# Patient Record
Sex: FEMALE | Race: WHITE | NOT HISPANIC OR LATINO | Employment: UNEMPLOYED | URBAN - METROPOLITAN AREA
[De-identification: names, ages, dates, MRNs, and addresses within clinical notes are randomized per-mention and may not be internally consistent; named-entity substitution may affect disease eponyms.]

---
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
## 2021-03-05 ENCOUNTER — HOSPITAL ENCOUNTER (EMERGENCY)
Facility: HOSPITAL | Age: 21
Discharge: HOME | End: 2021-03-05
Admitting: EMERGENCY MEDICINE

## 2021-03-05 ENCOUNTER — APPOINTMENT (OUTPATIENT)
Dept: GENERAL RADIOLOGY | Facility: HOSPITAL | Age: 21
End: 2021-03-05

## 2021-03-05 VITALS
HEART RATE: 82 BPM | BODY MASS INDEX: 51.73 KG/M2 | SYSTOLIC BLOOD PRESSURE: 130 MMHG | HEIGHT: 62 IN | WEIGHT: 281.09 LBS | RESPIRATION RATE: 14 BRPM | OXYGEN SATURATION: 99 % | TEMPERATURE: 97.2 F | DIASTOLIC BLOOD PRESSURE: 81 MMHG

## 2021-03-05 DIAGNOSIS — S93.402A SPRAIN AND STRAIN OF LEFT ANKLE: ICD-10-CM

## 2021-03-05 DIAGNOSIS — S93.402A SPRAIN OF LEFT ANKLE, UNSPECIFIED LIGAMENT, INITIAL ENCOUNTER: ICD-10-CM

## 2021-03-05 DIAGNOSIS — S80.02XA CONTUSION OF LEFT KNEE, INITIAL ENCOUNTER: ICD-10-CM
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
## 2021-03-05 DIAGNOSIS — S96.912A SPRAIN AND STRAIN OF LEFT ANKLE: ICD-10-CM

## 2021-03-05 DIAGNOSIS — M79.672 LEFT FOOT PAIN: ICD-10-CM

## 2021-03-05 PROCEDURE — 73610 X-RAY EXAM OF ANKLE: CPT

## 2021-03-05 PROCEDURE — 99283 EMERGENCY DEPT VISIT LOW MDM: CPT

## 2021-03-05 PROCEDURE — 73630 X-RAY EXAM OF FOOT: CPT

## 2021-03-05 PROCEDURE — 73562 X-RAY EXAM OF KNEE 3: CPT

## 2021-03-06 NOTE — DISCHARGE INSTRUCTIONS
Use crutches for no weightbearing  Continue Ace wrap and Aircast for ankle support  Please follow-up with occupational medicine, call Monday for an appointment, no weightbearing until cleared  Use Tylenol and or ibuprofen over-the-counter for pain  Apply ice to 4 times a day, 15 to 20 minutes at a time, do not use while sleeping or for extended periods of time

## 2021-03-06 NOTE — ED PROVIDER NOTES
Subjective   Patient is a 20-year-old female, presents emergency department complaint of left lower extremity pain after a fall at work, she reports this mechanical fall.  She denies hitting her head.  No LOC..  No neck pain.  She complains of pain in her foot ankle and knee.          Review of Systems   Constitutional: Negative for chills and fever.   Respiratory: Negative for chest tightness.    Cardiovascular: Negative for chest pain.   Musculoskeletal: Positive for arthralgias (Left foot, ankle and knee pain). Negative for back pain and neck pain.   Skin: Negative for color change, rash and wound.   Neurological: Negative for headaches.       No past medical history on file.    No Known Allergies    No past surgical history on file.    No family history on file.    Social History     Socioeconomic History   • Marital status: Single     Spouse name: Not on file   • Number of children: Not on file   • Years of education: Not on file   • Highest education level: Not on file           Objective   Physical Exam  Vitals signs and nursing note reviewed.   Constitutional:       General: She is not in acute distress.     Appearance: Normal appearance. She is not ill-appearing, toxic-appearing or diaphoretic.   HENT:      Head: Normocephalic and atraumatic.      Nose: Nose normal.      Mouth/Throat:      Mouth: Mucous membranes are moist.      Pharynx: Oropharynx is clear.   Eyes:      Extraocular Movements: Extraocular movements intact.      Conjunctiva/sclera: Conjunctivae normal.      Pupils: Pupils are equal, round, and reactive to light.   Neck:      Musculoskeletal: Normal range of motion and neck supple.   Cardiovascular:      Rate and Rhythm: Normal rate and regular rhythm.      Heart sounds: Normal heart sounds.   Pulmonary:      Breath sounds: Normal breath sounds.   Abdominal:      General: Bowel sounds are normal.      Palpations: Abdomen is soft.   Musculoskeletal:      Left knee: She exhibits swelling. She  "exhibits normal range of motion, no effusion, no ecchymosis, no deformity, no laceration, no erythema, normal alignment, normal patellar mobility and no bony tenderness. Tenderness (diffuse) found.      Left ankle: She exhibits swelling. She exhibits normal range of motion, no ecchymosis, no deformity, no laceration and normal pulse. Tenderness (Medial lateral tenderness.  No point tenderness noted.).      Left foot: Normal range of motion and normal capillary refill. Tenderness (Proximal anterior foot) present. No bony tenderness, swelling, crepitus or deformity.      Comments: Superficial abrasion noted. No active bleeding.   Bilateral pedal pulses intact.  Cap refill brisk   Skin:     General: Skin is warm and dry.      Capillary Refill: Capillary refill takes less than 2 seconds.   Neurological:      Mental Status: She is alert and oriented to person, place, and time.   Psychiatric:         Mood and Affect: Mood normal.         Behavior: Behavior normal.         Procedures           ED Course  Appropriate PPE was worn during the duration of the care for this patient while in the emergency department per [facility] guidelines      /81 (BP Location: Right arm, Patient Position: Lying)   Pulse 82   Temp 97.2 °F (36.2 °C) (Oral)   Resp 14   Ht 157.5 cm (62\")   Wt 127 kg (281 lb 1.4 oz)   LMP 02/26/2021   SpO2 99%   BMI 51.41 kg/m²   Labs Reviewed - No data to display  Medications - No data to display  Xr Knee 3 View Left    Result Date: 3/5/2021  1. Normal exam.  Electronically Signed By-Lolly Austin MD On:3/5/2021 8:24 PM This report was finalized on [id number] by  Lolly Austin MD.    Xr Ankle 3+ View Left    Result Date: 3/5/2021  1. Soft tissue swelling. Otherwise negative.  Electronically Signed By-Lolly Austin MD On:3/5/2021 8:25 PM This report was finalized on [id number] by  Lolly Austin MD.    Xr Foot 3+ View Left    Result Date: 3/5/2021  1. Normal exam.  Electronically " Signed By-Lolly Austin MD On:3/5/2021 8:25 PM This report was finalized on [id number] by  Lolly Austin MD.                                         MDM  Number of Diagnoses or Management Options  Contusion of left knee, initial encounter:   Fall, initial encounter:   Left foot pain:   Sprain and strain of left ankle:   Sprain of left ankle, unspecified ligament, initial encounter:   Diagnosis management comments: Differentials: Contusion, fracture, sprain  This list is not all inclusive and does not constitute the entireity of considered causes.     Labs reviewed by me and significant for the following: Not warranted    Imaging, Interpreted per radiologist, independently viewed by myself: Xr Knee 3 View Left    Result Date: 3/5/2021  1. Normal exam.  Electronically Signed By-Lolly Austin MD On:3/5/2021 8:24 PM This report was finalized on [id number] by  Lolly Austin MD.    Xr Ankle 3+ View Left    Result Date: 3/5/2021  1. Soft tissue swelling. Otherwise negative.  Electronically Signed By-Lolly Austin MD On:3/5/2021 8:25 PM This report was finalized on [id number] by  Lolly Austin MD.    Xr Foot 3+ View Left    Result Date: 3/5/2021  1. Normal exam.  Electronically Signed By-Lolly Austin MD On:3/5/2021 8:25 PM This report was finalized on [id number] by  Lolly Austin MD.        Patient was brought back to the emergency department room for evaluation and  placed on appropriate monitoring.  Vital signs have  been reviewed. Patient is afebrile.  She noted to be of exam and work-up.  Her x-ray imaging here in the ED is negative, she will placed in Aircast and Ace wrap, given crutches.  Advised her to follow-up with occupational medicine, to be clearance for weightbearing.    Plan and Disposition: I spoke with the patient at the bedside regarding their plan of care, discharge instruction, home care, prescriptions, and importance follow-up.  We discussed test results at the bedside.  Patient was made  aware of indications to return to the emergency department.  Patient agrees with the current plan of care for discharge, verbalized understanding of all instructions    Pt is aware that discharge does not mean that nothing is wrong but it indicates no emergency is present and they must continue care with follow-up as given below or physician of their choice           Amount and/or Complexity of Data Reviewed  Tests in the radiology section of CPT®: reviewed  Independent visualization of images, tracings, or specimens: yes    Patient Progress  Patient progress: stable      Final diagnoses:   Fall, initial encounter   Left foot pain   Sprain of left ankle, unspecified ligament, initial encounter   Contusion of left knee, initial encounter   Sprain and strain of left ankle            Alisha Chen, APRN  03/05/21 3509

## 2021-03-24 ENCOUNTER — TRANSCRIBE ORDERS (OUTPATIENT)
Dept: PHYSICAL THERAPY | Facility: CLINIC | Age: 21
End: 2021-03-24

## 2021-03-24 DIAGNOSIS — M25.562 LEFT KNEE PAIN, UNSPECIFIED CHRONICITY: ICD-10-CM

## 2021-03-24 DIAGNOSIS — S93.402A SPRAIN OF LEFT ANKLE, UNSPECIFIED LIGAMENT, INITIAL ENCOUNTER: Primary | ICD-10-CM

## 2021-04-06 ENCOUNTER — TREATMENT (OUTPATIENT)
Dept: PHYSICAL THERAPY | Facility: CLINIC | Age: 21
End: 2021-04-06

## 2021-04-06 DIAGNOSIS — S93.402A SPRAIN OF LEFT ANKLE, UNSPECIFIED LIGAMENT, INITIAL ENCOUNTER: Primary | ICD-10-CM

## 2021-04-06 DIAGNOSIS — M25.562 LEFT KNEE PAIN, UNSPECIFIED CHRONICITY: ICD-10-CM

## 2021-04-06 PROCEDURE — 97110 THERAPEUTIC EXERCISES: CPT | Performed by: PHYSICAL THERAPIST

## 2021-04-06 PROCEDURE — 97162 PT EVAL MOD COMPLEX 30 MIN: CPT | Performed by: PHYSICAL THERAPIST

## 2021-04-06 NOTE — PROGRESS NOTES
Physical Therapy Initial Evaluation and Plan of Care      Patient: Xena Caicedo   : 2000  Diagnosis/ICD-10 Code:  Sprain of left ankle, unspecified ligament, initial encounter [S93.402A]  Referring practitioner: TALA Houston  Date of Initial Visit: 2021  Today's Date: 2021  Patient seen for 1 sessions           Subjective Questionnaire: FAAM:  46/84;  Knee outcome survey:  50%      Subjective Evaluation    History of Present Illness  Mechanism of injury: Patient presents to physical therapy with cc of left foot/ankle pain and left knee pain.  Reports that she fell at work and he ankle bent under her (demonstrates eversion ankle sprain mechanism).  Patient currently in walking boot, was on crutches for four weeks post injury which were discontinued on 3/24/21.  Patient reports pain in left knee is in front of knee.      Patient reports that she has follow-up with MD tomorrow.  Patient reports that she is a  and is on her feet full-time.  Patient has been off work since injury.  Wishes to have less pain in left ankle/knee so that she can complete work activities without pain or limitation.     Pain  Current pain ratin  Location: knee and ankle  Quality: sharp  Relieving factors: ice  Aggravating factors: ambulation, lifting, squatting, movement, standing and stairs  Progression: improved    Diagnostic Tests  X-ray: abnormal    Patient Goals  Patient goals for therapy: decreased pain, increased motion, increased strength and return to work             Objective          Tenderness   Left Knee   Tenderness in the medial joint line.   Left Ankle/Foot   Tenderness in the deltoid ligament, fibula, medial malleolus, navicular and posterior tibial tendon. No tenderness in the Achilles insertion, anterior talofibular ligament and fifth metatarsal base.     Right Ankle/Foot   No tenderness.     Active Range of Motion   Left Knee   Normal active range of motion    Right Knee   Normal active  range of motion  Left Ankle/Foot   Dorsiflexion (ke): 3 degrees   Plantar flexion: 45 degrees   Inversion: 30 degrees   Eversion: 20 degrees     Right Ankle/Foot   Dorsiflexion (ke): 5 degrees   Plantar flexion: 45 degrees   Inversion: 30 degrees   Eversion: 20 degrees     Strength/Myotome Testing     Left Knee   Normal strength    Right Knee   Normal strength    Left Ankle/Foot   Dorsiflexion: 4+  Plantar flexion: 4+  Inversion: 4  Eversion: 4    Right Ankle/Foot   Dorsiflexion: 5  Plantar flexion: 5  Inversion: 5  Eversion: 5    Swelling   Left Ankle/Foot   Figure 8: 58.5 cm    Right Ankle/Foot   Figure 8: 55.5 cm          Assessment & Plan     Assessment  Impairments: abnormal gait, abnormal or restricted ROM, impaired physical strength, lacks appropriate home exercise program and pain with function  Assessment details: Patient presents to physical therapy with s/s of eversion ankle sprain and left knee pain.  Noted weakness in left ankle with resisted testing, ROM deficits and increase in edema in left ankle.  Patient currently ambulating with antalgic gait pattern in walking boot.  Patient is appropriate for physical therapy intervention in order to address these deficits so that she may return to work without pain or limitation.   Prognosis: good  Functional Limitations: walking and uncomfortable because of pain  Goals  Plan Goals: In three weeks, patient will demonstrate at least 25% reduction in pain level in left foot/ankle.   In three weeks, patient will demonstrate at least 4+/5 muscular strength in left foot/ankle.     In six weeks, patient will demonstrate at least 50% improvement in gait mechanics.   In six weeks, patient will demonstrate at least 10 seconds SLS on LLE.   In six weeks, patient will demonstrate decreased perceived disability by decreasing score on FAAM by at least 10 points.     Plan  Therapy options: will be seen for skilled physical therapy services  Planned modality interventions:  cryotherapy, TENS and thermotherapy (hydrocollator packs)  Planned therapy interventions: manual therapy, neuromuscular re-education, soft tissue mobilization, strengthening, stretching, therapeutic activities, joint mobilization, home exercise program, functional ROM exercises and gait training  Frequency: 2x week  Duration in visits: 20        Manual Therapy:         mins  60782;  Therapeutic Exercise:    15     mins  23095;     Neuromuscular Padma:        mins  79976;    Therapeutic Activity:          mins  02742;     Gait Training:           mins  33271;     Ultrasound:          mins  46099;    Electrical Stimulation:         mins  74777 ( );  Dry Needling          mins self-pay    Timed Treatment:   15   mins   Total Treatment:     45   mins    PT SIGNATURE: Romel Mayes, PRATIBHA   DATE TREATMENT INITIATED: 4/6/2021    Initial Certification  Certification Period: 7/5/2021  I certify that the therapy services are furnished while this patient is under my care.  The services outlined above are required by this patient, and will be reviewed every 90 days.     PHYSICIAN: Bobo Chu PA      DATE:     Please sign and return via fax to [phone].. Thank you, [facility].

## 2021-04-09 ENCOUNTER — TREATMENT (OUTPATIENT)
Dept: PHYSICAL THERAPY | Facility: CLINIC | Age: 21
End: 2021-04-09

## 2021-04-09 DIAGNOSIS — M25.562 LEFT KNEE PAIN, UNSPECIFIED CHRONICITY: ICD-10-CM

## 2021-04-09 DIAGNOSIS — S93.402A SPRAIN OF LEFT ANKLE, UNSPECIFIED LIGAMENT, INITIAL ENCOUNTER: Primary | ICD-10-CM

## 2021-04-09 PROCEDURE — 97140 MANUAL THERAPY 1/> REGIONS: CPT | Performed by: PHYSICAL THERAPIST

## 2021-04-09 PROCEDURE — 97110 THERAPEUTIC EXERCISES: CPT | Performed by: PHYSICAL THERAPIST

## 2021-04-09 PROCEDURE — 97112 NEUROMUSCULAR REEDUCATION: CPT | Performed by: PHYSICAL THERAPIST

## 2021-04-09 NOTE — PROGRESS NOTES
Physical Therapy Daily Progress Note    Patient: Xena Caicedo   : 2000  Diagnosis/ICD-10 Code:  Sprain of left ankle, unspecified ligament, initial encounter [S93.402A]  Referring practitioner: TALA Houston  Date of Initial Visit: Type: THERAPY  Noted: 2021  Today's Date: 2021  Patient seen for 2 sessions         Xena Caicedo reports:  Left ankle still sore at times.  Patient reports that MD cleared her to wean out of walking boot, however she has still been wearing boot due to fear of increasing pain.     Objective   See Exercise, Manual, and Modality Logs for complete treatment.       Assessment/Plan     Patient educated on proper progression for weaning from walking boot.  Patient tolerates exercise progression well.     Progress per Plan of Care           Manual Therapy:    10     mins  50053;  Therapeutic Exercise:    15     mins  67327;     Neuromuscular Padma:  15      mins  44905;    Therapeutic Activity:          mins  89271;     Gait Training:           mins  72194;     Ultrasound:          mins  65909;    Electrical Stimulation:         mins  54092 ( );  Dry Needling          mins self-pay    Timed Treatment:   40   mins   Total Treatment:     40   mins    Romel Mayes PT  Physical Therapist

## 2021-04-13 ENCOUNTER — TREATMENT (OUTPATIENT)
Dept: PHYSICAL THERAPY | Facility: CLINIC | Age: 21
End: 2021-04-13

## 2021-04-13 DIAGNOSIS — S93.402A SPRAIN OF LEFT ANKLE, UNSPECIFIED LIGAMENT, INITIAL ENCOUNTER: Primary | ICD-10-CM

## 2021-04-13 PROCEDURE — 97116 GAIT TRAINING THERAPY: CPT | Performed by: PHYSICAL THERAPIST

## 2021-04-13 PROCEDURE — 97140 MANUAL THERAPY 1/> REGIONS: CPT | Performed by: PHYSICAL THERAPIST

## 2021-04-13 PROCEDURE — 97110 THERAPEUTIC EXERCISES: CPT | Performed by: PHYSICAL THERAPIST

## 2021-04-14 NOTE — PROGRESS NOTES
Physical Therapy Daily Progress Note      Patient: Xena Caicedo   : 2000  Diagnosis/ICD-10 Code:  Sprain of left ankle, unspecified ligament, initial encounter [S93.402A]  Referring practitioner: TALA Houston  Date of Initial Visit: Type: THERAPY  Noted: 2021  Today's Date: 2021  Patient seen for 3 sessions         Xena Caicedo reports:  Left ankle feeling better.  Reports that she is getting out of the boot more often throughout the day.     Objective   See Exercise, Manual, and Modality Logs for complete treatment.       Assessment/Plan     Noted continued edema in left medial ankle.  Strength in left ankle improve.  Patient demonstrates inability to complete full heel strike on left foot consistently while walking on treadmill.  Progressing well.    Progress per Plan of Care           Manual Therapy:    15     mins  06757;  Therapeutic Exercise:    25     mins  98598;     Neuromuscular Padma:        mins  31306;    Therapeutic Activity:          mins  41603;     Gait Training:      10     mins  63627;     Ultrasound:          mins  17196;    Electrical Stimulation:         mins  45617 ( );  Dry Needling          mins self-pay    Timed Treatment:   50   mins   Total Treatment:     50   mins    Romel Mayes PT  Physical Therapist

## 2021-04-16 ENCOUNTER — TREATMENT (OUTPATIENT)
Dept: PHYSICAL THERAPY | Facility: CLINIC | Age: 21
End: 2021-04-16

## 2021-04-16 DIAGNOSIS — S93.402A SPRAIN OF LEFT ANKLE, UNSPECIFIED LIGAMENT, INITIAL ENCOUNTER: Primary | ICD-10-CM

## 2021-04-16 PROCEDURE — 97116 GAIT TRAINING THERAPY: CPT | Performed by: PHYSICAL THERAPIST

## 2021-04-16 PROCEDURE — 97140 MANUAL THERAPY 1/> REGIONS: CPT | Performed by: PHYSICAL THERAPIST

## 2021-04-16 PROCEDURE — 97110 THERAPEUTIC EXERCISES: CPT | Performed by: PHYSICAL THERAPIST

## 2021-04-16 NOTE — PROGRESS NOTES
Physical Therapy Daily Progress Note    Patient: Xena Caicedo   : 2000  Diagnosis/ICD-10 Code:  Sprain of left ankle, unspecified ligament, initial encounter [S93.402A]  Referring practitioner: TALA Houston  Date of Initial Visit: Type: THERAPY  Noted: 2021  Today's Date: 2021  Patient seen for 4 sessions         Xena Caicedo reports: Left ankle feeling sore today, as she was walking out of boot for extended period of time today.      Objective   See Exercise, Manual, and Modality Logs for complete treatment.       Assessment/Plan     Noted increased edema in left foot and ankle.  Demonstrates continued decrease in heel strike on left foot, which is corrected briefly after verbal cueing. Fatigue noted after exercise.     Progress per Plan of Care           Manual Therapy:    15     mins  45932;  Therapeutic Exercise:    20     mins  64363;     Neuromuscular Padma:        mins  55353;    Therapeutic Activity:          mins  26880;     Gait Training:      10     mins  91374;     Ultrasound:          mins  92289;    Electrical Stimulation:         mins  82175 ( );  Dry Needling          mins self-pay    Timed Treatment:   45   mins   Total Treatment:     45   mins    Romel Mayes PT  Physical Therapist

## 2021-04-20 ENCOUNTER — TREATMENT (OUTPATIENT)
Dept: PHYSICAL THERAPY | Facility: CLINIC | Age: 21
End: 2021-04-20

## 2021-04-20 DIAGNOSIS — S93.402A SPRAIN OF LEFT ANKLE, UNSPECIFIED LIGAMENT, INITIAL ENCOUNTER: Primary | ICD-10-CM

## 2021-04-20 PROCEDURE — 97116 GAIT TRAINING THERAPY: CPT | Performed by: PHYSICAL THERAPIST

## 2021-04-20 PROCEDURE — 97140 MANUAL THERAPY 1/> REGIONS: CPT | Performed by: PHYSICAL THERAPIST

## 2021-04-20 PROCEDURE — 97110 THERAPEUTIC EXERCISES: CPT | Performed by: PHYSICAL THERAPIST

## 2021-04-20 NOTE — PROGRESS NOTES
Physical Therapy Daily Progress Note    Patient: Xena Caicedo   : 2000  Diagnosis/ICD-10 Code:  Sprain of left ankle, unspecified ligament, initial encounter [S93.402A]  Referring practitioner: TALA Houston  Date of Initial Visit: Type: THERAPY  Noted: 2021  Today's Date: 2021  Patient seen for 5 sessions         Xena Caicedo reports:  Left ankle feeling sore after walking on walking bridge yesterday.  Reports compliance with HEP.    Objective   See Exercise, Manual, and Modality Logs for complete treatment.       Assessment/Plan     Noted increased edema in left posterior ankle this visit.  Strength improving.  Patient also demonstrates improved gait mechanics on treadmill this visit.     Progress per Plan of Care           Manual Therapy:    10     mins  51148;  Therapeutic Exercise:    25     mins  19796;     Neuromuscular Padma:        mins  33745;    Therapeutic Activity:          mins  80120;     Gait Training:      10     mins  15033;     Ultrasound:          mins  62881;    Electrical Stimulation:         mins  18117 ( );  Dry Needling          mins self-pay    Timed Treatment:   45   mins   Total Treatment:     45   mins    Romel Mayes PT  Physical Therapist

## 2021-04-23 ENCOUNTER — TREATMENT (OUTPATIENT)
Dept: PHYSICAL THERAPY | Facility: CLINIC | Age: 21
End: 2021-04-23

## 2021-04-23 DIAGNOSIS — S93.402A SPRAIN OF LEFT ANKLE, UNSPECIFIED LIGAMENT, INITIAL ENCOUNTER: Primary | ICD-10-CM

## 2021-04-23 PROCEDURE — 97140 MANUAL THERAPY 1/> REGIONS: CPT | Performed by: PHYSICAL THERAPIST

## 2021-04-23 PROCEDURE — 97110 THERAPEUTIC EXERCISES: CPT | Performed by: PHYSICAL THERAPIST

## 2021-04-23 PROCEDURE — 97112 NEUROMUSCULAR REEDUCATION: CPT | Performed by: PHYSICAL THERAPIST

## 2021-04-23 NOTE — PROGRESS NOTES
Physical Therapy Daily Progress Note    Patient: Xena Caicedo   : 2000  Diagnosis/ICD-10 Code:  Sprain of left ankle, unspecified ligament, initial encounter [S93.402A]  Referring practitioner: TALA Houston  Date of Initial Visit: Type: THERAPY  Noted: 2021  Today's Date: 2021  Patient seen for 6 sessions         Xena Caicedo reports:  Left ankle feeling a little better.  Still sore with walking.        Objective   See Exercise, Manual, and Modality Logs for complete treatment.       Assessment/Plan    Progressing well with exercise.  Left ankle PROM limited with DF this visit.  Noted continued gait abnormalities (foot flat).     Progress per Plan of Care           Manual Therapy:    15     mins  10616;  Therapeutic Exercise:    20     mins  25358;     Neuromuscular Padma:    10    mins  87390;    Therapeutic Activity:          mins  87922;     Gait Training:           mins  13253;     Ultrasound:         mins  20551;    Electrical Stimulation:         mins  24153 ( );  Dry Needling          mins self-pay    Timed Treatment:   45   mins   Total Treatment:     45   mins    Romel Mayes PT  Physical Therapist

## 2021-04-27 ENCOUNTER — TELEPHONE (OUTPATIENT)
Dept: PHYSICAL THERAPY | Facility: CLINIC | Age: 21
End: 2021-04-27

## 2021-04-27 NOTE — TELEPHONE ENCOUNTER
COULD NOT GET THREW ON PHONE, PATIENT SAW DOCTOR AND HE WANTS HER SEEN 3X A WEEK FOR 2 WEEKS AND SEES HIM ON MAY 10TH, PLEASE CALL AND SCHEDULE HER ADDITIONAL APOINTMENTS

## 2021-05-04 ENCOUNTER — TREATMENT (OUTPATIENT)
Dept: PHYSICAL THERAPY | Facility: CLINIC | Age: 21
End: 2021-05-04

## 2021-05-04 DIAGNOSIS — S93.402A SPRAIN OF LEFT ANKLE, UNSPECIFIED LIGAMENT, INITIAL ENCOUNTER: Primary | ICD-10-CM

## 2021-05-04 PROCEDURE — 97110 THERAPEUTIC EXERCISES: CPT | Performed by: PHYSICAL THERAPIST

## 2021-05-04 PROCEDURE — 97140 MANUAL THERAPY 1/> REGIONS: CPT | Performed by: PHYSICAL THERAPIST

## 2021-05-04 PROCEDURE — 97112 NEUROMUSCULAR REEDUCATION: CPT | Performed by: PHYSICAL THERAPIST

## 2021-05-04 NOTE — PROGRESS NOTES
Physical Therapy Daily Progress Note    Patient: Xena Caicedo   : 2000  Diagnosis/ICD-10 Code:  Sprain of left ankle, unspecified ligament, initial encounter [S93.402A]  Referring practitioner: TALA Houston  Date of Initial Visit: Type: THERAPY  Noted: 2021  Today's Date: 2021  Patient seen for 7 sessions         Xena Caicedo reports:  Left ankle still feeling sore when she is up on it for prolonged periods of time.  Has follow-up with MD on 5/10/21.    Objective   See Exercise, Manual, and Modality Logs for complete treatment.       Assessment/Plan     Tolerates dynamic stabilization exercise well this visit.  Patient requires several verbal cues for maintaining concentration during the exercises, as she frequently switches from left foot to right foot during SLS exercise.  Patient also demonstrates decreased heel strike on left while walking on treadmill.      Progress per Plan of Care           Manual Therapy:    10     mins  32312;  Therapeutic Exercise:    15     mins  73308;     Neuromuscular Padma:    15    mins  64280;    Therapeutic Activity:          mins  59828;     Gait Training:           mins  69698;     Ultrasound:          mins  49619;    Electrical Stimulation:         mins  35667 ( );  Dry Needling          mins self-pay    Timed Treatment:   40   mins   Total Treatment:     40   mins    Romel Mayes PT  Physical Therapist

## 2021-05-06 ENCOUNTER — TREATMENT (OUTPATIENT)
Dept: PHYSICAL THERAPY | Facility: CLINIC | Age: 21
End: 2021-05-06

## 2021-05-06 DIAGNOSIS — S93.402A SPRAIN OF LEFT ANKLE, UNSPECIFIED LIGAMENT, INITIAL ENCOUNTER: Primary | ICD-10-CM

## 2021-05-06 PROCEDURE — 97110 THERAPEUTIC EXERCISES: CPT | Performed by: PHYSICAL THERAPIST

## 2021-05-06 PROCEDURE — 97140 MANUAL THERAPY 1/> REGIONS: CPT | Performed by: PHYSICAL THERAPIST

## 2021-05-06 PROCEDURE — 97112 NEUROMUSCULAR REEDUCATION: CPT | Performed by: PHYSICAL THERAPIST

## 2021-05-06 NOTE — PROGRESS NOTES
Physical Therapy Daily Progress Note      Patient: Xena Caicedo   : 2000  Diagnosis/ICD-10 Code:  Sprain of left ankle, unspecified ligament, initial encounter [S93.402A]  Referring practitioner: TALA Houston  Date of Initial Visit: Type: THERAPY  Noted: 2021  Today's Date: 2021  Patient seen for 8 sessions             Subjective Pt says her ankle is swollen this afternoon because she was on her feet a lot yesterday    Objective   See Exercise, Manual, and Modality Logs for complete treatment.       Assessment/Plan  Pt had some swelling in ankle.  Noted balance deficits and gait deficits.  Tolerated treatment well overall today.    Progress per Plan of Care           Timed:         Manual Therapy:    10     mins  83634;     Therapeutic Exercise:    15     mins  03941;     Neuromuscular Padma:    15    mins  82592;        Timed Treatment:   40   mins   Total Treatment:     50   mins    Colt Faith PTA  Physical Therapist Assistant

## 2021-05-11 ENCOUNTER — TELEPHONE (OUTPATIENT)
Dept: PHYSICAL THERAPY | Facility: CLINIC | Age: 21
End: 2021-05-11

## 2021-05-19 ENCOUNTER — OFFICE VISIT (OUTPATIENT)
Dept: PODIATRY | Facility: CLINIC | Age: 21
End: 2021-05-19

## 2021-05-19 ENCOUNTER — TREATMENT (OUTPATIENT)
Dept: PHYSICAL THERAPY | Facility: CLINIC | Age: 21
End: 2021-05-19

## 2021-05-19 VITALS
BODY MASS INDEX: 52.81 KG/M2 | WEIGHT: 287 LBS | HEART RATE: 81 BPM | HEIGHT: 62 IN | DIASTOLIC BLOOD PRESSURE: 71 MMHG | SYSTOLIC BLOOD PRESSURE: 103 MMHG

## 2021-05-19 DIAGNOSIS — M25.372 ANKLE INSTABILITY, LEFT: ICD-10-CM

## 2021-05-19 DIAGNOSIS — S93.402A SPRAIN OF LEFT ANKLE, UNSPECIFIED LIGAMENT, INITIAL ENCOUNTER: Primary | ICD-10-CM

## 2021-05-19 DIAGNOSIS — M25.572 ACUTE LEFT ANKLE PAIN: Primary | ICD-10-CM

## 2021-05-19 DIAGNOSIS — E66.01 MORBID OBESITY WITH BMI OF 50.0-59.9, ADULT (HCC): ICD-10-CM

## 2021-05-19 DIAGNOSIS — S93.402S MODERATE LEFT ANKLE SPRAIN, SEQUELA: ICD-10-CM

## 2021-05-19 DIAGNOSIS — M76.822 POSTERIOR TIBIAL TENDINITIS OF LEFT LEG: ICD-10-CM

## 2021-05-19 DIAGNOSIS — M25.562 LEFT KNEE PAIN, UNSPECIFIED CHRONICITY: ICD-10-CM

## 2021-05-19 PROCEDURE — 99203 OFFICE O/P NEW LOW 30 MIN: CPT | Performed by: PODIATRIST

## 2021-05-19 PROCEDURE — 97530 THERAPEUTIC ACTIVITIES: CPT | Performed by: PHYSICAL THERAPIST

## 2021-05-19 PROCEDURE — 97110 THERAPEUTIC EXERCISES: CPT | Performed by: PHYSICAL THERAPIST

## 2021-05-19 RX ORDER — CYCLOBENZAPRINE HCL 10 MG
10 TABLET ORAL 3 TIMES DAILY PRN
COMMUNITY
End: 2022-05-11

## 2021-05-19 NOTE — PROGRESS NOTES
Physical Therapy Daily Progress Note    VISIT#: 9    Subjective   Xena Caicedo reports that she saw the doctor earlier today and was instructed that if she is not better in 4 weeks she will have a MRI performed and possibly surgery. Pt reports she has been given new orthotics and a new brace  Pain Rating (0/10): 6    Objective     See Exercise, Manual, and Modality Logs for complete treatment.     Assessment/Plan   Manual therapy continued today with gradual progression of exercises per pt tolerance with decreased treatment time today due to pt arriving late to the session.     Plan  Progress per Plan of Care and Progress strengthening /stabilization /functional activity            Timed:         Manual Therapy:    10     mins  39980;     Therapeutic Exercise:         mins  03085;     Neuromuscular Padma:        mins  39978;    Therapeutic Activity:     20     mins  47264;     Gait Training:           mins  50872;     Ultrasound:          mins  09339;    Ionto                                   mins   49386  Self Care                            mins   66762    Un-Timed:  Electrical Stimulation:         mins  79804 ( );  Dry Needling          mins self-pay  Traction          mins 17512  Low Eval          Mins  83236  Mod Eval          Mins  81490  High Eval                            Mins  69565  Re-Eval                               mins  39855    Timed Treatment:   30   mins   Total Treatment:     30   mins    Simi Valdivia PT, DPT  Physical Therapist

## 2021-05-20 NOTE — PROGRESS NOTES
05/19/2021  Foot and Ankle Surgery - New Patient   Provider: Dr. Jewel Walker DPM  Location: [facility]    Subjective:  Xena Caicedo is a 20 y.o. female.     Chief Complaint   Patient presents with   • Left Ankle - Pain     Lost balance twisted ankle and landed on left side march 3, 2021 at work       HPI: Patient is a 20-year-old female that presents approximately 2 months after injury involving her left ankle.  She describes inversion type injury after she lost balance at work.  She did attempt conservative care but continues to have pain and limitation involving the medial lateral aspects of the ankle.  Symptoms are noticed with increased activity and typically improved with rest.  She did not have any prominent discomfort involving the foot or ankle prior to the incident.  She rates the pain a 7 out of 10 when present.  She is concerned that the symptoms will progress and cause further limitation.  She denies any other issues today.    No Known Allergies    History reviewed. No pertinent past medical history.    History reviewed. No pertinent surgical history.    Family History   Problem Relation Age of Onset   • Heart attack Mother        Social History     Socioeconomic History   • Marital status: Single     Spouse name: Not on file   • Number of children: Not on file   • Years of education: Not on file   • Highest education level: Not on file   Tobacco Use   • Smoking status: Never Smoker   • Smokeless tobacco: Never Used   Vaping Use   • Vaping Use: Some days   Substance and Sexual Activity   • Alcohol use: Never   • Drug use: Never   • Sexual activity: Defer        Current Outpatient Medications on File Prior to Visit   Medication Sig Dispense Refill   • cyclobenzaprine (FLEXERIL) 10 MG tablet Take 10 mg by mouth 3 (Three) Times a Day As Needed for Muscle Spasms.     • diclofenac (VOLTAREN) 50 MG EC tablet Take 50 mg by mouth 2 (Two) Times a Day As Needed.       No current  "facility-administered medications on file prior to visit.       Review of Systems:  General: Denies fever, chills, fatigue, and weakness.  Eyes: Denies vision loss, blurry vision, and excessive redness.  ENT: Denies hearing issues and difficulty swallowing.  Cardiovascular: Denies palpitations, chest pain, or syncopal episodes.  Respiratory: Denies shortness of breath, wheezing, and coughing.  GI: Denies abdominal pain, nausea, and vomiting.   : Denies frequency, hematuria, and urgency.  Musculoskeletal: + Left foot and ankle pain  Derm: Denies rash, open wounds, or suspicious lesions.  Neuro: Denies headaches, numbness, loss of coordination, and tremors.  Psych: Denies anxiety and depression.  Endocrine: Denies temperature intolerance and changes in appetite.  Heme: Denies bleeding disorders or abnormal bruising.     Objective   /71   Pulse 81   Ht 157.5 cm (62\")   Wt 130 kg (287 lb)   LMP 05/03/2021 (Exact Date)   BMI 52.49 kg/m²     Foot/Ankle Exam:       General:   Appearance: appears stated age and healthy and obesity    Orientation: AAOx3    Affect: appropriate    Gait: antalgic      VASCULAR      Left Foot Vascularity   Normal vascular exam    Dorsalis pedis:  2+  Posterior tibial:  2+  Skin Temperature: warm    Edema Grading:  None  CFT:  < 3 seconds  Pedal Hair Growth:  Present  Varicosities: none        NEUROLOGIC     Left Foot Neurologic   Light touch sensation:  Normal  Hot/cold sensation: normal    Achilles reflex:  2+     MUSCULOSKELETAL      Left Foot Musculoskeletal   Ecchymosis:  None  Tenderness: lateral malleolus, subtalar joint and posterior tibial tendon    Arch:  Pes planus     MUSCLE STRENGTH     Left Foot Muscle Strength   Normal strength    Foot dorsiflexion:  5  Foot plantar flexion:  5  Foot inversion:  5  Foot eversion:  5     DERMATOLOGIC     Left Foot Dermatologic   Skin: skin intact       TESTS     Left Foot Tests   Anterior drawer: positive    Varus tilt: negative    Heel " raise: pain        Left Foot Additional Comments: No gross deformity is present.  Mild instability noted with anterior drawer testing as compared to the contralateral extremity.  Discomfort along the insertion site of the posterior tibial tendon.  Mild equinus contracture.      Assessment/Plan   Diagnoses and all orders for this visit:    1. Acute left ankle pain (Primary)    2. Moderate left ankle sprain, sequela    3. Ankle instability, left    4. Posterior tibial tendinitis of left leg    5. Morbid obesity with BMI of 50.0-59.9, adult (CMS/[facility])      Patient presents approximately 2 months after injury to her left ankle.  She complains of pain involving the medial and lateral aspects.  On exam, she does have mild instability.  She also has pain with palpation involving the posterior tibial tendon insertion.  Previous imaging was also reviewed showing no obvious fractures or dislocations.  We did review the diagnoses and treatment options at length.  I have recommended that we proceed with increased support and function at this time.  I have dispensed a lace up ankle brace and spent greater than 15 minutes reviewing the proper use and effects.  I would also like her to obtain a pair of over-the-counter arch supports which should improve her symptoms and prevent some of the medial column discomfort.  We did review the possibility of formal physical therapy which she does not want to consider at this time.  I did review proper stretching and manual therapy exercises.  We did review rice therapy and proper use of OTC anti-inflammatories if necessary.  I would like to see her in 4 weeks for reevaluation.    No orders of the defined types were placed in this encounter.       Note is dictated utilizing voice recognition software. Unfortunately this leads to occasional typographical errors. I apologize in advance if the situation occurs. If questions occur please do not hesitate to call our office.

## 2021-05-20 NOTE — PATIENT INSTRUCTIONS
Ankle Sprain    An ankle sprain is a stretch or tear in a ligament in the ankle. Ligaments are tissues that connect bones to each other.  The two most common types of ankle sprains are:  · Inversion sprain. This happens when the foot turns inward and the ankle rolls outward. It affects the ligament on the outside of the foot (lateral ligament).  · Eversion sprain. This happens when the foot turns outward and the ankle rolls inward. It affects the ligament on the inner side of the foot (medial ligament).  What are the causes?  This condition is often caused by accidentally rolling or twisting the ankle.  What increases the risk?  You are more likely to develop this condition if you play sports.  What are the signs or symptoms?  Symptoms of this condition include:  · Pain in your ankle.  · Swelling.  · Bruising. This may develop right after you sprain your ankle or 1-2 days later.  · Trouble standing or walking, especially when you turn or change directions.  How is this diagnosed?  This condition is diagnosed with:  · A physical exam. During the exam, your health care provider will press on certain parts of your foot and ankle and try to move them in certain ways.  · X-ray imaging. These may be taken to see how severe the sprain is and to check for broken bones.  How is this treated?  This condition may be treated with:  · A brace or splint. This is used to keep the ankle from moving until it heals.  · An elastic bandage. This is used to support the ankle.  · Crutches.  · Pain medicine.  · Surgery. This may be needed if the sprain is severe.  · Physical therapy. This may help to improve the range of motion in the ankle.  Follow these instructions at home:  If you have a brace or a splint:  · Wear the brace or splint as told by your health care provider. Remove it only as told by your health care provider.  · Loosen the brace or splint if your toes tingle, become numb, or turn cold and blue.  · Keep the brace or  splint clean.  · If the brace or splint is not waterproof:  ? Do not let it get wet.  ? Cover it with a watertight covering when you take a bath or a shower.  If you have an elastic bandage (dressing):  · Remove it to shower or bathe.  · Try not to move your ankle much, but wiggle your toes from time to time. This helps to prevent swelling.  · Adjust the dressing to make it more comfortable if it feels too tight.  · Loosen the dressing if you have numbness or tingling in your foot, or if your foot becomes cold and blue.  Managing pain, stiffness, and swelling    · Take over-the-counter and prescription medicines only as told by your health care provider.  · For 2-3 days, keep your ankle raised (elevated) above the level of your heart as much as possible.  · If directed, put ice on the injured area:  ? If you have a removable brace or splint, remove it as told by your health care provider.  ? Put ice in a plastic bag.  ? Place a towel between your skin and the bag.  ? Leave the ice on for 20 minutes, 2-3 times a day.  General instructions  · Rest your ankle.  · Do not use the injured limb to support your body weight until your health care provider says that you can. Use crutches as told by your health care provider.  · Do not use any products that contain nicotine or tobacco, such as cigarettes, e-cigarettes, and chewing tobacco. If you need help quitting, ask your health care provider.  · Keep all follow-up visits as told by your health care provider. This is important.  Contact a health care provider if:  · You have rapidly increasing bruising or swelling.  · Your pain is not relieved with medicine.  Get help right away if:  · Your foot or toes become numb or blue.  · You have severe pain that gets worse.  Summary  · An ankle sprain is a stretch or tear in a ligament in the ankle. Ligaments are tissues that connect bones to each other.  · This condition is often caused by accidentally rolling or twisting the  ankle.  · Symptoms include pain, swelling, bruising, and trouble walking.  · To relieve pain and swelling, put ice on the affected ankle, raise your ankle above the level of your heart, and use an elastic bandage.  · Keep all follow-up visits as told by your health care provider. This is important.  This information is not intended to replace advice given to you by your health care provider. Make sure you discuss any questions you have with your health care provider.  Document Revised: 09/09/2019 Document Reviewed: 05/14/2019  [employer] Patient Education © 2021 [employer]

## 2021-06-07 ENCOUNTER — TREATMENT (OUTPATIENT)
Dept: PHYSICAL THERAPY | Facility: CLINIC | Age: 21
End: 2021-06-07

## 2021-06-07 DIAGNOSIS — S93.402A SPRAIN OF LEFT ANKLE, UNSPECIFIED LIGAMENT, INITIAL ENCOUNTER: Primary | ICD-10-CM

## 2021-06-07 PROCEDURE — 97112 NEUROMUSCULAR REEDUCATION: CPT | Performed by: PHYSICAL THERAPIST

## 2021-06-07 PROCEDURE — 97110 THERAPEUTIC EXERCISES: CPT | Performed by: PHYSICAL THERAPIST

## 2021-06-07 NOTE — PROGRESS NOTES
Physical Therapy Daily Progress Note    Patient: Xena Caicedo   : 2000  Diagnosis/ICD-10 Code:  Sprain of left ankle, unspecified ligament, initial encounter [S93.402A]  Referring practitioner: TALA Houston  Date of Initial Visit: Type: THERAPY  Noted: 2021  Today's Date: 2021  Patient seen for 10 sessions         Xena Caicedo reports: Left ankle still feels painful in front/side.  Reports that ankle gets tired very easily throughout the day.        Objective   See Exercise, Manual, and Modality Logs for complete treatment.     Goals  In three weeks, patient will demonstrate at least 25% reduction in pain level in left foot/ankle. NM  In three weeks, patient will demonstrate at least 4+/5 muscular strength in left foot/ankle. NM    In six weeks, patient will demonstrate at least 50% improvement in gait mechanics. met  In six weeks, patient will demonstrate at least 10 seconds SLS on LLE. NM  In six weeks, patient will demonstrate decreased perceived disability by decreasing score on FAAM by at least 10 points. NM    Assessment/Plan    Patient has progressed well towards pain, ROM and strength goals, however still has deficits in all three.  Is appropriate to continue PT intervention in order to continue progressing towards these goals.     Progress per Plan of Care           Manual Therapy:         mins  69316;  Therapeutic Exercise:    30     mins  84876;     Neuromuscular Padma:    15    mins  09929;    Therapeutic Activity:          mins  96125;     Gait Training:           mins  42235;     Ultrasound:          mins  46292;    Electrical Stimulation:         mins  39513 ( );  Dry Needling          mins self-pay    Timed Treatment:   45   mins   Total Treatment:     45   mins    Romel Mayes PT  Physical Therapist

## 2021-06-15 ENCOUNTER — TREATMENT (OUTPATIENT)
Dept: PHYSICAL THERAPY | Facility: CLINIC | Age: 21
End: 2021-06-15

## 2021-06-15 DIAGNOSIS — S93.402A SPRAIN OF LEFT ANKLE, UNSPECIFIED LIGAMENT, INITIAL ENCOUNTER: Primary | ICD-10-CM

## 2021-06-15 PROCEDURE — 97112 NEUROMUSCULAR REEDUCATION: CPT | Performed by: PHYSICAL THERAPIST

## 2021-06-15 PROCEDURE — 97140 MANUAL THERAPY 1/> REGIONS: CPT | Performed by: PHYSICAL THERAPIST

## 2021-06-15 PROCEDURE — 97110 THERAPEUTIC EXERCISES: CPT | Performed by: PHYSICAL THERAPIST

## 2021-06-15 NOTE — PROGRESS NOTES
Physical Therapy Daily Progress Note    Patient: Xena Caicedo   : 2000  Diagnosis/ICD-10 Code:  Sprain of left ankle, unspecified ligament, initial encounter [S93.402A]  Referring practitioner: TALA Houston  Date of Initial Visit: Type: THERAPY  Noted: 2021  Today's Date: 6/15/2021  Patient seen for 11 sessions         Xena Caicedo reports:  Left ankle has been fatigued and increasingly painful over the past few days.  Reports that she did go swimming for about an hour a few days ago, however no other increase in activity level.       Objective   See Exercise, Manual, and Modality Logs for complete treatment.       Assessment/Plan     Tolerates manual therapy and exercise well this visit.  Fatigue reported at end of session, however no noted instability with exercises.     Progress per Plan of Care           Manual Therapy:    15     mins  48934;  Therapeutic Exercise:    15     mins  44507;     Neuromuscular Padma:    10    mins  71679;    Therapeutic Activity:          mins  91361;     Gait Training:           mins  44335;     Ultrasound:          mins  46991;    Electrical Stimulation:         mins  23939 ( );  Dry Needling          mins self-pay    Timed Treatment:   40   mins   Total Treatment:     40   mins    Romel Mayes PT  Physical Therapist

## 2021-06-16 ENCOUNTER — OFFICE VISIT (OUTPATIENT)
Dept: PODIATRY | Facility: CLINIC | Age: 21
End: 2021-06-16

## 2021-06-16 VITALS
WEIGHT: 287 LBS | BODY MASS INDEX: 52.81 KG/M2 | SYSTOLIC BLOOD PRESSURE: 120 MMHG | HEART RATE: 81 BPM | HEIGHT: 62 IN | DIASTOLIC BLOOD PRESSURE: 80 MMHG

## 2021-06-16 DIAGNOSIS — M25.372 ANKLE INSTABILITY, LEFT: ICD-10-CM

## 2021-06-16 DIAGNOSIS — S93.402S MODERATE LEFT ANKLE SPRAIN, SEQUELA: ICD-10-CM

## 2021-06-16 DIAGNOSIS — M76.822 POSTERIOR TIBIAL TENDINITIS OF LEFT LEG: ICD-10-CM

## 2021-06-16 DIAGNOSIS — M25.572 ACUTE LEFT ANKLE PAIN: Primary | ICD-10-CM

## 2021-06-16 DIAGNOSIS — E66.01 MORBID OBESITY WITH BMI OF 50.0-59.9, ADULT (HCC): ICD-10-CM

## 2021-06-16 PROCEDURE — 99213 OFFICE O/P EST LOW 20 MIN: CPT | Performed by: PODIATRIST

## 2021-06-17 NOTE — PROGRESS NOTES
"06/16/2021  Foot and Ankle Surgery - Established Patient/Follow-up  Provider: Dr. Jewel Walker DPM  Location: [facility]    Subjective:  Xena Caicedo is a 20 y.o. female.     Chief Complaint   Patient presents with   • Left Ankle - Follow-up       HPI: Patient returns for follow-up regarding her left ankle pain.  She states that she continues to have discomfort.  She has noticed improvement in the lace up ankle brace but states that symptoms remain present with increased activity.  She is concerned that the symptoms will progress.  She does not feel that she has confidence in the ankle yet.    No Known Allergies    Current Outpatient Medications on File Prior to Visit   Medication Sig Dispense Refill   • cyclobenzaprine (FLEXERIL) 10 MG tablet Take 10 mg by mouth 3 (Three) Times a Day As Needed for Muscle Spasms.     • diclofenac (VOLTAREN) 50 MG EC tablet Take 50 mg by mouth 2 (Two) Times a Day As Needed.       No current facility-administered medications on file prior to visit.       Objective   /80   Pulse 81   Ht 157.5 cm (62\")   Wt 130 kg (287 lb)   BMI 52.49 kg/m²      General:   Appearance: appears stated age and healthy and obesity    Orientation: AAOx3    Affect: appropriate    Gait: antalgic       VASCULAR       Left Foot Vascularity   Normal vascular exam    Dorsalis pedis:  2+  Posterior tibial:  2+  Skin Temperature: warm    Edema Grading:  None  CFT:  < 3 seconds  Pedal Hair Growth:  Present  Varicosities: none        NEUROLOGIC      Left Foot Neurologic   Light touch sensation:  Normal  Hot/cold sensation: normal    Achilles reflex:  2+      MUSCULOSKELETAL       Left Foot Musculoskeletal   Ecchymosis:  None  Tenderness: lateral malleolus, subtalar joint and posterior tibial tendon    Arch:  Pes planus      MUSCLE STRENGTH      Left Foot Muscle Strength   Normal strength    Foot dorsiflexion:  5  Foot plantar flexion:  5  Foot inversion:  5  Foot eversion:  5      " DERMATOLOGIC      Left Foot Dermatologic   Skin: skin intact        TESTS      Left Foot Tests   Anterior drawer: positive    Varus tilt: negative    Heel raise: pain      Continued discomfort with palpation involving the medial and lateral aspects of the ankle.    Assessment/Plan   Diagnoses and all orders for this visit:    1. Acute left ankle pain (Primary)  -     Ambulatory Referral to Physical Therapy Evaluate and treat    2. Moderate left ankle sprain, sequela    3. Ankle instability, left    4. Posterior tibial tendinitis of left leg    5. Morbid obesity with BMI of 50.0-59.9, adult (CMS/[facility])      Patient continues to have discomfort involving the ankle.  She does have instability with anterior drawer testing.  No significant changes are noticed on physical exam.  She has dependent on the lace up ankle brace with mild improvement in symptoms.  She has been trying to perform range of motion exercises.  I do feel that it is important to proceed with formal physical therapy at this time.  We did review importance of proper support with appropriate shoes.  Patient states that she understands.  We did discuss appropriate use of OTC anti-inflammatories when necessary.  I would like to see her in 3 to 4 weeks for reevaluation.  If she continues to have significant pain and limitation, will consider proceeding with an MRI for further evaluation of the ankle    Orders Placed This Encounter   Procedures   • Ambulatory Referral to Physical Therapy Evaluate and treat     Referral Priority:   Routine     Referral Type:   Physical Therapy     Referral Reason:   Specialty Services Required     Requested Specialty:   Physical Therapy     Number of Visits Requested:   1          Note is dictated utilizing voice recognition software. Unfortunately this leads to occasional typographical errors. I apologize in advance if the situation occurs. If questions occur please do not hesitate to call our office.

## 2021-07-06 ENCOUNTER — TREATMENT (OUTPATIENT)
Dept: PHYSICAL THERAPY | Facility: CLINIC | Age: 21
End: 2021-07-06

## 2021-07-06 DIAGNOSIS — S93.402A SPRAIN OF LEFT ANKLE, UNSPECIFIED LIGAMENT, INITIAL ENCOUNTER: Primary | ICD-10-CM

## 2021-07-06 PROCEDURE — 97110 THERAPEUTIC EXERCISES: CPT | Performed by: PHYSICAL THERAPIST

## 2021-07-06 PROCEDURE — 97112 NEUROMUSCULAR REEDUCATION: CPT | Performed by: PHYSICAL THERAPIST

## 2021-07-06 PROCEDURE — 97140 MANUAL THERAPY 1/> REGIONS: CPT | Performed by: PHYSICAL THERAPIST

## 2021-07-06 NOTE — PROGRESS NOTES
Physical Therapy Daily Progress Note    Patient: Xena Caicedo   : 2000  Diagnosis/ICD-10 Code:  Sprain of left ankle, unspecified ligament, initial encounter [S93.402A]  Referring practitioner: TALA Houston  Date of Initial Visit: Type: THERAPY  Noted: 2021  Today's Date: 2021  Patient seen for 12 sessions         Xena Caicedo reports:  Left ankle still sore at times.  States that she has follow up with MD tomorrow.     Objective   See Exercise, Manual, and Modality Logs for complete treatment.       Assessment/Plan     Noted full muscular strength in all four planes of ankle mobility.  Patient demonstrates tentativeness with stepping during both walking on treadmill and during proprioception activities.  Will continue based on MD recommendation.      Progress per Plan of Care           Manual Therapy:    15     mins  79816;  Therapeutic Exercise:    15     mins  41374;     Neuromuscular Padma:    10    mins  78239;    Therapeutic Activity:          mins  15254;     Gait Training:           mins  56210;     Ultrasound:          mins  66996;    Electrical Stimulation:         mins  22557 ( );  Dry Needling          mins self-pay    Timed Treatment:   40   mins   Total Treatment:     40   mins    Romel Mayes PT  Physical Therapist

## 2021-07-07 ENCOUNTER — OFFICE VISIT (OUTPATIENT)
Dept: PODIATRY | Facility: CLINIC | Age: 21
End: 2021-07-07

## 2021-07-07 VITALS
WEIGHT: 287 LBS | BODY MASS INDEX: 52.81 KG/M2 | HEART RATE: 76 BPM | SYSTOLIC BLOOD PRESSURE: 108 MMHG | DIASTOLIC BLOOD PRESSURE: 72 MMHG | HEIGHT: 62 IN

## 2021-07-07 DIAGNOSIS — M25.372 ANKLE INSTABILITY, LEFT: ICD-10-CM

## 2021-07-07 DIAGNOSIS — E66.01 MORBID OBESITY WITH BMI OF 50.0-59.9, ADULT (HCC): ICD-10-CM

## 2021-07-07 DIAGNOSIS — M25.572 CHRONIC PAIN OF LEFT ANKLE: Primary | ICD-10-CM

## 2021-07-07 DIAGNOSIS — S93.402S MODERATE LEFT ANKLE SPRAIN, SEQUELA: ICD-10-CM

## 2021-07-07 DIAGNOSIS — M76.822 POSTERIOR TIBIAL TENDINITIS OF LEFT LEG: ICD-10-CM

## 2021-07-07 DIAGNOSIS — G89.29 CHRONIC PAIN OF LEFT ANKLE: Primary | ICD-10-CM

## 2021-07-07 PROCEDURE — 99213 OFFICE O/P EST LOW 20 MIN: CPT | Performed by: PODIATRIST

## 2021-07-08 NOTE — PROGRESS NOTES
"07/07/2021  Foot and Ankle Surgery - Established Patient/Follow-up  Provider: Dr. Jewel Walker DPM  Location: [facility]    Subjective:  Xena Caicedo is a 20 y.o. female.     Chief Complaint   Patient presents with   • Left Ankle - Follow-up       HPI: Patient returns for issues involving her left foot and ankle.  She continues to wear the brace and has been participating in physical therapy.  She has noticed some improvement with PT but continues to have limitation discomfort.  No other issues today    No Known Allergies    Current Outpatient Medications on File Prior to Visit   Medication Sig Dispense Refill   • cyclobenzaprine (FLEXERIL) 10 MG tablet Take 10 mg by mouth 3 (Three) Times a Day As Needed for Muscle Spasms.     • diclofenac (VOLTAREN) 50 MG EC tablet Take 50 mg by mouth 2 (Two) Times a Day As Needed.       No current facility-administered medications on file prior to visit.       Objective   /72   Pulse 76   Ht 157.5 cm (62\")   Wt 130 kg (287 lb)   BMI 52.49 kg/m²      General:   Appearance: appears stated age and healthy and obesity    Orientation: AAOx3    Affect: appropriate    Gait: antalgic       VASCULAR       Left Foot Vascularity   Normal vascular exam    Dorsalis pedis:  2+  Posterior tibial:  2+  Skin Temperature: warm    Edema Grading:  None  CFT:  < 3 seconds  Pedal Hair Growth:  Present  Varicosities: none        NEUROLOGIC      Left Foot Neurologic   Light touch sensation:  Normal  Hot/cold sensation: normal    Achilles reflex:  2+      MUSCULOSKELETAL       Left Foot Musculoskeletal   Ecchymosis:  None  Tenderness: lateral malleolus, subtalar joint and posterior tibial tendon    Arch:  Pes planus      MUSCLE STRENGTH      Left Foot Muscle Strength   Normal strength    Foot dorsiflexion:  5  Foot plantar flexion:  5  Foot inversion:  5  Foot eversion:  5      DERMATOLOGIC      Left Foot Dermatologic   Skin: skin intact        TESTS      Left Foot Tests "   Anterior drawer: positive    Varus tilt: negative    Heel raise: pain       Continued discomfort with palpation involving the medial and lateral aspects of the ankle.    Assessment/Plan   Diagnoses and all orders for this visit:    1. Chronic pain of left ankle (Primary)  -     Ambulatory Referral to Physical Therapy Evaluate and treat; Stretching, ROM, Strengthening    2. Moderate left ankle sprain, sequela    3. Ankle instability, left    4. Posterior tibial tendinitis of left leg    5. Morbid obesity with BMI of 50.0-59.9, adult (CMS/[facility])      Patient's physical exam is unchanged.  She continues to have discomfort involving the medial and lateral aspects of the ankle with mild instability.  She has noticed some improvement with physical therapy and continues to depend on the lace up brace.  We did review alternative options of proceeding with MRI and surgical discussion as needed.  Patient does not want to consider any operative intervention at this time.  Given that she has noticed some improvement with PT, I have asked that we continue to observe him proceed with therapy.  Patient understands and agrees.  I have asked that she gradually try to discontinue the use of the lace up ankle brace and return to baseline activity as tolerated.  I have asked that she return in 2 months for reevaluation if she continues to have pain and limitation, may need to consider proceeding with the MRI for further evaluation.    Orders Placed This Encounter   Procedures   • Ambulatory Referral to Physical Therapy Evaluate and treat; Stretching, ROM, Strengthening     Referral Priority:   Routine     Referral Type:   Physical Therapy     Referral Reason:   Specialty Services Required     Requested Specialty:   Physical Therapy     Number of Visits Requested:   1          Note is dictated utilizing voice recognition software. Unfortunately this leads to occasional typographical errors. I apologize in advance if the situation  occurs. If questions occur please do not hesitate to call our office.

## 2021-07-14 ENCOUNTER — TELEPHONE (OUTPATIENT)
Dept: ORTHOPEDIC SURGERY | Facility: CLINIC | Age: 21
End: 2021-07-14

## 2021-07-19 ENCOUNTER — TELEPHONE (OUTPATIENT)
Dept: ORTHOPEDIC SURGERY | Facility: CLINIC | Age: 21
End: 2021-07-19

## 2021-07-19 NOTE — TELEPHONE ENCOUNTER
Hub staff attempted to follow warm transfer process and was unsuccessful     Caller: RAMIREZ SINGH    Relationship to patient: SELF     Best call back number: [phone]    Patient is needing: PATIENT SAYS THAT HER RETURN TO WORK NOTE SHOULD HAVE TOMORROW'S DATE OF 7/20/2021 ON IT AND IT HAS THE DATE OF 8/2/2021. PLEASE CALL PATIENT ASAP AND FAX A CORRECTED WORK NOTE [phone]. PLEASE CALL PATIENT ASAP. SHE STARTS HER NEW JOB AT 11AM ON 7/20/2021.

## 2021-12-20 ENCOUNTER — HOSPITAL ENCOUNTER (EMERGENCY)
Facility: HOSPITAL | Age: 21
Discharge: HOME | End: 2021-12-20
Attending: EMERGENCY MEDICINE | Admitting: EMERGENCY MEDICINE

## 2021-12-20 VITALS
HEIGHT: 69 IN | DIASTOLIC BLOOD PRESSURE: 82 MMHG | TEMPERATURE: 98 F | SYSTOLIC BLOOD PRESSURE: 125 MMHG | WEIGHT: 274.03 LBS | OXYGEN SATURATION: 100 % | BODY MASS INDEX: 40.59 KG/M2 | HEART RATE: 98 BPM | RESPIRATION RATE: 20 BRPM

## 2021-12-20 DIAGNOSIS — J06.9 VIRAL URI: ICD-10-CM

## 2021-12-20 DIAGNOSIS — Z20.822 COVID-19 RULED OUT: Primary | ICD-10-CM

## 2021-12-20 LAB
B PARAPERT DNA SPEC QL NAA+PROBE: NOT DETECTED
B PERT DNA SPEC QL NAA+PROBE: NOT DETECTED
C PNEUM DNA NPH QL NAA+NON-PROBE: NOT DETECTED
FLUAV SUBTYP SPEC NAA+PROBE: NOT DETECTED
FLUBV RNA ISLT QL NAA+PROBE: NOT DETECTED
HADV DNA SPEC NAA+PROBE: NOT DETECTED
HCOV 229E RNA SPEC QL NAA+PROBE: NOT DETECTED
HCOV HKU1 RNA SPEC QL NAA+PROBE: NOT DETECTED
HCOV NL63 RNA SPEC QL NAA+PROBE: NOT DETECTED
HCOV OC43 RNA SPEC QL NAA+PROBE: NOT DETECTED
HMPV RNA NPH QL NAA+NON-PROBE: DETECTED
HPIV1 RNA ISLT QL NAA+PROBE: NOT DETECTED
HPIV2 RNA SPEC QL NAA+PROBE: NOT DETECTED
HPIV3 RNA NPH QL NAA+PROBE: NOT DETECTED
HPIV4 P GENE NPH QL NAA+PROBE: NOT DETECTED
M PNEUMO IGG SER IA-ACNC: NOT DETECTED
RHINOVIRUS RNA SPEC NAA+PROBE: NOT DETECTED
RSV RNA NPH QL NAA+NON-PROBE: NOT DETECTED
SARS-COV-2 RNA NPH QL NAA+NON-PROBE: NOT DETECTED

## 2021-12-20 PROCEDURE — 99283 EMERGENCY DEPT VISIT LOW MDM: CPT

## 2021-12-20 PROCEDURE — 0202U NFCT DS 22 TRGT SARS-COV-2: CPT | Performed by: EMERGENCY MEDICINE

## 2021-12-21 NOTE — ED NOTES
Mom reports sinus pressure and congestion x 2 days. Feels generally bad. Son is also sick     Ana Gonzales, RN  12/20/21 1934

## 2021-12-21 NOTE — ED PROVIDER NOTES
Subjective   Patient is a 18-year-old female with cough and congestion with diarrhea for the past several days.  She denies fever chest pain or other complaint.  She states she had Covid approximately 6 months ago.          Review of Systems  Negative for headache earache sore throat fever vomiting dysuria or other complaint  History reviewed. No pertinent past medical history.    Allergies   Allergen Reactions   • Amoxicillin Hives       History reviewed. No pertinent surgical history.    Family History   Problem Relation Age of Onset   • Heart attack Mother        Social History     Socioeconomic History   • Marital status: Single   Tobacco Use   • Smoking status: Never Smoker   • Smokeless tobacco: Never Used   Vaping Use   • Vaping Use: Some days   Substance and Sexual Activity   • Alcohol use: Never   • Drug use: Never   • Sexual activity: Defer           Objective   Physical Exam  HEENT exam shows TMs to be clear.  Oropharynx comers but sclera nonicteric.  Neck has no adenopathy.  Lungs are clear.  Heart has a regular rate rhythm without murmur rub or gallop.  Chest is nontender.  Abdomen is soft nontender.  Extremities M is unremarkable.  Procedures           ED Course            Results for orders placed or performed during the hospital encounter of 12/20/21   Respiratory Panel PCR w/COVID-19(SARS-CoV-2) JING/BRENDA/NIC/PAD/COR/MAD/RENAN In-House, NP Swab in UTM/VTM, 3-4 HR TAT - Swab, Nasopharynx    Specimen: Nasopharynx; Swab   Result Value Ref Range    ADENOVIRUS, PCR Not Detected Not Detected    Coronavirus 229E Not Detected Not Detected    Coronavirus HKU1 Not Detected Not Detected    Coronavirus NL63 Not Detected Not Detected    Coronavirus OC43 Not Detected Not Detected    COVID19 Not Detected Not Detected - Ref. Range    Human Metapneumovirus Detected (A) Not Detected    Human Rhinovirus/Enterovirus Not Detected Not Detected    Influenza A PCR Not Detected Not Detected    Influenza B PCR Not Detected Not  Detected    Parainfluenza Virus 1 Not Detected Not Detected    Parainfluenza Virus 2 Not Detected Not Detected    Parainfluenza Virus 3 Not Detected Not Detected    Parainfluenza Virus 4 Not Detected Not Detected    RSV, PCR Not Detected Not Detected    Bordetella pertussis pcr Not Detected Not Detected    Bordetella parapertussis PCR Not Detected Not Detected    Chlamydophila pneumoniae PCR Not Detected Not Detected    Mycoplasma pneumo by PCR Not Detected Not Detected                                             MDM  Number of Diagnoses or Management Options  Diagnosis management comments: Patient is positive for metapneumovirus.  Patient will be discharged.  She will drink plenty fluids Tylenol or Profen for fever or achiness.  She will see MD for recheck as needed.       Amount and/or Complexity of Data Reviewed  Clinical lab tests: reviewed    Risk of Complications, Morbidity, and/or Mortality  Presenting problems: moderate  Diagnostic procedures: moderate  Management options: moderate    Patient Progress  Patient progress: stable      Final diagnoses:   COVID-19 ruled out   Viral URI       ED Disposition  ED Disposition     ED Disposition Condition Comment    Discharge Stable           No follow-up provider specified.       Medication List      No changes were made to your prescriptions during this visit.          Malik Hansen MD  12/20/21 2006

## 2022-02-15 ENCOUNTER — TRANSCRIBE ORDERS (OUTPATIENT)
Dept: ADMINISTRATIVE | Facility: HOSPITAL | Age: 22
End: 2022-02-15

## 2022-02-15 ENCOUNTER — LAB (OUTPATIENT)
Dept: LAB | Facility: HOSPITAL | Age: 22
End: 2022-02-15

## 2022-02-15 DIAGNOSIS — Z11.52 ENCOUNTER FOR SCREENING FOR SEVERE ACUTE RESPIRATORY SYNDROME CORONAVIRUS 2 (SARS-COV-2) INFECTION: Primary | ICD-10-CM

## 2022-02-15 DIAGNOSIS — Z11.52 ENCOUNTER FOR SCREENING FOR SEVERE ACUTE RESPIRATORY SYNDROME CORONAVIRUS 2 (SARS-COV-2) INFECTION: ICD-10-CM

## 2022-02-15 LAB — SARS-COV-2 ORF1AB RESP QL NAA+PROBE: NOT DETECTED

## 2022-02-15 PROCEDURE — C9803 HOPD COVID-19 SPEC COLLECT: HCPCS

## 2022-02-15 PROCEDURE — U0004 COV-19 TEST NON-CDC HGH THRU: HCPCS

## 2022-05-11 ENCOUNTER — HOSPITAL ENCOUNTER (EMERGENCY)
Facility: HOSPITAL | Age: 22
Discharge: HOME | End: 2022-05-11
Attending: EMERGENCY MEDICINE | Admitting: EMERGENCY MEDICINE

## 2022-05-11 ENCOUNTER — APPOINTMENT (OUTPATIENT)
Dept: CT IMAGING | Facility: HOSPITAL | Age: 22
End: 2022-05-11

## 2022-05-11 VITALS
WEIGHT: 275.13 LBS | OXYGEN SATURATION: 100 % | SYSTOLIC BLOOD PRESSURE: 117 MMHG | HEART RATE: 84 BPM | RESPIRATION RATE: 20 BRPM | DIASTOLIC BLOOD PRESSURE: 75 MMHG | HEIGHT: 68 IN | BODY MASS INDEX: 41.7 KG/M2 | TEMPERATURE: 98 F

## 2022-05-11 DIAGNOSIS — R25.1 SHAKINESS: ICD-10-CM

## 2022-05-11 DIAGNOSIS — R51.9 NONINTRACTABLE HEADACHE, UNSPECIFIED CHRONICITY PATTERN, UNSPECIFIED HEADACHE TYPE: ICD-10-CM

## 2022-05-11 DIAGNOSIS — R53.1 WEAKNESS: Primary | ICD-10-CM

## 2022-05-11 LAB
ALBUMIN SERPL-MCNC: 4.3 G/DL (ref 3.5–5.2)
ALBUMIN/GLOB SERPL: 1.4 G/DL
ALP SERPL-CCNC: 87 U/L (ref 39–117)
ALT SERPL W P-5'-P-CCNC: 13 U/L (ref 1–33)
ANION GAP SERPL CALCULATED.3IONS-SCNC: 12 MMOL/L (ref 5–15)
AST SERPL-CCNC: 15 U/L (ref 1–32)
B-HCG UR QL: NEGATIVE
BACTERIA UR QL AUTO: ABNORMAL /HPF
BASOPHILS # BLD AUTO: 0.1 10*3/MM3 (ref 0–0.2)
BASOPHILS NFR BLD AUTO: 1 % (ref 0–1.5)
BILIRUB SERPL-MCNC: 0.3 MG/DL (ref 0–1.2)
BILIRUB UR QL STRIP: NEGATIVE
BUN SERPL-MCNC: 8 MG/DL (ref 6–20)
BUN/CREAT SERPL: 12.3 (ref 7–25)
CALCIUM SPEC-SCNC: 9.3 MG/DL (ref 8.6–10.5)
CHLORIDE SERPL-SCNC: 104 MMOL/L (ref 98–107)
CLARITY UR: CLEAR
CO2 SERPL-SCNC: 23 MMOL/L (ref 22–29)
COLOR UR: YELLOW
CREAT SERPL-MCNC: 0.65 MG/DL (ref 0.57–1)
DEPRECATED RDW RBC AUTO: 41.1 FL (ref 37–54)
EGFRCR SERPLBLD CKD-EPI 2021: 127.1 ML/MIN/1.73
EOSINOPHIL # BLD AUTO: 0.1 10*3/MM3 (ref 0–0.4)
EOSINOPHIL NFR BLD AUTO: 1.2 % (ref 0.3–6.2)
ERYTHROCYTE [DISTWIDTH] IN BLOOD BY AUTOMATED COUNT: 14 % (ref 12.3–15.4)
GLOBULIN UR ELPH-MCNC: 3.1 GM/DL
GLUCOSE BLDC GLUCOMTR-MCNC: 108 MG/DL (ref 70–105)
GLUCOSE SERPL-MCNC: 133 MG/DL (ref 65–99)
GLUCOSE UR STRIP-MCNC: NEGATIVE MG/DL
HCT VFR BLD AUTO: 36.7 % (ref 34–46.6)
HGB BLD-MCNC: 12.3 G/DL (ref 12–15.9)
HGB UR QL STRIP.AUTO: NEGATIVE
HYALINE CASTS UR QL AUTO: ABNORMAL /LPF
KETONES UR QL STRIP: NEGATIVE
LEUKOCYTE ESTERASE UR QL STRIP.AUTO: ABNORMAL
LYMPHOCYTES # BLD AUTO: 2.1 10*3/MM3 (ref 0.7–3.1)
LYMPHOCYTES NFR BLD AUTO: 26.1 % (ref 19.6–45.3)
MAGNESIUM SERPL-MCNC: 1.9 MG/DL (ref 1.6–2.6)
MCH RBC QN AUTO: 28.1 PG (ref 26.6–33)
MCHC RBC AUTO-ENTMCNC: 33.7 G/DL (ref 31.5–35.7)
MCV RBC AUTO: 83.4 FL (ref 79–97)
MONOCYTES # BLD AUTO: 0.6 10*3/MM3 (ref 0.1–0.9)
MONOCYTES NFR BLD AUTO: 7 % (ref 5–12)
NEUTROPHILS NFR BLD AUTO: 5.2 10*3/MM3 (ref 1.7–7)
NEUTROPHILS NFR BLD AUTO: 64.7 % (ref 42.7–76)
NITRITE UR QL STRIP: NEGATIVE
NRBC BLD AUTO-RTO: 0.2 /100 WBC (ref 0–0.2)
PH UR STRIP.AUTO: 6 [PH] (ref 5–8)
PLATELET # BLD AUTO: 291 10*3/MM3 (ref 140–450)
PMV BLD AUTO: 8.9 FL (ref 6–12)
POTASSIUM SERPL-SCNC: 4.2 MMOL/L (ref 3.5–5.2)
PROT SERPL-MCNC: 7.4 G/DL (ref 6–8.5)
PROT UR QL STRIP: NEGATIVE
RBC # BLD AUTO: 4.4 10*6/MM3 (ref 3.77–5.28)
RBC # UR STRIP: ABNORMAL /HPF
REF LAB TEST METHOD: ABNORMAL
SODIUM SERPL-SCNC: 139 MMOL/L (ref 136–145)
SP GR UR STRIP: 1.01 (ref 1–1.03)
SQUAMOUS #/AREA URNS HPF: ABNORMAL /HPF
TSH SERPL DL<=0.05 MIU/L-ACNC: 1.9 UIU/ML (ref 0.27–4.2)
UROBILINOGEN UR QL STRIP: ABNORMAL
WBC # UR STRIP: ABNORMAL /HPF
WBC NRBC COR # BLD: 8 10*3/MM3 (ref 3.4–10.8)

## 2022-05-11 PROCEDURE — 83735 ASSAY OF MAGNESIUM: CPT | Performed by: NURSE PRACTITIONER

## 2022-05-11 PROCEDURE — 99283 EMERGENCY DEPT VISIT LOW MDM: CPT

## 2022-05-11 PROCEDURE — 70450 CT HEAD/BRAIN W/O DYE: CPT

## 2022-05-11 PROCEDURE — 81025 URINE PREGNANCY TEST: CPT | Performed by: NURSE PRACTITIONER

## 2022-05-11 PROCEDURE — 81001 URINALYSIS AUTO W/SCOPE: CPT | Performed by: NURSE PRACTITIONER

## 2022-05-11 PROCEDURE — 36415 COLL VENOUS BLD VENIPUNCTURE: CPT

## 2022-05-11 PROCEDURE — 82962 GLUCOSE BLOOD TEST: CPT

## 2022-05-11 PROCEDURE — 80050 GENERAL HEALTH PANEL: CPT | Performed by: NURSE PRACTITIONER

## 2022-05-11 NOTE — ED PROVIDER NOTES
Subjective   Patient is a 21-year-old white female with no significant medical history who presents today with complaints of feeling generally weak, shaky and jittery dizzy.  She states her symptoms started 3 days ago and have been intermittent.  She states today at work she started feeling this way and thought maybe her sugar was low.  She states she ate some food but had minimal improvement in her symptoms.  States she has had some headaches generalized no thunderclap onset.  She denies any unilateral weakness or deficit.  She denies fever chills neck pain or stiffness.  She states at times her vision appears blurry at times.  She denies any nausea vomiting diarrhea or urinary symptoms.          Review of Systems   Constitutional: Negative for chills and fever.   HENT: Negative for congestion and sore throat.    Eyes: Positive for visual disturbance.   Respiratory: Negative for cough and shortness of breath.    Cardiovascular: Negative for chest pain and palpitations.   Gastrointestinal: Negative for abdominal pain, diarrhea, nausea and vomiting.   Genitourinary: Negative.    Musculoskeletal: Negative.    Skin: Negative.    Neurological: Positive for dizziness, tremors, weakness, light-headedness and headaches. Negative for syncope, facial asymmetry, speech difficulty and numbness.       No past medical history on file.    Allergies   Allergen Reactions   • Amoxicillin Hives       No past surgical history on file.    Family History   Problem Relation Age of Onset   • Heart attack Mother        Social History     Socioeconomic History   • Marital status: Single   Tobacco Use   • Smoking status: Never Smoker   • Smokeless tobacco: Never Used   Vaping Use   • Vaping Use: Every day   Substance and Sexual Activity   • Alcohol use: Never     Comment: rarely   • Drug use: Never   • Sexual activity: Defer           Objective   Physical Exam  Vital signs and triage nurse note reviewed.  Constitutional: Awake, alert;  well-developed and well-nourished. No acute distress is noted.  HEENT: Normocephalic, atraumatic; pupils are PERRL with intact EOM; oropharynx is pink and moist without exudate or erythema.  No drooling or pooling of oral secretions.  Neck: Supple, full range of motion without pain; no cervical lymphadenopathy. Normal phonation.  Cardiovascular: Regular rate and rhythm, normal S1-S2.  No murmur noted.  Pulmonary: Respiratory effort regular nonlabored, breath sounds clear to auscultation all fields.  Abdomen: Soft, nontender, nondistended with normoactive bowel sounds; no rebound or guarding.  Musculoskeletal: Independent range of motion of all extremities with no palpable tenderness or edema.  Neuro: Alert oriented x3, speech is clear and appropriate, GCS 15.  No focal sensorimotor deficits noted. No facial asymmetry. Muscle strength 5/5 bilateral. Cranial nerves 2-12 grossly intact. Normal coordination.  No gaze, deviation, or nystagmus.  Follows commands.  Skin: Flesh tone, warm, dry, intact; no erythematous or petechial rash or lesion.    Procedures           ED Course      Labs Reviewed   COMPREHENSIVE METABOLIC PANEL - Abnormal; Notable for the following components:       Result Value    Glucose 133 (*)     All other components within normal limits    Narrative:     GFR Normal >60  Chronic Kidney Disease <60  Kidney Failure <15     URINALYSIS W/ MICROSCOPIC IF INDICATED (NO CULTURE) - Abnormal; Notable for the following components:    Leuk Esterase, UA Small (1+) (*)     All other components within normal limits   URINALYSIS, MICROSCOPIC ONLY - Abnormal; Notable for the following components:    RBC, UA 0-2 (*)     WBC, UA 6-12 (*)     All other components within normal limits   POCT GLUCOSE FINGERSTICK - Abnormal; Notable for the following components:    Glucose 108 (*)     All other components within normal limits   PREGNANCY, URINE - Normal   MAGNESIUM - Normal   TSH - Normal   CBC WITH AUTO DIFFERENTIAL -  Normal   CBC AND DIFFERENTIAL    Narrative:     The following orders were created for panel order CBC & Differential.  Procedure                               Abnormality         Status                     ---------                               -----------         ------                     CBC Auto Differential[[id number]]        Normal              Final result                 Please view results for these tests on the individual orders.     CT Head Without Contrast    Result Date: 5/11/2022  Normal CT of the brain without contrast.  Electronically Signed By-Rupinder Mcmanus MD On:5/11/2022 12:00 PM This report was finalized on [id number] by  Rupinder Mcmanus MD.    Medications - No data to display                                             MDM  Number of Diagnoses or Management Options  Shakiness  Weakness  Diagnosis management comments: Comorbidities: None  Differentials: Intracranial abnormality, electrolyte disturbance, thyroid disorder, hypoglycemia, anxiety;this list is not all inclusive and does not constitute the entirety of considered causes  Discussion with provider:  Radiology interpretation: X-rays reviewed by me and interpreted by radiologist: As above  Lab interpretation: Labs viewed by me significant for: As above    Patient had labs and CT obtained.    Work-up: CBC and metabolic panel are grossly unremarkable.  TSH within normal limits.  Urinalysis shows no blood or sign of infection.  Urine hCG negative.  CT head shows no acute abnormality.    On reexamination patient is resting comfortably and is in no distress.  She has no new complaints.  She is neurologically stable without focal deficit.  She is ambulatory without difficulty.  She is hemodynamically stable.  She is well-appearing.    Diagnosis and treatment plan discussed with patient.  Patient agreeable to plan.   I discussed findings with patient who voices understanding of discharge instructions, signs and symptoms requiring return to ED;  discharged improved and in stable condition with follow up for re-evaluation.           Amount and/or Complexity of Data Reviewed  Clinical lab tests: reviewed and ordered  Tests in the radiology section of CPT®: reviewed and ordered    Patient Progress  Patient progress: stable      Final diagnoses:   Weakness   Shakiness   Nonintractable headache, unspecified chronicity pattern, unspecified headache type       ED Disposition  ED Disposition     ED Disposition   Discharge    Condition   Stable    Comment   --             PATIENT CONNECTION - [place] [id number]  [phone]             Medication List      Stop    azithromycin 250 MG tablet  Commonly known as: Zithromax Z-Reinaldo     cyclobenzaprine 10 MG tablet  Commonly known as: FLEXERIL     diclofenac 50 MG EC tablet  Commonly known as: VOLTAREN     promethazine-dextromethorphan 6.25-15 MG/5ML syrup  Commonly known as: PROMETHAZINE-Pratibha Deng, CAROLYN  05/11/22 1234

## 2022-05-11 NOTE — ED NOTES
"Pt states 3 days ago her symptoms started and that this morning while at work her trembling and that her vision became blurry. Pt states that she felt like her balance was off a little. Pt states that she ate food at work, because she felt \"jittery\" Pt denies any medical hx or medicine changes.  "

## 2022-05-11 NOTE — DISCHARGE INSTRUCTIONS
Eat regular balanced meals throughout the day.  Drink plenty of fluids.  Follow-up with primary care provider.  Return for new or worsening symptoms.

## 2022-05-12 ENCOUNTER — HOSPITAL ENCOUNTER (EMERGENCY)
Facility: HOSPITAL | Age: 22
Discharge: HOME | End: 2022-05-12
Attending: EMERGENCY MEDICINE | Admitting: EMERGENCY MEDICINE

## 2022-05-12 ENCOUNTER — APPOINTMENT (OUTPATIENT)
Dept: MRI IMAGING | Facility: HOSPITAL | Age: 22
End: 2022-05-12

## 2022-05-12 VITALS
BODY MASS INDEX: 40.92 KG/M2 | DIASTOLIC BLOOD PRESSURE: 72 MMHG | WEIGHT: 270 LBS | HEART RATE: 92 BPM | SYSTOLIC BLOOD PRESSURE: 118 MMHG | TEMPERATURE: 98 F | HEIGHT: 68 IN | RESPIRATION RATE: 18 BRPM | OXYGEN SATURATION: 99 %

## 2022-05-12 DIAGNOSIS — R25.1 TREMORS OF NERVOUS SYSTEM: Primary | ICD-10-CM

## 2022-05-12 DIAGNOSIS — R55 BLACKOUT SPELL: ICD-10-CM

## 2022-05-12 LAB
ALBUMIN SERPL-MCNC: 4.1 G/DL (ref 3.5–5.2)
ALBUMIN/GLOB SERPL: 1.3 G/DL
ALP SERPL-CCNC: 84 U/L (ref 39–117)
ALT SERPL W P-5'-P-CCNC: 12 U/L (ref 1–33)
AMPHET+METHAMPHET UR QL: NEGATIVE
ANION GAP SERPL CALCULATED.3IONS-SCNC: 10 MMOL/L (ref 5–15)
AST SERPL-CCNC: 14 U/L (ref 1–32)
B-HCG UR QL: NEGATIVE
BACTERIA UR QL AUTO: ABNORMAL /HPF
BARBITURATES UR QL SCN: NEGATIVE
BASOPHILS # BLD AUTO: 0.1 10*3/MM3 (ref 0–0.2)
BASOPHILS NFR BLD AUTO: 1.2 % (ref 0–1.5)
BENZODIAZ UR QL SCN: NEGATIVE
BILIRUB SERPL-MCNC: 0.3 MG/DL (ref 0–1.2)
BILIRUB UR QL STRIP: NEGATIVE
BUN SERPL-MCNC: 6 MG/DL (ref 6–20)
BUN/CREAT SERPL: 7.9 (ref 7–25)
CALCIUM SPEC-SCNC: 9.1 MG/DL (ref 8.6–10.5)
CANNABINOIDS SERPL QL: POSITIVE
CHLORIDE SERPL-SCNC: 102 MMOL/L (ref 98–107)
CLARITY UR: CLEAR
CO2 SERPL-SCNC: 27 MMOL/L (ref 22–29)
COCAINE UR QL: NEGATIVE
COLOR UR: YELLOW
CREAT SERPL-MCNC: 0.76 MG/DL (ref 0.57–1)
D DIMER PPP FEU-MCNC: 0.29 MG/L (FEU) (ref 0–0.59)
DEPRECATED RDW RBC AUTO: 40.3 FL (ref 37–54)
EGFRCR SERPLBLD CKD-EPI 2021: 113.1 ML/MIN/1.73
EOSINOPHIL # BLD AUTO: 0.1 10*3/MM3 (ref 0–0.4)
EOSINOPHIL NFR BLD AUTO: 1.2 % (ref 0.3–6.2)
ERYTHROCYTE [DISTWIDTH] IN BLOOD BY AUTOMATED COUNT: 13.7 % (ref 12.3–15.4)
GLOBULIN UR ELPH-MCNC: 3.1 GM/DL
GLUCOSE SERPL-MCNC: 100 MG/DL (ref 65–99)
GLUCOSE UR STRIP-MCNC: NEGATIVE MG/DL
HCT VFR BLD AUTO: 36.2 % (ref 34–46.6)
HGB BLD-MCNC: 11.9 G/DL (ref 12–15.9)
HGB UR QL STRIP.AUTO: NEGATIVE
HYALINE CASTS UR QL AUTO: ABNORMAL /LPF
KETONES UR QL STRIP: ABNORMAL
LEUKOCYTE ESTERASE UR QL STRIP.AUTO: ABNORMAL
LYMPHOCYTES # BLD AUTO: 2.2 10*3/MM3 (ref 0.7–3.1)
LYMPHOCYTES NFR BLD AUTO: 21 % (ref 19.6–45.3)
MAGNESIUM SERPL-MCNC: 1.8 MG/DL (ref 1.6–2.6)
MCH RBC QN AUTO: 27.9 PG (ref 26.6–33)
MCHC RBC AUTO-ENTMCNC: 33 G/DL (ref 31.5–35.7)
MCV RBC AUTO: 84.5 FL (ref 79–97)
METHADONE UR QL SCN: NEGATIVE
MONOCYTES # BLD AUTO: 0.8 10*3/MM3 (ref 0.1–0.9)
MONOCYTES NFR BLD AUTO: 7.7 % (ref 5–12)
NEUTROPHILS NFR BLD AUTO: 68.9 % (ref 42.7–76)
NEUTROPHILS NFR BLD AUTO: 7.2 10*3/MM3 (ref 1.7–7)
NITRITE UR QL STRIP: NEGATIVE
NRBC BLD AUTO-RTO: 0.1 /100 WBC (ref 0–0.2)
NT-PROBNP SERPL-MCNC: 72.9 PG/ML (ref 0–450)
OPIATES UR QL: NEGATIVE
OXYCODONE UR QL SCN: NEGATIVE
PH UR STRIP.AUTO: 6 [PH] (ref 5–8)
PLATELET # BLD AUTO: 302 10*3/MM3 (ref 140–450)
PMV BLD AUTO: 9.5 FL (ref 6–12)
POTASSIUM SERPL-SCNC: 4.1 MMOL/L (ref 3.5–5.2)
PROT SERPL-MCNC: 7.2 G/DL (ref 6–8.5)
PROT UR QL STRIP: NEGATIVE
RBC # BLD AUTO: 4.28 10*6/MM3 (ref 3.77–5.28)
RBC # UR STRIP: ABNORMAL /HPF
REF LAB TEST METHOD: ABNORMAL
SODIUM SERPL-SCNC: 139 MMOL/L (ref 136–145)
SP GR UR STRIP: 1.02 (ref 1–1.03)
SQUAMOUS #/AREA URNS HPF: ABNORMAL /HPF
TROPONIN T SERPL-MCNC: <0.01 NG/ML (ref 0–0.03)
UROBILINOGEN UR QL STRIP: ABNORMAL
WBC # UR STRIP: ABNORMAL /HPF
WBC NRBC COR # BLD: 10.5 10*3/MM3 (ref 3.4–10.8)

## 2022-05-12 PROCEDURE — 85025 COMPLETE CBC W/AUTO DIFF WBC: CPT | Performed by: EMERGENCY MEDICINE

## 2022-05-12 PROCEDURE — 83880 ASSAY OF NATRIURETIC PEPTIDE: CPT | Performed by: EMERGENCY MEDICINE

## 2022-05-12 PROCEDURE — 99284 EMERGENCY DEPT VISIT MOD MDM: CPT

## 2022-05-12 PROCEDURE — 70551 MRI BRAIN STEM W/O DYE: CPT

## 2022-05-12 PROCEDURE — 96374 THER/PROPH/DIAG INJ IV PUSH: CPT

## 2022-05-12 PROCEDURE — 93005 ELECTROCARDIOGRAM TRACING: CPT | Performed by: EMERGENCY MEDICINE

## 2022-05-12 PROCEDURE — 80307 DRUG TEST PRSMV CHEM ANLYZR: CPT | Performed by: EMERGENCY MEDICINE

## 2022-05-12 PROCEDURE — 83735 ASSAY OF MAGNESIUM: CPT | Performed by: EMERGENCY MEDICINE

## 2022-05-12 PROCEDURE — 84484 ASSAY OF TROPONIN QUANT: CPT | Performed by: EMERGENCY MEDICINE

## 2022-05-12 PROCEDURE — 25010000002 LORAZEPAM PER 2 MG: Performed by: EMERGENCY MEDICINE

## 2022-05-12 PROCEDURE — 85379 FIBRIN DEGRADATION QUANT: CPT | Performed by: EMERGENCY MEDICINE

## 2022-05-12 PROCEDURE — 80053 COMPREHEN METABOLIC PANEL: CPT | Performed by: EMERGENCY MEDICINE

## 2022-05-12 PROCEDURE — 81025 URINE PREGNANCY TEST: CPT | Performed by: EMERGENCY MEDICINE

## 2022-05-12 PROCEDURE — 81001 URINALYSIS AUTO W/SCOPE: CPT | Performed by: EMERGENCY MEDICINE

## 2022-05-12 RX ORDER — HYDROXYZINE HYDROCHLORIDE 25 MG/1
25 TABLET, FILM COATED ORAL EVERY 6 HOURS PRN
Qty: 25 TABLET | Refills: 0 | Status: SHIPPED | OUTPATIENT
Start: 2022-05-12 | End: 2022-08-19 | Stop reason: SDUPTHER

## 2022-05-12 RX ORDER — LORAZEPAM 2 MG/ML
1 INJECTION INTRAMUSCULAR ONCE
Status: COMPLETED | OUTPATIENT
Start: 2022-05-12 | End: 2022-05-12

## 2022-05-12 RX ORDER — SODIUM CHLORIDE 0.9 % (FLUSH) 0.9 %
10 SYRINGE (ML) INJECTION AS NEEDED
Status: DISCONTINUED | OUTPATIENT
Start: 2022-05-12 | End: 2022-05-12 | Stop reason: HOSPADM

## 2022-05-12 RX ADMIN — Medication 10 ML: at 18:40

## 2022-05-12 RX ADMIN — SODIUM CHLORIDE 1000 ML: 9 INJECTION, SOLUTION INTRAVENOUS at 18:40

## 2022-05-12 RX ADMIN — LORAZEPAM 1 MG: 2 INJECTION INTRAMUSCULAR; INTRAVENOUS at 18:40

## 2022-05-12 NOTE — ED PROVIDER NOTES
"Subjective   Chief complaint: Patient is a pleasant 21-year-old who presents to the emergency department today with multiple symptoms.  She was seen here yesterday as well.  She states last week has been having increasing stress in her life.  Work is been very stressful.  She gets these tremors at work and blurry vision.  She states that last night twice she blacked out after she came back from the emergency department.  She states that when she blacks out she is awake.  She states that she can feel her boyfriend squeezing her hand but she \"just cannot open my eyes\".  She does not have chest pain or palpitations or shortness of breath.  She states maybe she has some increasing anxiety and stress.  She called to get follow-up appointment and primary physician today after a visit from the emergency department last night.  She cannot get in until the 19th and with these persisting symptoms came in for evaluation.  No abdominal pain.  No vomiting.  No diarrhea.  No weakness or numbness.  This happened at sporadic times.    Context:    Duration: 1 week    Timing: Episodes come and go.    Severity:    Associated Symptoms:        PCP:  LMP:          Review of Systems    No past medical history on file.    Allergies   Allergen Reactions   • Amoxicillin Hives       No past surgical history on file.    Family History   Problem Relation Age of Onset   • Heart attack Mother        Social History     Socioeconomic History   • Marital status: Single   Tobacco Use   • Smoking status: Never Smoker   • Smokeless tobacco: Never Used   Vaping Use   • Vaping Use: Every day   Substance and Sexual Activity   • Alcohol use: Never     Comment: rarely   • Drug use: Never   • Sexual activity: Defer           Objective   Physical Exam  Vitals and nursing note reviewed.   Constitutional:       Appearance: Normal appearance.   HENT:      Head: Normocephalic and atraumatic.   Eyes:      Extraocular Movements: Extraocular movements intact.      " Pupils: Pupils are equal, round, and reactive to light.   Cardiovascular:      Rate and Rhythm: Normal rate.      Pulses: Normal pulses.      Heart sounds: Normal heart sounds.   Pulmonary:      Effort: Pulmonary effort is normal.      Breath sounds: Normal breath sounds.   Abdominal:      Tenderness: There is no abdominal tenderness.   Musculoskeletal:         General: Normal range of motion.      Cervical back: Normal range of motion and neck supple.   Skin:     General: Skin is warm and dry.   Neurological:      General: No focal deficit present.      Mental Status: She is alert and oriented to person, place, and time.      Cranial Nerves: No cranial nerve deficit.      Sensory: No sensory deficit.      Motor: No weakness.      Coordination: Coordination normal.   Psychiatric:      Comments: anxious         Procedures           ED Course      Results for orders placed or performed during the hospital encounter of 05/12/22   Comprehensive Metabolic Panel    Specimen: Blood   Result Value Ref Range    Glucose 100 (H) 65 - 99 mg/dL    BUN 6 6 - 20 mg/dL    Creatinine 0.76 0.57 - 1.00 mg/dL    Sodium 139 136 - 145 mmol/L    Potassium 4.1 3.5 - 5.2 mmol/L    Chloride 102 98 - 107 mmol/L    CO2 27.0 22.0 - 29.0 mmol/L    Calcium 9.1 8.6 - 10.5 mg/dL    Total Protein 7.2 6.0 - 8.5 g/dL    Albumin 4.10 3.50 - 5.20 g/dL    ALT (SGPT) 12 1 - 33 U/L    AST (SGOT) 14 1 - 32 U/L    Alkaline Phosphatase 84 39 - 117 U/L    Total Bilirubin 0.3 0.0 - 1.2 mg/dL    Globulin 3.1 gm/dL    A/G Ratio 1.3 g/dL    BUN/Creatinine Ratio 7.9 7.0 - 25.0    Anion Gap 10.0 5.0 - 15.0 mmol/L    eGFR 113.1 >60.0 mL/min/1.73   Pregnancy, Urine - Urine, Clean Catch    Specimen: Urine, Clean Catch   Result Value Ref Range    HCG, Urine QL Negative Negative   Urinalysis With Microscopic If Indicated (No Culture) - Urine, Clean Catch    Specimen: Urine, Clean Catch   Result Value Ref Range    Color, UA Yellow Yellow, Straw    Appearance, UA Clear  Clear    pH, UA 6.0 5.0 - 8.0    Specific Gravity, UA 1.024 1.005 - 1.030    Glucose, UA Negative Negative    Ketones, UA 15 mg/dL (1+) (A) Negative    Bilirubin, UA Negative Negative    Blood, UA Negative Negative    Protein, UA Negative Negative    Leuk Esterase, UA Small (1+) (A) Negative    Nitrite, UA Negative Negative    Urobilinogen, UA 1.0 E.U./dL 0.2 - 1.0 E.U./dL   D-dimer, Quantitative    Specimen: Blood   Result Value Ref Range    D-Dimer, Quantitative 0.29 0.00 - 0.59 mg/L (FEU)   BNP    Specimen: Blood   Result Value Ref Range    proBNP 72.9 0.0 - 450.0 pg/mL   Urine Drug Screen - Urine, Clean Catch    Specimen: Urine, Clean Catch   Result Value Ref Range    Amphet/Methamphet, Screen Negative Negative    Barbiturates Screen, Urine Negative Negative    Benzodiazepine Screen, Urine Negative Negative    Cocaine Screen, Urine Negative Negative    Opiate Screen Negative Negative    THC, Screen, Urine Positive (A) Negative    Methadone Screen, Urine Negative Negative    Oxycodone Screen, Urine Negative Negative   Magnesium    Specimen: Blood   Result Value Ref Range    Magnesium 1.8 1.6 - 2.6 mg/dL   CBC Auto Differential    Specimen: Blood   Result Value Ref Range    WBC 10.50 3.40 - 10.80 10*3/mm3    RBC 4.28 3.77 - 5.28 10*6/mm3    Hemoglobin 11.9 (L) 12.0 - 15.9 g/dL    Hematocrit 36.2 34.0 - 46.6 %    MCV 84.5 79.0 - 97.0 fL    MCH 27.9 26.6 - 33.0 pg    MCHC 33.0 31.5 - 35.7 g/dL    RDW 13.7 12.3 - 15.4 %    RDW-SD 40.3 37.0 - 54.0 fl    MPV 9.5 6.0 - 12.0 fL    Platelets 302 140 - 450 10*3/mm3    Neutrophil % 68.9 42.7 - 76.0 %    Lymphocyte % 21.0 19.6 - 45.3 %    Monocyte % 7.7 5.0 - 12.0 %    Eosinophil % 1.2 0.3 - 6.2 %    Basophil % 1.2 0.0 - 1.5 %    Neutrophils, Absolute 7.20 (H) 1.70 - 7.00 10*3/mm3    Lymphocytes, Absolute 2.20 0.70 - 3.10 10*3/mm3    Monocytes, Absolute 0.80 0.10 - 0.90 10*3/mm3    Eosinophils, Absolute 0.10 0.00 - 0.40 10*3/mm3    Basophils, Absolute 0.10 0.00 - 0.20  10*3/mm3    nRBC 0.1 0.0 - 0.2 /100 WBC   Urinalysis, Microscopic Only - Urine, Clean Catch    Specimen: Urine, Clean Catch   Result Value Ref Range    RBC, UA 0-2 (A) None Seen /HPF    WBC, UA 13-20 (A) None Seen /HPF    Bacteria, UA None Seen None Seen /HPF    Squamous Epithelial Cells, UA 3-6 (A) None Seen, 0-2 /HPF    Hyaline Casts, UA None Seen None Seen /LPF    Methodology Automated Microscopy    Troponin    Specimen: Blood   Result Value Ref Range    Troponin T <0.010 0.000 - 0.030 ng/mL   ECG 12 Lead   Result Value Ref Range    QT Interval 362 ms              CT Head Without Contrast    Result Date: 5/11/2022  Normal CT of the brain without contrast.  Electronically Signed By-Rupinder Mcmanus MD On:5/11/2022 12:00 PM This report was finalized on [id number] by  Rupinder Mcmanus MD.    MRI Brain Without Contrast    Result Date: 5/12/2022  No acute intracranial abnormality.  Electronically Signed By-Patrick Vidal MD On:5/12/2022 7:31 PM This report was finalized on [id number] by  Patrick Vidal MD.      EKG interpreted myself sinus rhythm rate of 86                                 MDM  Number of Diagnoses or Management Options  Diagnosis management comments: Definite cause for patient's symptoms have not been elicited here.  She has been describing these recurrent tremors and blackout spells.  She has a normal MRI.  Her EKG looks normal.  No arrhythmias.  Lab tests all look normal as well.  She describes these events like she is awake when it is happening.  She gets shaky.  It seems to be elicited with stress.  However could be cardiogenic or neurogenic.  For this reason I did discuss with the patient admission to the hospital.  [place] that further testing is warranted as she is now this having her second visit.  She however declines admission to the hospital.  She understands the risks up to and including death.  But she does not want to stay in the hospital.  I was discussed that she can return at any point if  she changes her mind.  She verbalizes understanding.       Amount and/or Complexity of Data Reviewed  Clinical lab tests: reviewed  Tests in the radiology section of CPT®: reviewed  Tests in the medicine section of CPT®: reviewed  Decide to obtain previous medical records or to obtain history from someone other than the patient: yes  Independent visualization of images, tracings, or specimens: yes    Patient Progress  Patient progress: other (comment)      Final diagnoses:   None   Tremors  Blackout spell    ED Disposition  ED Disposition     None          No follow-up provider specified.       Medication List      No changes were made to your prescriptions during this visit.          Isaias Blackmon DO  05/12/22 3346

## 2022-05-13 NOTE — DISCHARGE INSTRUCTIONS
It was discussed with you that I would like to admit you to the hospital.  You have declined at this point time.  Please understand that I cannot rule out neurologic or cardiac/heart emergencies.  The admission was for further monitoring and specialty consultation for these possibilities.  Please return to the emergency department if you have any desire for reevaluation.  If you have any worsening symptoms signs or concerns.

## 2022-05-15 LAB — QT INTERVAL: 362 MS

## 2022-05-19 ENCOUNTER — OFFICE VISIT (OUTPATIENT)
Dept: FAMILY MEDICINE CLINIC | Facility: CLINIC | Age: 22
End: 2022-05-19

## 2022-05-19 VITALS
HEART RATE: 95 BPM | BODY MASS INDEX: 41.52 KG/M2 | HEIGHT: 68 IN | DIASTOLIC BLOOD PRESSURE: 80 MMHG | WEIGHT: 274 LBS | SYSTOLIC BLOOD PRESSURE: 138 MMHG | OXYGEN SATURATION: 99 %

## 2022-05-19 DIAGNOSIS — R00.2 PALPITATIONS: ICD-10-CM

## 2022-05-19 DIAGNOSIS — F41.9 ANXIETY: ICD-10-CM

## 2022-05-19 DIAGNOSIS — R73.9 HYPERGLYCEMIA: Primary | ICD-10-CM

## 2022-05-19 DIAGNOSIS — R55 SYNCOPE, UNSPECIFIED SYNCOPE TYPE: ICD-10-CM

## 2022-05-19 PROCEDURE — 99204 OFFICE O/P NEW MOD 45 MIN: CPT | Performed by: NURSE PRACTITIONER

## 2022-05-19 PROCEDURE — 83036 HEMOGLOBIN GLYCOSYLATED A1C: CPT | Performed by: NURSE PRACTITIONER

## 2022-05-19 RX ORDER — FLUOXETINE 10 MG/1
10 CAPSULE ORAL DAILY
Qty: 30 CAPSULE | Refills: 1 | Status: SHIPPED | OUTPATIENT
Start: 2022-05-19 | End: 2022-06-16 | Stop reason: SDUPTHER

## 2022-05-19 NOTE — PROGRESS NOTES
Chief Complaint  Establish Care (/), Dizziness, Syncope (Blacking out ), Tremors, and Hypertension    Subjective          Xena Caicedo presents to [facility]  History of Present Illness    Patient presents to establish care. She is c/o blackout episodes, reports it has occurred at least five times. Patient becomes dizzy, sees black spots and then passes out. She is also having tremors and blurred vision. Patient has intermittent palpitations without chest pain or dyspnea. Symptoms started approximately two weeks ago.Patient was seen in ER on 5/11 and 5/12.  MRI brain was normal.  Labs have been otherwise unremarkable.  Patient has occasional nausea without vomiting. She denies constipation or diarrhea.  Patient does admit to anxiety, unsure if this is the cause of her episode.  Patient denies any drug or alcohol use.  Drug screen on 5/12 positive for marijuana.    PHQ-9 Depression Screening  Little interest or pleasure in doing things? 0-->not at all   Feeling down, depressed, or hopeless? 0-->not at all   Trouble falling or staying asleep, or sleeping too much?     Feeling tired or having little energy?     Poor appetite or overeating?     Feeling bad about yourself - or that you are a failure or have let yourself or your family down?     Trouble concentrating on things, such as reading the newspaper or watching television?     Moving or speaking so slowly that other people could have noticed? Or the opposite - being so fidgety or restless that you have been moving around a lot more than usual?     Thoughts that you would be better off dead, or of hurting yourself in some way?     PHQ-9 Total Score 0   If you checked off any problems, how difficult have these problems made it for you to do your work, take care of things at home, or get along with other people?           Objective   Vital Signs:  /80 (BP Location: Left arm, Patient Position: Sitting, Cuff Size: Large Adult)    "Pulse 95   Ht 172.7 cm (68\")   Wt 124 kg (274 lb)   SpO2 99%   BMI 41.66 kg/m²         Physical Exam  Constitutional:       Appearance: Normal appearance. She is obese.   HENT:      Head: Normocephalic.   Cardiovascular:      Rate and Rhythm: Normal rate and regular rhythm.   Pulmonary:      Effort: Pulmonary effort is normal.      Breath sounds: Normal breath sounds.   Abdominal:      General: Abdomen is flat. Bowel sounds are normal.      Palpations: Abdomen is soft.   Musculoskeletal:         General: Normal range of motion.      Cervical back: Neck supple.      Right lower leg: No edema.      Left lower leg: No edema.   Skin:     General: Skin is warm and dry.   Neurological:      Mental Status: She is alert and oriented to person, place, and time.      Gait: Gait is intact.   Psychiatric:         Attention and Perception: Attention normal.         Mood and Affect: Mood normal.         Speech: Speech normal.        Result Review :     CMP    CMP 5/11/22 5/12/22   Glucose 133 (A) 100 (A)   BUN 8 6   Creatinine 0.65 0.76   Sodium 139 139   Potassium 4.2 4.1   Chloride 104 102   Calcium 9.3 9.1   Albumin 4.30 4.10   Total Bilirubin 0.3 0.3   Alkaline Phosphatase 87 84   AST (SGOT) 15 14   ALT (SGPT) 13 12   (A) Abnormal value            CBC    CBC 5/11/22 5/12/22   WBC 8.00 10.50   RBC 4.40 4.28   Hemoglobin 12.3 11.9 (A)   Hematocrit 36.7 36.2   MCV 83.4 84.5   MCH 28.1 27.9   MCHC 33.7 33.0   RDW 14.0 13.7   Platelets 291 302   (A) Abnormal value                TSH    TSH 5/11/22   TSH 1.900                     Assessment and Plan    Diagnoses and all orders for this visit:    1. Hyperglycemia (Primary)  Assessment & Plan:  1.  Check A1c    Orders:  -     Cancel: Hemoglobin A1c  -     Hemoglobin A1c    2. Syncope, unspecified syncope type  Assessment & Plan:  1.  Reviewed labs and MRI  2.  Reviewed ER notes  3.  Patient scheduled to see neurology, keep appointment    Orders:  -     Holter Monitor - 48 Hour; " Future    3. Palpitations  Assessment & Plan:  1.  Order Holter monitor  2.  EKG reviewed  3.  Patient due to see cardiology in June    Orders:  -     Holter Monitor - 48 Hour; Future    4. Anxiety  Assessment & Plan:  1.  Start Prozac 10 mg daily  2.  Discussed possible side effects of SSRIs with patient  3.  Call with increasing depression or anxiety  4.  Follow-up in 4 weeks      Other orders  -     FLUoxetine (PROzac) 10 MG capsule; Take 1 capsule by mouth Daily.  Dispense: 30 capsule; Refill: 1         I spent 40 minutes caring for Xena on this date of service. This time includes time spent by me in the following activities:preparing for the visit, reviewing tests, obtaining and/or reviewing a separately obtained history, performing a medically appropriate examination and/or evaluation , counseling and educating the patient/family/caregiver, ordering medications, tests, or procedures, documenting information in the medical record, independently interpreting results and communicating that information with the patient/family/caregiver and care coordination  Follow Up   Return in about 4 weeks (around 6/16/2022).  Patient was given instructions and counseling regarding her condition or for health maintenance advice. Please see specific information pulled into the AVS if appropriate.

## 2022-05-20 PROBLEM — R73.9 HYPERGLYCEMIA: Status: ACTIVE | Noted: 2022-05-20

## 2022-05-20 PROBLEM — R00.2 PALPITATIONS: Status: ACTIVE | Noted: 2022-05-20

## 2022-05-20 PROBLEM — R55 SYNCOPE: Status: ACTIVE | Noted: 2022-05-20

## 2022-05-20 PROBLEM — F41.9 ANXIETY: Status: ACTIVE | Noted: 2022-05-20

## 2022-05-20 LAB — HBA1C MFR BLD: 5.1 % (ref 4.8–5.6)

## 2022-05-20 NOTE — ASSESSMENT & PLAN NOTE
1.  Reviewed labs and MRI  2.  Reviewed ER notes  3.  Patient scheduled to see neurology, keep appointment

## 2022-05-20 NOTE — ASSESSMENT & PLAN NOTE
1.  Start Prozac 10 mg daily  2.  Discussed possible side effects of SSRIs with patient  3.  Call with increasing depression or anxiety  4.  Follow-up in 4 weeks

## 2022-05-23 ENCOUNTER — TELEPHONE (OUTPATIENT)
Dept: FAMILY MEDICINE CLINIC | Facility: CLINIC | Age: 22
End: 2022-05-23

## 2022-05-23 NOTE — TELEPHONE ENCOUNTER
PATIENT CALLED AND STATES SHE HAS BEEN APPROVED FOR A SERVICE ANIMAL FOR HER ANXIETY. HER  WILL BE FAXING THE OFFICE PAPERWORK TO BE FILLED OUT.    CALL BACK NUMBER [phone]  SHE IS REQUESTING A CALL WHEN PAPERWORK IS RECEIVED     FYI TO BE AWARE OF THIS.

## 2022-05-23 NOTE — TELEPHONE ENCOUNTER
Attempted to contact patient at 14:42  today when receiving completed paperwork. No answer; no VM set up. Attempted to contact again at 15:11; again no answer; no VM set up.     OKAY FOR HUB TO READ

## 2022-05-25 ENCOUNTER — HOSPITAL ENCOUNTER (OUTPATIENT)
Dept: RESPIRATORY THERAPY | Facility: HOSPITAL | Age: 22
Discharge: HOME | End: 2022-05-25
Admitting: NURSE PRACTITIONER

## 2022-05-25 ENCOUNTER — TELEPHONE (OUTPATIENT)
Dept: FAMILY MEDICINE CLINIC | Facility: CLINIC | Age: 22
End: 2022-05-25

## 2022-05-25 DIAGNOSIS — R55 SYNCOPE, UNSPECIFIED SYNCOPE TYPE: ICD-10-CM

## 2022-05-25 DIAGNOSIS — R00.2 PALPITATIONS: ICD-10-CM

## 2022-05-25 PROCEDURE — 93225 XTRNL ECG REC<48 HRS REC: CPT

## 2022-05-25 PROCEDURE — 93242 EXT ECG>48HR<7D RECORDING: CPT

## 2022-05-25 NOTE — TELEPHONE ENCOUNTER
Caller: Xena Caicedo    Relationship: Self    Best call back number:  [phone]    What form or medical record are you requesting: PAPERWORK  THAT WAS FILLED OUT FOR HER DOG TO BE AT HER APPOINTMENT .     Who is requesting this form or medical record from you: PATIENT     How would you like to receive the form or medical records (pick-up, mail, fax): E-MAIL    [email or web address]  - SHOULD BE ON PAPERS THAT WERE FILLED OUT .       Timeframe paperwork needed: ASAP     Additional notes:  PATIENT STATED  LOCATION WONT TAKE PAPER COPIES REQUESTING EMAIL ONLY

## 2022-05-25 NOTE — TELEPHONE ENCOUNTER
Tried calling patient to let her know we do not have the original paperwork. If she can drop it back off for us to scan into her chart we can send it to the location it needs to go.

## 2022-06-01 PROBLEM — R19.7 DIARRHEA: Status: ACTIVE | Noted: 2017-11-13

## 2022-06-01 PROBLEM — Z30.8 ENCOUNTER FOR OTHER CONTRACEPTIVE MANAGEMENT: Status: ACTIVE | Noted: 2017-12-06

## 2022-06-01 PROBLEM — R74.8 ELEVATED LIVER ENZYMES: Status: ACTIVE | Noted: 2017-11-15

## 2022-06-01 NOTE — PROGRESS NOTES
Date of Office Visit: 2022  Encounter Provider: Dr. Chang Sherman  Place of Service: [facility] CARDIOLOGY [place]  Patient Name: Xena Caicedo  :2000  Katie Espino APRN    Chief Complaint   Patient presents with   • Palpitations   • Consult     History of Present Illness:    I am pleased to see Mrs. Caicedo in my office today as a new consultation.    As you know, patient is 21 years old white female whose past medical history is insignificant for any medical illness except for anxiety disorder, obesity, who is referred to me for symptom of syncope.    In May 2022, patient started having episodes of syncope.  She described this as a blackout.  Last episode was 2 days ago.  Patient was standing for the bus with her son.  He started having dizziness.  Patient have a blurring of vision and blackout and fell down on the ground but she was assisted by other people and was able to walk to the bedroom.  Patient did not have any prodromal symptom except for dizziness or lightheadedness.  She occasionally complain of palpitation.  Patient has few chest pains.  Patient denies any orthopnea PND patient has exertional shortness of breath.  Patient had 4-5 episodes in last 1 month.    In May 25, 2022, Holter monitor showed predominantly sinus rhythm with isolated PACs.  No SVT or VT was noted.  No significant pause of bradycardia was noted.  Patient had short run of atrial tachycardia for 5 beats.    Patient does not have previous history of diabetes mellitus, CAD, PCI or congenital heart disease.  Patient vape.  Patient does not use alcohol.    At this stage, I would recommend to proceed with stress test with Cardiolite imaging.  Echocardiogram would be done.  Patient would need tilt table test.  Patient may need EEG and neurology consultation.  Patient is being referred to neurologist by PCP        Past Medical History:   Diagnosis Date   • Anxiety    • Palpitations          History reviewed. No  "pertinent surgical history.        Current Outpatient Medications:   •  FLUoxetine (PROzac) 10 MG capsule, Take 1 capsule by mouth Daily., Disp: 30 capsule, Rfl: 1  •  hydrOXYzine (ATARAX) 25 MG tablet, Take 1 tablet by mouth Every 6 (Six) Hours As Needed for Itching or Anxiety., Disp: 25 tablet, Rfl: 0      Social History     Socioeconomic History   • Marital status: Single   Tobacco Use   • Smoking status: Never Smoker   • Smokeless tobacco: Never Used   Vaping Use   • Vaping Use: Every day   • Substances: Nicotine   • Devices: [employer] tank   • Passive vaping exposure: Yes   Substance and Sexual Activity   • Alcohol use: Not Currently     Comment: rarely/occ   • Drug use: Never   • Sexual activity: Defer         Review of Systems   Constitutional: Negative for chills and fever.   HENT: Negative for ear discharge and nosebleeds.    Eyes: Negative for discharge and redness.   Cardiovascular: Positive for near-syncope and syncope. Negative for chest pain, orthopnea, palpitations and paroxysmal nocturnal dyspnea.   Respiratory: Negative for cough, shortness of breath and wheezing.    Endocrine: Negative for heat intolerance.   Skin: Negative for rash.   Musculoskeletal: Negative for arthritis and myalgias.   Gastrointestinal: Negative for abdominal pain, melena, nausea and vomiting.   Genitourinary: Negative for dysuria and hematuria.   Neurological: Positive for dizziness. Negative for light-headedness, numbness and tremors.   Psychiatric/Behavioral: Negative for depression. The patient is nervous/anxious.        Procedures    Procedures    No orders to display           Objective:    /82 (BP Location: Left arm, Patient Position: Sitting, Cuff Size: Large Adult)   Pulse 95   Ht 172.7 cm (67.99\")   Wt 123 kg (272 lb)   LMP 05/04/2022   SpO2 100%   BMI 41.37 kg/m²         Constitutional:       Appearance: Well-developed.   Eyes:      General: No scleral icterus.        Right eye: No discharge.   HENT:    "   Head: Normocephalic and atraumatic.   Neck:      Thyroid: No thyromegaly.      Lymphadenopathy: No cervical adenopathy.   Pulmonary:      Effort: Pulmonary effort is normal. No respiratory distress.      Breath sounds: Normal breath sounds. No wheezing. No rales.   Cardiovascular:      Normal rate. Regular rhythm.      No gallop.   Abdominal:      Tenderness: There is no abdominal tenderness.   Skin:     Findings: No erythema or rash.   Neurological:      Mental Status: Alert and oriented to person, place, and time.             Assessment:       Diagnosis Plan   1. Palpitations     2. Syncope and collapse              Plan:       MDM:    1.  Syncope:    I would recommend to proceed with tilt table test, stress test and echocardiogram.  Patient may need event monitor in future.  I recommend neurology consultation for possible EGD    2.  Palpitation:    Holter monitor showed sinus rhythm.  Isolated PACs noted.  Short run of atrial tachycardia present.  Recommend observation.  Patient may need beta-blocker in future

## 2022-06-02 ENCOUNTER — OFFICE VISIT (OUTPATIENT)
Dept: CARDIOLOGY | Facility: CLINIC | Age: 22
End: 2022-06-02

## 2022-06-02 VITALS
HEIGHT: 68 IN | SYSTOLIC BLOOD PRESSURE: 130 MMHG | OXYGEN SATURATION: 100 % | DIASTOLIC BLOOD PRESSURE: 82 MMHG | BODY MASS INDEX: 41.22 KG/M2 | WEIGHT: 272 LBS | HEART RATE: 95 BPM

## 2022-06-02 DIAGNOSIS — R55 SYNCOPE AND COLLAPSE: ICD-10-CM

## 2022-06-02 DIAGNOSIS — R00.2 PALPITATIONS: Primary | ICD-10-CM

## 2022-06-02 PROCEDURE — 99204 OFFICE O/P NEW MOD 45 MIN: CPT | Performed by: INTERNAL MEDICINE

## 2022-06-09 PROCEDURE — 93244 EXT ECG>48HR<7D REV&INTERPJ: CPT | Performed by: INTERNAL MEDICINE

## 2022-06-13 ENCOUNTER — DOCUMENTATION (OUTPATIENT)
Dept: PHYSICAL THERAPY | Facility: CLINIC | Age: 22
End: 2022-06-13

## 2022-06-13 DIAGNOSIS — R55 SYNCOPE, UNSPECIFIED SYNCOPE TYPE: Primary | ICD-10-CM

## 2022-06-13 NOTE — PROGRESS NOTES
Discharge Summary  Discharge Summary from Physical Therapy Report        Number of Visits: 12     Goals: Partially Met    Discharge Plan: Patient to return to referring/providing physician    Date of Discharge 6/13/22        Romel Mayes PT  Physical Therapist

## 2022-06-14 ENCOUNTER — TRANSCRIBE ORDERS (OUTPATIENT)
Dept: ADMINISTRATIVE | Facility: HOSPITAL | Age: 22
End: 2022-06-14

## 2022-06-14 ENCOUNTER — LAB (OUTPATIENT)
Dept: LAB | Facility: HOSPITAL | Age: 22
End: 2022-06-14

## 2022-06-14 ENCOUNTER — HOSPITAL ENCOUNTER (OUTPATIENT)
Dept: CARDIOLOGY | Facility: HOSPITAL | Age: 22
Discharge: HOME | End: 2022-06-14

## 2022-06-14 VITALS
DIASTOLIC BLOOD PRESSURE: 76 MMHG | TEMPERATURE: 98.5 F | SYSTOLIC BLOOD PRESSURE: 129 MMHG | OXYGEN SATURATION: 100 % | RESPIRATION RATE: 20 BRPM | HEIGHT: 69 IN | WEIGHT: 286.6 LBS | BODY MASS INDEX: 42.45 KG/M2 | HEART RATE: 85 BPM

## 2022-06-14 DIAGNOSIS — Z01.818 PRE-OP EXAMINATION: ICD-10-CM

## 2022-06-14 DIAGNOSIS — R55 SYNCOPE, UNSPECIFIED SYNCOPE TYPE: ICD-10-CM

## 2022-06-14 DIAGNOSIS — Z01.818 PRE-OP EXAMINATION: Primary | ICD-10-CM

## 2022-06-14 LAB — SARS-COV-2 RNA PNL SPEC NAA+PROBE: NOT DETECTED

## 2022-06-14 PROCEDURE — 93660 TILT TABLE EVALUATION: CPT

## 2022-06-14 PROCEDURE — 87635 SARS-COV-2 COVID-19 AMP PRB: CPT | Performed by: INTERNAL MEDICINE

## 2022-06-14 PROCEDURE — C9803 HOPD COVID-19 SPEC COLLECT: HCPCS

## 2022-06-14 PROCEDURE — 93660 TILT TABLE EVALUATION: CPT | Performed by: INTERNAL MEDICINE

## 2022-06-14 RX ORDER — IBUPROFEN 200 MG
200 TABLET ORAL EVERY 6 HOURS PRN
COMMUNITY

## 2022-06-14 RX ORDER — NITROGLYCERIN 400 UG/1
1 SPRAY ORAL ONCE
Status: COMPLETED | OUTPATIENT
Start: 2022-06-14 | End: 2022-06-14

## 2022-06-14 RX ADMIN — NITROGLYCERIN 1 SPRAY: 400 SPRAY ORAL at 10:09

## 2022-06-14 NOTE — NURSING NOTE
Notified Dr. Sherman of patient's tilt table results. Ok to discharge patient home, patient to follow up with Dr. Sherman.

## 2022-06-16 ENCOUNTER — OFFICE VISIT (OUTPATIENT)
Dept: FAMILY MEDICINE CLINIC | Facility: CLINIC | Age: 22
End: 2022-06-16

## 2022-06-16 VITALS
SYSTOLIC BLOOD PRESSURE: 128 MMHG | HEIGHT: 69 IN | OXYGEN SATURATION: 98 % | DIASTOLIC BLOOD PRESSURE: 70 MMHG | HEART RATE: 97 BPM | WEIGHT: 273 LBS | BODY MASS INDEX: 40.43 KG/M2

## 2022-06-16 DIAGNOSIS — R55 SYNCOPE, UNSPECIFIED SYNCOPE TYPE: ICD-10-CM

## 2022-06-16 DIAGNOSIS — F41.9 ANXIETY: Primary | ICD-10-CM

## 2022-06-16 LAB
BH CV TILT AGENT USED: NORMAL
MAXIMAL PREDICTED HEART RATE: 197 BPM
STRESS TARGET HR: 167 BPM

## 2022-06-16 PROCEDURE — 99213 OFFICE O/P EST LOW 20 MIN: CPT | Performed by: NURSE PRACTITIONER

## 2022-06-16 RX ORDER — FLUOXETINE HYDROCHLORIDE 20 MG/1
20 CAPSULE ORAL DAILY
Qty: 30 CAPSULE | Refills: 1 | Status: SHIPPED | OUTPATIENT
Start: 2022-06-16 | End: 2022-07-28

## 2022-06-16 RX ORDER — FLUOXETINE HYDROCHLORIDE 20 MG/1
20 CAPSULE ORAL DAILY
Qty: 30 CAPSULE | Refills: 1 | Status: SHIPPED | OUTPATIENT
Start: 2022-06-16 | End: 2022-06-16

## 2022-06-16 NOTE — ASSESSMENT & PLAN NOTE
1.  Reviewed cardiology note  2.  Reviewed tilt table test  3.  Reviewed labs  4.  Proceed with stress test and echocardiogram as recommended per cardiology  5.  Keep appointment with neurology  6.  Follow-up in 6 weeks

## 2022-06-16 NOTE — PROGRESS NOTES
"Chief Complaint  Follow-up (4 week follow up anxiety/depression/also having a loss of appetite)    Subjective        Xena Caicdeo presents to [facility]  History of Present Illness    Patient presents for f/u visit regarding syncopal episode. She was seen on 5/19 with complaints of blackout episodes.  Patient seen by Cardiology. Tilt test was negative. Stress test and ECHO also recommended, not yet scheduled.  Patient will see Neurology on 7/7.  Labs unremarkable.  Patient reports she has had a few episodes where \"she goes out but not all the way out.\" Patient reports she can hear but cannot speak. Patient started on Prozac 10mg daily for anxiety, taking as prescribed. She reports minimal improvement in anxiety.     Objective   Vital Signs:  /70 (BP Location: Left arm, Patient Position: Sitting, Cuff Size: Large Adult)   Pulse 97   Ht 174 cm (68.5\")   Wt 124 kg (273 lb)   SpO2 98%   BMI 40.91 kg/m²   Estimated body mass index is 40.91 kg/m² as calculated from the following:    Height as of this encounter: 174 cm (68.5\").    Weight as of this encounter: 124 kg (273 lb).          Physical Exam  Constitutional:       Appearance: Normal appearance. She is obese.   HENT:      Head: Normocephalic.   Cardiovascular:      Rate and Rhythm: Normal rate and regular rhythm.   Pulmonary:      Effort: Pulmonary effort is normal.      Breath sounds: Normal breath sounds.   Abdominal:      General: Abdomen is flat. Bowel sounds are normal.      Palpations: Abdomen is soft.   Musculoskeletal:         General: Normal range of motion.      Cervical back: Neck supple.      Right lower leg: No edema.      Left lower leg: No edema.   Skin:     General: Skin is warm and dry.   Neurological:      Mental Status: She is alert and oriented to person, place, and time.      Gait: Gait is intact.   Psychiatric:         Attention and Perception: Attention normal.         Mood and Affect: Mood normal.    "      Speech: Speech normal.        Result Review :    CMP    CMP 5/11/22 5/12/22   Glucose 133 (A) 100 (A)   BUN 8 6   Creatinine 0.65 0.76   Sodium 139 139   Potassium 4.2 4.1   Chloride 104 102   Calcium 9.3 9.1   Albumin 4.30 4.10   Total Bilirubin 0.3 0.3   Alkaline Phosphatase 87 84   AST (SGOT) 15 14   ALT (SGPT) 13 12   (A) Abnormal value            CBC    CBC 5/11/22 5/12/22   WBC 8.00 10.50   RBC 4.40 4.28   Hemoglobin 12.3 11.9 (A)   Hematocrit 36.7 36.2   MCV 83.4 84.5   MCH 28.1 27.9   MCHC 33.7 33.0   RDW 14.0 13.7   Platelets 291 302   (A) Abnormal value                TSH    TSH 5/11/22   TSH 1.900           Most Recent A1C    HGBA1C Most Recent 5/19/22   Hemoglobin A1C 5.10           Data reviewed: Cardiology studies Tilt table test and Recent hospitalization notes Cardiology          Assessment and Plan   Diagnoses and all orders for this visit:    1. Anxiety (Primary)  Assessment & Plan:  1.  Increase Prozac to 20 mg daily  2.  Follow-up in 6 weeks      2. Syncope, unspecified syncope type  Assessment & Plan:  1.  Reviewed cardiology note  2.  Reviewed tilt table test  3.  Reviewed labs  4.  Proceed with stress test and echocardiogram as recommended per cardiology  5.  Keep appointment with neurology  6.  Follow-up in 6 weeks      Other orders  -     Discontinue: FLUoxetine (PROzac) 20 MG capsule; Take 1 capsule by mouth Daily.  Dispense: 30 capsule; Refill: 1  -     FLUoxetine (PROzac) 20 MG capsule; Take 1 capsule by mouth Daily.  Dispense: 30 capsule; Refill: 1         I spent 25 minutes caring for Xena on this date of service. This time includes time spent by me in the following activities:preparing for the visit, reviewing tests, obtaining and/or reviewing a separately obtained history, performing a medically appropriate examination and/or evaluation , counseling and educating the patient/family/caregiver, ordering medications, tests, or procedures, documenting information in the medical  record and care coordination  Follow Up   Return in about 6 weeks (around 7/28/2022) for Anxiety.  Patient was given instructions and counseling regarding her condition or for health maintenance advice. Please see specific information pulled into the AVS if appropriate.

## 2022-06-22 ENCOUNTER — TELEPHONE (OUTPATIENT)
Dept: FAMILY MEDICINE CLINIC | Facility: CLINIC | Age: 22
End: 2022-06-22

## 2022-06-22 NOTE — TELEPHONE ENCOUNTER
Caller: Xena Caicedo    Relationship: Self    Best call back number: [phone]     What is the best time to reach you: ANYTIME BETWEEN 1PM AND 2PM     Who are you requesting to speak with (clinical staff, provider,  specific staff member): MS. QUINTERO OR NURSE     Do you know the name of the person who called: UNKNOWN    What was the call regarding: PATIENT MISSED A CALL FROM THIS NUMBER BUT HUB WAS UNABLE TO FIGURE OUT WHO CALLED HER.    PLEASE CONTACT PATIENT IF NEEDED.    Do you require a callback: YES

## 2022-07-28 ENCOUNTER — TELEPHONE (OUTPATIENT)
Dept: FAMILY MEDICINE CLINIC | Facility: CLINIC | Age: 22
End: 2022-07-28

## 2022-07-28 ENCOUNTER — OFFICE VISIT (OUTPATIENT)
Dept: FAMILY MEDICINE CLINIC | Facility: CLINIC | Age: 22
End: 2022-07-28

## 2022-07-28 VITALS
HEIGHT: 69 IN | BODY MASS INDEX: 42.21 KG/M2 | WEIGHT: 285 LBS | SYSTOLIC BLOOD PRESSURE: 128 MMHG | DIASTOLIC BLOOD PRESSURE: 70 MMHG | HEART RATE: 82 BPM | OXYGEN SATURATION: 99 %

## 2022-07-28 DIAGNOSIS — R55 SYNCOPE, UNSPECIFIED SYNCOPE TYPE: ICD-10-CM

## 2022-07-28 DIAGNOSIS — R00.2 PALPITATIONS: ICD-10-CM

## 2022-07-28 DIAGNOSIS — F41.9 ANXIETY: Primary | ICD-10-CM

## 2022-07-28 PROCEDURE — 99213 OFFICE O/P EST LOW 20 MIN: CPT | Performed by: NURSE PRACTITIONER

## 2022-07-28 RX ORDER — BUSPIRONE HYDROCHLORIDE 7.5 MG/1
7.5 TABLET ORAL 2 TIMES DAILY
Qty: 60 TABLET | Refills: 1 | Status: SHIPPED | OUTPATIENT
Start: 2022-07-28 | End: 2022-10-27 | Stop reason: SDUPTHER

## 2022-07-28 RX ORDER — FLUOXETINE 10 MG/1
10 TABLET, FILM COATED ORAL DAILY
Qty: 14 TABLET | Refills: 0 | Status: SHIPPED | OUTPATIENT
Start: 2022-07-28 | End: 2022-10-27

## 2022-07-28 NOTE — PATIENT INSTRUCTIONS
Wean Prozac - take 1 tablet daily x 7 days then every other day x 7 days then stop  Start Buspar 7.5mg BID

## 2022-07-28 NOTE — PROGRESS NOTES
"Chief Complaint  Follow-up (6 week follow up anxiety/loss of appetite )    Subjective        Xena Caicedo presents to [facility]  History of Present Illness    Patient presents for follow-up visit.    Patient initially seen on 5/19 with complaints of blackout episodes.  She has been evaluated by cardiology.  Tilt table test was negative, stress test and echo recommended but not completed.  Patient was evaluated by neurology. Brain MRI negative. Patient advised that complaints were not neurological. She reports two episodes of dizziness since last visit, denies syncope. Patient denies palpitations, chest pain, dyspnea, edema, cough or dyspnea.     Patient started on Prozac for anxiety, increased to 20 mg daily at last visit. She feels that medication has decreased her appetite but still feels anxious.     Objective   Vital Signs:  /70 (BP Location: Left arm, Patient Position: Sitting, Cuff Size: Large Adult)   Pulse 82   Ht 174 cm (68.5\")   Wt 129 kg (285 lb)   SpO2 99%   BMI 42.70 kg/m²   Estimated body mass index is 42.7 kg/m² as calculated from the following:    Height as of this encounter: 174 cm (68.5\").    Weight as of this encounter: 129 kg (285 lb).          Physical Exam  Constitutional:       Appearance: Normal appearance. She is obese.   HENT:      Head: Normocephalic.   Cardiovascular:      Rate and Rhythm: Normal rate and regular rhythm.   Pulmonary:      Effort: Pulmonary effort is normal.      Breath sounds: Normal breath sounds.   Abdominal:      General: Abdomen is flat. Bowel sounds are normal.      Palpations: Abdomen is soft.   Musculoskeletal:         General: Normal range of motion.      Cervical back: Neck supple.      Right lower leg: No edema.      Left lower leg: No edema.   Skin:     General: Skin is warm and dry.   Neurological:      Mental Status: She is alert and oriented to person, place, and time.      Gait: Gait is intact.   Psychiatric:    "      Attention and Perception: Attention normal.         Mood and Affect: Mood normal.         Speech: Speech normal.        Result Review :    CMP    CMP 5/11/22 5/12/22   Glucose 133 (A) 100 (A)   BUN 8 6   Creatinine 0.65 0.76   Sodium 139 139   Potassium 4.2 4.1   Chloride 104 102   Calcium 9.3 9.1   Albumin 4.30 4.10   Total Bilirubin 0.3 0.3   Alkaline Phosphatase 87 84   AST (SGOT) 15 14   ALT (SGPT) 13 12   (A) Abnormal value            CBC    CBC 5/11/22 5/12/22   WBC 8.00 10.50   RBC 4.40 4.28   Hemoglobin 12.3 11.9 (A)   Hematocrit 36.7 36.2   MCV 83.4 84.5   MCH 28.1 27.9   MCHC 33.7 33.0   RDW 14.0 13.7   Platelets 291 302   (A) Abnormal value                TSH    TSH 5/11/22   TSH 1.900           Most Recent A1C    HGBA1C Most Recent 5/19/22   Hemoglobin A1C 5.10                     Assessment and Plan   Diagnoses and all orders for this visit:    1. Anxiety (Primary)  Assessment & Plan:  1. D/C Prozac, weaning schedule provided  2. Start Buspar 7.5mg BID  3. Refer to Psychiatry  4. Follow up 3 months    Orders:  -     Ambulatory Referral to Psychiatry    2. Syncope, unspecified syncope type  Assessment & Plan:  1. Continue with ECHO and Stress Test  2. Follow up with Cardiology      3. Palpitations  -     Ambulatory Referral to Psychiatry    Other orders  -     busPIRone (BUSPAR) 7.5 MG tablet; Take 1 tablet by mouth 2 (Two) Times a Day.  Dispense: 60 tablet; Refill: 1  -     FLUoxetine (PROzac) 10 MG tablet; Take 1 tablet by mouth Daily for 14 days. Weaning instructions provided  Dispense: 14 tablet; Refill: 0         I spent 25 minutes caring for Xena on this date of service. This time includes time spent by me in the following activities:preparing for the visit, reviewing tests, obtaining and/or reviewing a separately obtained history, performing a medically appropriate examination and/or evaluation , counseling and educating the patient/family/caregiver, ordering medications, tests, or  procedures, referring and communicating with other health care professionals , documenting information in the medical record and care coordination  Follow Up   Return in about 3 months (around 10/28/2022) for Anxiety.  Patient was given instructions and counseling regarding her condition or for health maintenance advice. Please see specific information pulled into the AVS if appropriate.

## 2022-07-28 NOTE — ASSESSMENT & PLAN NOTE
1. D/C Prozac, weaning schedule provided  2. Start Buspar 7.5mg BID  3. Refer to Psychiatry  4. Follow up 3 months

## 2022-07-28 NOTE — TELEPHONE ENCOUNTER
Caller: Xena Caicedo    Relationship: Self    Best call back number: [phone]     What is the best time to reach you: ANYTIME IN THE AFTERNOON     Who are you requesting to speak with (clinical staff, provider,  specific staff member): CLINICAL     What was the call regarding: PATIENT MISSED A CALL AND DOESN'T KNOW WHAT IT IS REGARDING.     PLEASE CALL PATIENT.

## 2022-07-29 NOTE — TELEPHONE ENCOUNTER
No one from the office contacted the patient yesterday. She has an appt and was seen in the office.

## 2022-08-02 NOTE — PROGRESS NOTES
Date of Office Visit: 2022  Encounter Provider: Dr. Chang Sherman  Place of Service: [facility] CARDIOLOGY [place]  Patient Name: Xena Caicedo  :2000  Katie Espino APRN    Chief Complaint   Patient presents with   • Palpitations   • Follow-up     History of Present Illness    I am pleased to see Mrs. Caicedo in my office today as a follow-up    As you know, patient is 21 years old white female whose past medical history is insignificant for any medical illness except for anxiety disorder, obesity, who came today for follow-up.    In May 2022, patient started having episodes of syncope.  She described this as a blackout.  Last episode was 2 days ago.  Patient was standing for the bus with her son.  He started having dizziness.  Patient have a blurring of vision and blackout and fell down on the ground but she was assisted by other people and was able to walk to the bedroom.  Patient did not have any prodromal symptom except for dizziness or lightheadedness.  She occasionally complain of palpitation.  Patient has few chest pains.  Patient denies any orthopnea PND patient has exertional shortness of breath.  Patient had 4-5 episodes in last 1 month.    In May 25, 2022, Holter monitor showed predominantly sinus rhythm with isolated PACs.  No SVT or VT was noted.  No significant pause of bradycardia was noted.  Patient had short run of atrial tachycardia for 5 beats.    Patient does not have previous history of diabetes mellitus, CAD, PCI or congenital heart disease.  Patient vape.  Patient does not use alcohol.    In 2022, patient underwent tilt table test and it was negative for hypotension or bradycardia.    Due to unclear reason, patient did not get echocardiogram and stress test.  Patient saw neurology and she was cleared by neurology.  However I am kind of surprised that no EEG was done.    At this stage I would recommend to proceed with stress test with Cardiolite imaging and  echocardiogram.  Patient is advised to call me if there is further episode of syncope.  Fall precautions are discussed.  Patient is educated about her symptoms.      Past Medical History:   Diagnosis Date   • Anxiety    • Palpitations          History reviewed. No pertinent surgical history.        Current Outpatient Medications:   •  busPIRone (BUSPAR) 7.5 MG tablet, Take 1 tablet by mouth 2 (Two) Times a Day., Disp: 60 tablet, Rfl: 1  •  FLUoxetine (PROzac) 10 MG tablet, Take 1 tablet by mouth Daily for 14 days. Weaning instructions provided, Disp: 14 tablet, Rfl: 0  •  hydrOXYzine (ATARAX) 25 MG tablet, Take 1 tablet by mouth Every 6 (Six) Hours As Needed for Itching or Anxiety., Disp: 25 tablet, Rfl: 0  •  ibuprofen (ADVIL,MOTRIN) 200 MG tablet, Take 200 mg by mouth Every 6 (Six) Hours As Needed for Mild Pain ., Disp: , Rfl:       Social History     Socioeconomic History   • Marital status: Single   Tobacco Use   • Smoking status: Never Smoker   • Smokeless tobacco: Current User   • Tobacco comment: Patient is advised to quit vaping   Vaping Use   • Vaping Use: Every day   • Substances: Nicotine   • Devices: Refillable tank   • Passive vaping exposure: Yes   Substance and Sexual Activity   • Alcohol use: Not Currently     Comment: rarely/occ   • Drug use: Never   • Sexual activity: Defer         Review of Systems   Constitutional: Negative for chills and fever.   HENT: Negative for ear discharge and nosebleeds.    Eyes: Negative for discharge and redness.   Cardiovascular: Positive for near-syncope. Negative for chest pain, orthopnea, palpitations, paroxysmal nocturnal dyspnea and syncope.   Respiratory: Negative for cough, shortness of breath and wheezing.    Endocrine: Negative for heat intolerance.   Skin: Negative for rash.   Musculoskeletal: Positive for arthritis. Negative for myalgias.   Gastrointestinal: Negative for abdominal pain, melena, nausea and vomiting.   Genitourinary: Negative for dysuria and  "hematuria.   Neurological: Negative for dizziness, light-headedness, numbness and tremors.   Psychiatric/Behavioral: Negative for depression. The patient is not nervous/anxious.        Procedures    Procedures    No orders to display           Objective:    /74 (BP Location: Left arm, Patient Position: Sitting, Cuff Size: Large Adult)   Pulse 87   Ht 174 cm (68.5\")   Wt 128 kg (283 lb)   SpO2 100%   BMI 42.40 kg/m²         Constitutional:       Appearance: Well-developed.   Eyes:      General: No scleral icterus.        Right eye: No discharge.   HENT:      Head: Normocephalic and atraumatic.   Neck:      Thyroid: No thyromegaly.      Lymphadenopathy: No cervical adenopathy.   Pulmonary:      Effort: Pulmonary effort is normal. No respiratory distress.      Breath sounds: Normal breath sounds. No wheezing. No rales.   Cardiovascular:      Normal rate. Regular rhythm.      No gallop.   Abdominal:      Tenderness: There is no abdominal tenderness.   Skin:     Findings: No erythema or rash.   Neurological:      Mental Status: Alert and oriented to person, place, and time.             Assessment:       Diagnosis Plan   1. Palpitations  Adult Transthoracic Echo Complete W/ Cont if Necessary Per Protocol    Stress Test With Myocardial Perfusion One Day   2. Syncope, unspecified syncope type  Adult Transthoracic Echo Complete W/ Cont if Necessary Per Protocol    Stress Test With Myocardial Perfusion One Day            Plan:       MDM:    1.  Syncope:    I would recommend to proceed with echocardiogram and stress test.    2.  Palpitation:    Patient has not had any further episodes of palpitation continue to observe  "

## 2022-08-03 ENCOUNTER — OFFICE VISIT (OUTPATIENT)
Dept: CARDIOLOGY | Facility: CLINIC | Age: 22
End: 2022-08-03

## 2022-08-03 VITALS
HEIGHT: 69 IN | SYSTOLIC BLOOD PRESSURE: 126 MMHG | DIASTOLIC BLOOD PRESSURE: 74 MMHG | OXYGEN SATURATION: 100 % | BODY MASS INDEX: 41.92 KG/M2 | HEART RATE: 87 BPM | WEIGHT: 283 LBS

## 2022-08-03 DIAGNOSIS — R55 SYNCOPE, UNSPECIFIED SYNCOPE TYPE: ICD-10-CM

## 2022-08-03 DIAGNOSIS — R00.2 PALPITATIONS: Primary | ICD-10-CM

## 2022-08-03 PROCEDURE — 99213 OFFICE O/P EST LOW 20 MIN: CPT | Performed by: INTERNAL MEDICINE

## 2022-08-19 NOTE — TELEPHONE ENCOUNTER
Caller: Xena Caicedo    Relationship: Self    Best call back number: [phone]     Requested Prescriptions:   Requested Prescriptions     Pending Prescriptions Disp Refills   • hydrOXYzine (ATARAX) 25 MG tablet 25 tablet 0     Sig: Take 1 tablet by mouth Every 6 (Six) Hours As Needed for Itching or Anxiety.        Pharm[facility] where request should be sent: [facility] [address] [place] [address], [place] [facility] [address] [facility] [phone]  - [phone] FX     Additional details provided by patient:     Does the patient have less than a 3 day supply:  [] Yes  [x] No    Karine Quintanilla Rep   08/19/22 12:41 EDT

## 2022-08-20 RX ORDER — HYDROXYZINE HYDROCHLORIDE 25 MG/1
25 TABLET, FILM COATED ORAL EVERY 6 HOURS PRN
Qty: 25 TABLET | Refills: 0 | Status: SHIPPED | OUTPATIENT
Start: 2022-08-20 | End: 2022-10-18 | Stop reason: SDUPTHER

## 2022-08-26 ENCOUNTER — HOSPITAL ENCOUNTER (OUTPATIENT)
Dept: NUCLEAR MEDICINE | Facility: HOSPITAL | Age: 22
Discharge: HOME | End: 2022-08-26

## 2022-08-26 ENCOUNTER — HOSPITAL ENCOUNTER (OUTPATIENT)
Dept: CARDIOLOGY | Facility: HOSPITAL | Age: 22
Discharge: HOME | End: 2022-08-26
Admitting: INTERNAL MEDICINE

## 2022-08-26 DIAGNOSIS — R55 SYNCOPE, UNSPECIFIED SYNCOPE TYPE: ICD-10-CM

## 2022-08-26 DIAGNOSIS — R00.2 PALPITATIONS: ICD-10-CM

## 2022-08-26 LAB
BH CV REST NUCLEAR ISOTOPE DOSE: 9.5 MCI
BH CV STRESS BP STAGE 1: NORMAL
BH CV STRESS BP STAGE 2: NORMAL
BH CV STRESS COMMENTS STAGE 1: NORMAL
BH CV STRESS COMMENTS STAGE 2: NORMAL
BH CV STRESS DOSE REGADENOSON STAGE 1: 0.4
BH CV STRESS DURATION MIN STAGE 1: 0
BH CV STRESS DURATION MIN STAGE 2: 4
BH CV STRESS DURATION SEC STAGE 1: 10
BH CV STRESS DURATION SEC STAGE 2: 0
BH CV STRESS HR STAGE 1: 121
BH CV STRESS HR STAGE 2: 85
BH CV STRESS NUCLEAR ISOTOPE DOSE: 32.2 MCI
BH CV STRESS PROTOCOL 1: NORMAL
BH CV STRESS RECOVERY BP: NORMAL MMHG
BH CV STRESS RECOVERY HR: 85 BPM
BH CV STRESS STAGE 1: 1
BH CV STRESS STAGE 2: 2
LV EF NUC BP: 62 %
MAXIMAL PREDICTED HEART RATE: 196 BPM
PERCENT MAX PREDICTED HR: 61.73 %
STRESS BASELINE BP: NORMAL MMHG
STRESS BASELINE HR: 77 BPM
STRESS PERCENT HR: 73 %
STRESS POST PEAK BP: NORMAL MMHG
STRESS POST PEAK HR: 121 BPM
STRESS TARGET HR: 167 BPM

## 2022-08-26 PROCEDURE — 78452 HT MUSCLE IMAGE SPECT MULT: CPT | Performed by: INTERNAL MEDICINE

## 2022-08-26 PROCEDURE — A9502 TC99M TETROFOSMIN: HCPCS | Performed by: INTERNAL MEDICINE

## 2022-08-26 PROCEDURE — 0 TECHNETIUM TETROFOSMIN KIT: Performed by: INTERNAL MEDICINE

## 2022-08-26 PROCEDURE — 93306 TTE W/DOPPLER COMPLETE: CPT

## 2022-08-26 PROCEDURE — 25010000002 REGADENOSON 0.4 MG/5ML SOLUTION: Performed by: INTERNAL MEDICINE

## 2022-08-26 PROCEDURE — 78452 HT MUSCLE IMAGE SPECT MULT: CPT

## 2022-08-26 PROCEDURE — 93017 CV STRESS TEST TRACING ONLY: CPT

## 2022-08-26 PROCEDURE — 93016 CV STRESS TEST SUPVJ ONLY: CPT | Performed by: INTERNAL MEDICINE

## 2022-08-26 PROCEDURE — 93306 TTE W/DOPPLER COMPLETE: CPT | Performed by: INTERNAL MEDICINE

## 2022-08-26 PROCEDURE — 93018 CV STRESS TEST I&R ONLY: CPT | Performed by: INTERNAL MEDICINE

## 2022-08-26 RX ADMIN — TETROFOSMIN 1 DOSE: 1.38 INJECTION, POWDER, LYOPHILIZED, FOR SOLUTION INTRAVENOUS at 14:52

## 2022-08-26 RX ADMIN — TETROFOSMIN 1 DOSE: 1.38 INJECTION, POWDER, LYOPHILIZED, FOR SOLUTION INTRAVENOUS at 13:55

## 2022-08-26 RX ADMIN — REGADENOSON 0.4 MG: 0.08 INJECTION, SOLUTION INTRAVENOUS at 14:51

## 2022-08-27 LAB
BH CV ECHO MEAS - ACS: 2.28 CM
BH CV ECHO MEAS - AO MAX PG: 3.5 MMHG
BH CV ECHO MEAS - AO MEAN PG: 2 MMHG
BH CV ECHO MEAS - AO ROOT DIAM: 3.4 CM
BH CV ECHO MEAS - AO V2 MAX: 93.7 CM/SEC
BH CV ECHO MEAS - AO V2 VTI: 21.7 CM
BH CV ECHO MEAS - AVA(I,D): 3.4 CM2
BH CV ECHO MEAS - EDV(CUBED): 69.5 ML
BH CV ECHO MEAS - EDV(MOD-SP4): 104.6 ML
BH CV ECHO MEAS - EF(MOD-BP): 61 %
BH CV ECHO MEAS - EF(MOD-SP4): 61 %
BH CV ECHO MEAS - ESV(CUBED): 27 ML
BH CV ECHO MEAS - ESV(MOD-SP4): 40.8 ML
BH CV ECHO MEAS - FS: 27.1 %
BH CV ECHO MEAS - IVS/LVPW: 1.01 CM
BH CV ECHO MEAS - IVSD: 0.87 CM
BH CV ECHO MEAS - LA DIMENSION: 3.6 CM
BH CV ECHO MEAS - LV DIASTOLIC VOL/BSA (35-75): 44.2 CM2
BH CV ECHO MEAS - LV MASS(C)D: 108.2 GRAMS
BH CV ECHO MEAS - LV MAX PG: 3.4 MMHG
BH CV ECHO MEAS - LV MEAN PG: 1.75 MMHG
BH CV ECHO MEAS - LV SYSTOLIC VOL/BSA (12-30): 17.2 CM2
BH CV ECHO MEAS - LV V1 MAX: 92.1 CM/SEC
BH CV ECHO MEAS - LV V1 VTI: 18.6 CM
BH CV ECHO MEAS - LVIDD: 4.1 CM
BH CV ECHO MEAS - LVIDS: 3 CM
BH CV ECHO MEAS - LVOT AREA: 4 CM2
BH CV ECHO MEAS - LVOT DIAM: 2.25 CM
BH CV ECHO MEAS - LVPWD: 0.86 CM
BH CV ECHO MEAS - MV A MAX VEL: 64.3 CM/SEC
BH CV ECHO MEAS - MV DEC SLOPE: 531.9 CM/SEC2
BH CV ECHO MEAS - MV DEC TIME: 0.21 MSEC
BH CV ECHO MEAS - MV E MAX VEL: 111.6 CM/SEC
BH CV ECHO MEAS - MV E/A: 1.73
BH CV ECHO MEAS - MV MAX PG: 4.1 MMHG
BH CV ECHO MEAS - MV MEAN PG: 1.5 MMHG
BH CV ECHO MEAS - MV V2 VTI: 24.6 CM
BH CV ECHO MEAS - MVA(VTI): 3 CM2
BH CV ECHO MEAS - PA ACC TIME: 0.15 SEC
BH CV ECHO MEAS - PA PR(ACCEL): 10.5 MMHG
BH CV ECHO MEAS - PA V2 MAX: 90.6 CM/SEC
BH CV ECHO MEAS - PULM A REVS DUR: 0.09 SEC
BH CV ECHO MEAS - PULM A REVS VEL: 21.7 CM/SEC
BH CV ECHO MEAS - PULM DIAS VEL: 51.4 CM/SEC
BH CV ECHO MEAS - PULM S/D: 0.81
BH CV ECHO MEAS - PULM SYS VEL: 41.5 CM/SEC
BH CV ECHO MEAS - RAP SYSTOLE: 8 MMHG
BH CV ECHO MEAS - RV MAX PG: 0.98 MMHG
BH CV ECHO MEAS - RV V1 MAX: 49.5 CM/SEC
BH CV ECHO MEAS - RV V1 VTI: 12.4 CM
BH CV ECHO MEAS - RVDD: 3.6 CM
BH CV ECHO MEAS - RVSP: 26.5 MMHG
BH CV ECHO MEAS - SI(MOD-SP4): 26.9 ML/M2
BH CV ECHO MEAS - SV(LVOT): 73.9 ML
BH CV ECHO MEAS - SV(MOD-SP4): 63.8 ML
BH CV ECHO MEAS - TR MAX PG: 18.5 MMHG
BH CV ECHO MEAS - TR MAX VEL: 215.3 CM/SEC
MAXIMAL PREDICTED HEART RATE: 196 BPM
STRESS TARGET HR: 167 BPM

## 2022-08-29 ENCOUNTER — TELEPHONE (OUTPATIENT)
Dept: CARDIOLOGY | Facility: CLINIC | Age: 22
End: 2022-08-29

## 2022-08-29 NOTE — TELEPHONE ENCOUNTER
Stress and echo looked ok keep follow up appt to discuss in detail      Hub can release this information

## 2022-10-18 RX ORDER — HYDROXYZINE HYDROCHLORIDE 25 MG/1
25 TABLET, FILM COATED ORAL EVERY 6 HOURS PRN
Qty: 25 TABLET | Refills: 0 | Status: SHIPPED | OUTPATIENT
Start: 2022-10-18 | End: 2022-10-27 | Stop reason: SDUPTHER

## 2022-10-18 NOTE — TELEPHONE ENCOUNTER
Caller: Xena Caicedo    Relationship: Self    Best call back number: [phone] (Mobile)    Requested Prescriptions:   Requested Prescriptions     Pending Prescriptions Disp Refills   • hydrOXYzine (ATARAX) 25 MG tablet 25 tablet 0     Sig: Take 1 tablet by mouth Every 6 (Six) Hours As Needed for Itching or Anxiety.        Pharmacy where request should be sent: [facility] [id number] [facility], [place] - [address] [facility] - [phone] [facility] [phone]      Additional details provided by patient: PATIENT IS COMPLETELY OUT OF MEDICATION AND NEEDS REFILLED ASAP    Does the patient have less than a 3 day supply:  [x] Yes  [] No    Karine Villeda Rep   10/18/22 15:54 EDT

## 2022-10-27 ENCOUNTER — OFFICE VISIT (OUTPATIENT)
Dept: FAMILY MEDICINE CLINIC | Facility: CLINIC | Age: 22
End: 2022-10-27

## 2022-10-27 VITALS
HEART RATE: 90 BPM | OXYGEN SATURATION: 99 % | WEIGHT: 293 LBS | HEIGHT: 69 IN | DIASTOLIC BLOOD PRESSURE: 80 MMHG | BODY MASS INDEX: 43.4 KG/M2 | SYSTOLIC BLOOD PRESSURE: 124 MMHG

## 2022-10-27 DIAGNOSIS — R55 SYNCOPE, UNSPECIFIED SYNCOPE TYPE: Primary | ICD-10-CM

## 2022-10-27 DIAGNOSIS — F41.9 ANXIETY: ICD-10-CM

## 2022-10-27 PROCEDURE — 99213 OFFICE O/P EST LOW 20 MIN: CPT | Performed by: NURSE PRACTITIONER

## 2022-10-27 RX ORDER — HYDROXYZINE HYDROCHLORIDE 25 MG/1
25 TABLET, FILM COATED ORAL EVERY 6 HOURS PRN
Qty: 90 TABLET | Refills: 0 | Status: SHIPPED | OUTPATIENT
Start: 2022-10-27 | End: 2022-11-26

## 2022-10-27 RX ORDER — BUSPIRONE HYDROCHLORIDE 7.5 MG/1
7.5 TABLET ORAL 2 TIMES DAILY
Qty: 60 TABLET | Refills: 1 | Status: SHIPPED | OUTPATIENT
Start: 2022-10-27 | End: 2022-10-27

## 2022-10-27 RX ORDER — BUSPIRONE HYDROCHLORIDE 10 MG/1
10 TABLET ORAL 2 TIMES DAILY
Qty: 60 TABLET | Refills: 1 | Status: SHIPPED | OUTPATIENT
Start: 2022-10-27 | End: 2022-12-07

## 2022-10-27 NOTE — ASSESSMENT & PLAN NOTE
1.  Increase BuSpar to 10 mg twice daily  2.  Hydroxyzine as needed  3.  Patient has tolerated Prozac poorly in the past, consider trying alternative

## 2022-10-27 NOTE — PROGRESS NOTES
"Chief Complaint  Follow-up (3 month follow up anxiety )    Subjective        Xena Caicedo presents to [facility]  History of Present Illness    Patient initially seen on 5/19 with complaints of blackout episodes.  She has been evaluated by cardiology.  Tilt table test was negative, stress test and echo completed.   Patient was evaluated by neurology. Brain MRI negative. Patient advised that complaints were not neurological. She reports three episodes in the last month with one full syncopal episode while in the shower. Patient reports occasional palpitations withoutchest pain, dyspnea, edema, cough or dyspnea. Patient is taking Buspar 7.5mg BID for anxiety. She reports improvement in anxiety but still has some breakthrough. Patient does feel that episodes are improving with less anxiety.     Objective   Vital Signs:  /80 (BP Location: Left arm, Patient Position: Sitting, Cuff Size: Large Adult)   Pulse 90   Ht 174 cm (68.5\")   Wt 134 kg (295 lb)   SpO2 99%   BMI 44.20 kg/m²   Estimated body mass index is 44.2 kg/m² as calculated from the following:    Height as of this encounter: 174 cm (68.5\").    Weight as of this encounter: 134 kg (295 lb).          Physical Exam  Constitutional:       Appearance: Normal appearance.   HENT:      Head: Normocephalic.   Cardiovascular:      Rate and Rhythm: Normal rate and regular rhythm.   Pulmonary:      Effort: Pulmonary effort is normal.      Breath sounds: Normal breath sounds.   Abdominal:      General: Abdomen is flat. Bowel sounds are normal.      Palpations: Abdomen is soft.   Musculoskeletal:         General: Normal range of motion.      Cervical back: Neck supple.      Right lower leg: No edema.      Left lower leg: No edema.   Skin:     General: Skin is warm and dry.   Neurological:      Mental Status: She is alert and oriented to person, place, and time.      Gait: Gait is intact.   Psychiatric:         Attention and " Perception: Attention normal.         Mood and Affect: Mood normal.         Speech: Speech normal.        Result Review :    CMP    CMP 5/11/22 5/12/22   Glucose 133 (A) 100 (A)   BUN 8 6   Creatinine 0.65 0.76   Sodium 139 139   Potassium 4.2 4.1   Chloride 104 102   Calcium 9.3 9.1   Albumin 4.30 4.10   Total Bilirubin 0.3 0.3   Alkaline Phosphatase 87 84   AST (SGOT) 15 14   ALT (SGPT) 13 12   (A) Abnormal value            CBC    CBC 5/11/22 5/12/22   WBC 8.00 10.50   RBC 4.40 4.28   Hemoglobin 12.3 11.9 (A)   Hematocrit 36.7 36.2   MCV 83.4 84.5   MCH 28.1 27.9   MCHC 33.7 33.0   RDW 14.0 13.7   Platelets 291 302   (A) Abnormal value                TSH    TSH 5/11/22   TSH 1.900           Most Recent A1C    HGBA1C Most Recent 5/19/22   Hemoglobin A1C 5.10           Data reviewed: Consultant notes Cardiology          Assessment and Plan   Diagnoses and all orders for this visit:    1. Syncope, unspecified syncope type (Primary)  Assessment & Plan:  1.  Patient to follow-up with cardiology  2.  Manage anxiety      2. Anxiety  Assessment & Plan:  1.  Increase BuSpar to 10 mg twice daily  2.  Hydroxyzine as needed  3.  Patient has tolerated Prozac poorly in the past, consider trying alternative      Other orders  -     Discontinue: busPIRone (BUSPAR) 7.5 MG tablet; Take 1 tablet by mouth 2 (Two) Times a Day.  Dispense: 60 tablet; Refill: 1  -     hydrOXYzine (ATARAX) 25 MG tablet; Take 1 tablet by mouth Every 6 (Six) Hours As Needed for Anxiety for up to 30 days.  Dispense: 90 tablet; Refill: 0  -     busPIRone (BUSPAR) 10 MG tablet; Take 1 tablet by mouth 2 (Two) Times a Day for 30 days.  Dispense: 60 tablet; Refill: 1         I spent 25 minutes caring for Xena on this date of service. This time includes time spent by me in the following activities:preparing for the visit, reviewing tests, obtaining and/or reviewing a separately obtained history, performing a medically appropriate examination and/or evaluation ,  counseling and educating the patient/family/caregiver, ordering medications, tests, or procedures, documenting information in the medical record, independently interpreting results and communicating that information with the patient/family/caregiver and care coordination  Follow Up   Return in about 3 months (around 1/27/2023) for Anxiety.  Patient was given instructions and counseling regarding her condition or for health maintenance advice. Please see specific information pulled into the AVS if appropriate.

## 2022-12-06 NOTE — PROGRESS NOTES
Date of Office Visit: 2022  Encounter Provider: Dr.Syed Sherman  Place of Service: [facility] CARDIOLOGY [place]  Patient Name: Xena Caicedo  :2000  Katie Espino APRN    Chief Complaint   Patient presents with   • Syncope   • Palpitations   • Follow-up     History of Present Illness    I am pleased to see Mrs. Caicedo in my office today as a follow-up    As you know, patient is 22 years old white female whose past medical history is insignificant for any medical illness except for anxiety disorder, obesity, who came today for follow-up.    In May 2022, patient started having episodes of syncope.  She described this as a blackout.  Last episode was 2 days ago.  Patient was standing for the bus with her son.  He started having dizziness.  Patient have a blurring of vision and blackout and fell down on the ground but she was assisted by other people and was able to walk to the bedroom.  Patient did not have any prodromal symptom except for dizziness or lightheadedness.  She occasionally complain of palpitation.  Patient has few chest pains.  Patient denies any orthopnea PND patient has exertional shortness of breath.  Patient had 4-5 episodes in last 1 month.    In May 25, 2022, Holter monitor showed predominantly sinus rhythm with isolated PACs.  No SVT or VT was noted.  No significant pause of bradycardia was noted.  Patient had short run of atrial tachycardia for 5 beats.    Patient does not have previous history of diabetes mellitus, CAD, PCI or congenital heart disease.  Patient vape.  Patient does not use alcohol.    In 2022, patient underwent tilt table test and it was negative for hypotension or bradycardia.  In 2022, patient underwent echocardiogram which showed EF of 60 to 65%.  No significant valvular heart disease noted.  Stress test showed no ischemia or myocardial infarction on Cardiolite imaging    Since last visit, patient is overall stable.  She has not had any  further episode of syncope or passing out.  She does get some shakes she believes that her blood sugar is low at that time and they get resolved with food and sugar products.  Patient symptom of therapy is improving with BuSpar.  At this stage no further cardiac work-up is needed.  If there is recurrence of symptoms, patient should be evaluated for endocrinological work-up as well as EEG        Past Medical History:   Diagnosis Date   • Anxiety    • Palpitations          History reviewed. No pertinent surgical history.        Current Outpatient Medications:   •  busPIRone (BUSPAR) 10 MG tablet, Take 10 mg by mouth 3 (Three) Times a Day., Disp: , Rfl:   •  hydrOXYzine (ATARAX) 25 MG tablet, Take 25 mg by mouth 3 (Three) Times a Day As Needed for Itching., Disp: , Rfl:   •  ibuprofen (ADVIL,MOTRIN) 200 MG tablet, Take 200 mg by mouth Every 6 (Six) Hours As Needed for Mild Pain ., Disp: , Rfl:       Social History     Socioeconomic History   • Marital status: Single   Tobacco Use   • Smoking status: Never   • Smokeless tobacco: Never   • Tobacco comments:     Patient is advised to quit vaping   Vaping Use   • Vaping Use: Every day   • Substances: Nicotine   • Devices: Refillable tank   • Passive vaping exposure: Yes   Substance and Sexual Activity   • Alcohol use: Not Currently     Comment: rarely/occ   • Drug use: Never   • Sexual activity: Defer         Review of Systems   Constitutional: Negative for chills and fever.   HENT: Negative for ear discharge and nosebleeds.    Eyes: Negative for discharge and redness.   Cardiovascular: Negative for chest pain, orthopnea, palpitations, paroxysmal nocturnal dyspnea and syncope.   Respiratory: Positive for shortness of breath. Negative for cough and wheezing.    Endocrine: Negative for heat intolerance.   Skin: Negative for rash.   Musculoskeletal: Negative for arthritis and myalgias.   Gastrointestinal: Negative for abdominal pain, melena, nausea and vomiting.  "  Genitourinary: Negative for dysuria and hematuria.   Neurological: Negative for dizziness, light-headedness, numbness and tremors.   Psychiatric/Behavioral: Negative for depression. The patient is not nervous/anxious.        Procedures    Procedures    No orders to display           Objective:    /82 (BP Location: Right arm, Patient Position: Sitting, Cuff Size: Large Adult)   Pulse 80   Ht 174 cm (68.5\")   Wt 135 kg (297 lb)   SpO2 98%   BMI 44.50 kg/m²         Constitutional:       Appearance: Well-developed.   Eyes:      General: No scleral icterus.        Right eye: No discharge.   HENT:      Head: Normocephalic and atraumatic.   Neck:      Thyroid: No thyromegaly.      Lymphadenopathy: No cervical adenopathy.   Pulmonary:      Effort: Pulmonary effort is normal. No respiratory distress.      Breath sounds: Normal breath sounds. No wheezing. No rales.   Cardiovascular:      Normal rate. Regular rhythm.      No gallop.   Abdominal:      Tenderness: There is no abdominal tenderness.   Skin:     Findings: No erythema or rash.   Neurological:      Mental Status: Alert and oriented to person, place, and time.             Assessment:       Diagnosis Plan   1. Palpitations        2. Near syncope                 Plan:       MDM:    1.  Syncope:    Patient had near syncopal episode.  However her tilt table test and echocardiogram and stress test is unremarkable.  At this stage recommend observation.    2.  Palpitation:    Patient is not having further episode of palpitation continue to observe    3.  Anxiety disorder:    Patient is on BuSpar  "

## 2022-12-07 ENCOUNTER — OFFICE VISIT (OUTPATIENT)
Dept: CARDIOLOGY | Facility: CLINIC | Age: 22
End: 2022-12-07

## 2022-12-07 VITALS
BODY MASS INDEX: 43.4 KG/M2 | WEIGHT: 293 LBS | SYSTOLIC BLOOD PRESSURE: 122 MMHG | OXYGEN SATURATION: 98 % | DIASTOLIC BLOOD PRESSURE: 82 MMHG | HEIGHT: 69 IN | HEART RATE: 80 BPM

## 2022-12-07 DIAGNOSIS — R00.2 PALPITATIONS: Primary | ICD-10-CM

## 2022-12-07 DIAGNOSIS — R55 NEAR SYNCOPE: ICD-10-CM

## 2022-12-07 PROCEDURE — 99213 OFFICE O/P EST LOW 20 MIN: CPT | Performed by: INTERNAL MEDICINE

## 2022-12-07 RX ORDER — BUSPIRONE HYDROCHLORIDE 10 MG/1
10 TABLET ORAL 3 TIMES DAILY
COMMUNITY
End: 2023-01-19 | Stop reason: SDUPTHER

## 2022-12-07 RX ORDER — HYDROXYZINE HYDROCHLORIDE 25 MG/1
25 TABLET, FILM COATED ORAL 3 TIMES DAILY PRN
COMMUNITY
End: 2023-01-19 | Stop reason: SDUPTHER

## 2022-12-14 ENCOUNTER — TELEPHONE (OUTPATIENT)
Dept: FAMILY MEDICINE CLINIC | Facility: CLINIC | Age: 22
End: 2022-12-14

## 2022-12-14 ENCOUNTER — OFFICE VISIT (OUTPATIENT)
Dept: FAMILY MEDICINE CLINIC | Facility: CLINIC | Age: 22
End: 2022-12-14

## 2022-12-14 VITALS
WEIGHT: 293 LBS | SYSTOLIC BLOOD PRESSURE: 112 MMHG | BODY MASS INDEX: 43.4 KG/M2 | OXYGEN SATURATION: 99 % | HEIGHT: 69 IN | DIASTOLIC BLOOD PRESSURE: 78 MMHG | HEART RATE: 87 BPM

## 2022-12-14 DIAGNOSIS — R00.2 PALPITATIONS: ICD-10-CM

## 2022-12-14 DIAGNOSIS — F41.9 ANXIETY: Primary | ICD-10-CM

## 2022-12-14 DIAGNOSIS — R42 DIZZINESS: ICD-10-CM

## 2022-12-14 PROCEDURE — 99213 OFFICE O/P EST LOW 20 MIN: CPT | Performed by: NURSE PRACTITIONER

## 2022-12-14 RX ORDER — BLOOD-GLUCOSE METER
1 KIT MISCELLANEOUS AS NEEDED
Qty: 1 EACH | Refills: 0 | Status: SHIPPED | OUTPATIENT
Start: 2022-12-14

## 2022-12-14 RX ORDER — LANCETS 28 GAUGE
EACH MISCELLANEOUS
Qty: 100 EACH | Refills: 11 | Status: SHIPPED | OUTPATIENT
Start: 2022-12-14

## 2022-12-14 NOTE — PROGRESS NOTES
"Chief Complaint  Hyperglycemia (Pt reports starting 2-3 weeks ago, she has had very low energy and lower blood sugar to the point of fainting. She reports having a hx of being hyperglycemic. )    Subjective        Xena Caicedo presents to [facility]  History of Present Illness    Patient presents for f/u visit.     Patient initially seen on 5/19 with complaints of blackout episodes and palpitations.  She has been evaluated by cardiology.  Tilt table test was negative, stress test and echo completed.   Patient was evaluated by neurology. Brain MRI negative. Patient advised that complaints were not neurological. Patient is taking Buspar 10mg BID for anxiety, reports anxiety fluctuates. She has SI with Prozac, concerned about trying alternative medication but is willing.  Patient denies any syncopal episodes since last visit but she does have frequent dizziness.  A1c was 5.4 but cardiology mentioned possible hypoglycemia.    Objective   Vital Signs:  /78 (BP Location: Left arm, Patient Position: Sitting, Cuff Size: Adult)   Pulse 87   Ht 174 cm (68.5\")   Wt (!) 136 kg (300 lb 6.4 oz)   SpO2 99%   BMI 45.01 kg/m²   Estimated body mass index is 45.01 kg/m² as calculated from the following:    Height as of this encounter: 174 cm (68.5\").    Weight as of this encounter: 136 kg (300 lb 6.4 oz).          Physical Exam  Constitutional:       Appearance: Normal appearance.   HENT:      Head: Normocephalic.   Cardiovascular:      Rate and Rhythm: Normal rate and regular rhythm.   Pulmonary:      Effort: Pulmonary effort is normal.      Breath sounds: Normal breath sounds.   Abdominal:      General: Abdomen is flat. Bowel sounds are normal.      Palpations: Abdomen is soft.   Musculoskeletal:         General: Normal range of motion.      Cervical back: Neck supple.      Right lower leg: No edema.      Left lower leg: No edema.   Skin:     General: Skin is warm and dry.   Neurological: "      Mental Status: She is alert and oriented to person, place, and time.      Gait: Gait is intact.   Psychiatric:         Attention and Perception: Attention normal.         Mood and Affect: Mood normal.         Speech: Speech normal.        Result Review :    CMP    CMP 5/11/22 5/12/22   Glucose 133 (A) 100 (A)   BUN 8 6   Creatinine 0.65 0.76   Sodium 139 139   Potassium 4.2 4.1   Chloride 104 102   Calcium 9.3 9.1   Albumin 4.30 4.10   Total Bilirubin 0.3 0.3   Alkaline Phosphatase 87 84   AST (SGOT) 15 14   ALT (SGPT) 13 12   (A) Abnormal value            CBC    CBC 5/11/22 5/12/22   WBC 8.00 10.50   RBC 4.40 4.28   Hemoglobin 12.3 11.9 (A)   Hematocrit 36.7 36.2   MCV 83.4 84.5   MCH 28.1 27.9   MCHC 33.7 33.0   RDW 14.0 13.7   Platelets 291 302   (A) Abnormal value                TSH    TSH 5/11/22   TSH 1.900           Most Recent A1C    HGBA1C Most Recent 5/19/22   Hemoglobin A1C 5.10           Data reviewed: Consultant notes Cardiology          Assessment and Plan   Diagnoses and all orders for this visit:    1. Anxiety (Primary)  Assessment & Plan:  1.  Adverse effects with Prozac  2.  Start Trintellix 5 mg daily  3.  Continue BuSpar as prescribed  4.  Follow-up in 4 weeks      2. Dizziness  Assessment & Plan:  1.  Glucometer ordered for patient to check glucose level as needed with dizziness      3. Palpitations  Assessment & Plan:  1.  Otherwise unremarkable cardiology work-up, likely related to anxiety      Other orders  -     glucose monitor monitoring kit; 1 each As Needed (hypoglycemia).  Dispense: 1 each; Refill: 0  -     glucose blood test strip; Use as instructed  Dispense: 50 each; Refill: 12  -     Lancets (freestyle) lancets; Use to check blood glucose twice daily as needed DX: E11.65  Dispense: 100 each; Refill: 11  -     Vortioxetine HBr (Trintellix) 5 MG tablet tablet; Take 1 tablet by mouth Daily With Breakfast.  Dispense: 30 tablet; Refill: 1         I spent 25 minutes caring for Xena  on this date of service. This time includes time spent by me in the following activities:preparing for the visit, reviewing tests, obtaining and/or reviewing a separately obtained history, performing a medically appropriate examination and/or evaluation , counseling and educating the patient/family/caregiver, ordering medications, tests, or procedures, documenting information in the medical record, independently interpreting results and communicating that information with the patient/family/caregiver and care coordination  Follow Up   Return in about 4 weeks (around 1/11/2023) for Anxiety.  Patient was given instructions and counseling regarding her condition or for health maintenance advice. Please see specific information pulled into the AVS if appropriate.

## 2022-12-14 NOTE — TELEPHONE ENCOUNTER
Pt has f/u appt we just scheduled for 1/11, pt also has 1/27 appt can that one be canceled? Please advise

## 2022-12-15 NOTE — ASSESSMENT & PLAN NOTE
Injection  Injection performed by Lanie Hughes MA. Patient tolerated the procedure well without complications.  03/25/22   Lanie Hughes MA     1.  Adverse effects with Prozac  2.  Start Trintellix 5 mg daily  3.  Continue BuSpar as prescribed  4.  Follow-up in 4 weeks

## 2023-01-11 ENCOUNTER — OFFICE VISIT (OUTPATIENT)
Dept: FAMILY MEDICINE CLINIC | Facility: CLINIC | Age: 23
End: 2023-01-11
Payer: MEDICAID

## 2023-01-11 VITALS
HEART RATE: 85 BPM | HEIGHT: 69 IN | DIASTOLIC BLOOD PRESSURE: 72 MMHG | SYSTOLIC BLOOD PRESSURE: 124 MMHG | OXYGEN SATURATION: 99 % | BODY MASS INDEX: 43.4 KG/M2 | WEIGHT: 293 LBS

## 2023-01-11 DIAGNOSIS — R42 DIZZINESS: ICD-10-CM

## 2023-01-11 DIAGNOSIS — F41.9 ANXIETY: Primary | ICD-10-CM

## 2023-01-11 PROCEDURE — 99213 OFFICE O/P EST LOW 20 MIN: CPT | Performed by: NURSE PRACTITIONER

## 2023-01-11 NOTE — ASSESSMENT & PLAN NOTE
1.  Improving  2.  Office demonstrated how to use glucometer for patient to use with dizziness or lightheadedness

## 2023-01-11 NOTE — PROGRESS NOTES
"Chief Complaint  Follow-up (4 week follow up anxiety )    Subjective        Xena Caicedo presents to [facility]  History of Present Illness    Patient presents for f/u visit.     Patient initially seen on 5/19 with complaints of blackout episodes and palpitations.  She has been evaluated by cardiology.  Tilt table test was negative, stress test and echo completed.   Patient was evaluated by neurology. Brain MRI negative. Patient advised that complaints were not neurological.  Patient is taking BuSpar 10 mg 3 times daily.  She tried and failed Prozac.  Patient then started on Trintellix 5 mg daily and is taking as prescribed.  She reports significant improvement in overall anxiety. Patient denies any syncopal episodes since last visit, dizziness seems to be improving.  A1c was 5.4 but cardiology mentioned possible hypoglycemia. Glucometer ordered, patient has been unable to check glucose.     Objective   Vital Signs:  /72 (BP Location: Left arm, Patient Position: Sitting, Cuff Size: Large Adult)   Pulse 85   Ht 174 cm (68.5\")   Wt (!) 139 kg (307 lb)   SpO2 99%   BMI 46.00 kg/m²   Estimated body mass index is 46 kg/m² as calculated from the following:    Height as of this encounter: 174 cm (68.5\").    Weight as of this encounter: 139 kg (307 lb).             Physical Exam  Constitutional:       Appearance: Normal appearance.   HENT:      Head: Normocephalic.   Cardiovascular:      Rate and Rhythm: Normal rate and regular rhythm.   Pulmonary:      Effort: Pulmonary effort is normal.      Breath sounds: Normal breath sounds.   Abdominal:      General: Abdomen is flat. Bowel sounds are normal.      Palpations: Abdomen is soft.   Musculoskeletal:         General: Normal range of motion.      Cervical back: Neck supple.      Right lower leg: No edema.      Left lower leg: No edema.   Skin:     General: Skin is warm and dry.   Neurological:      Mental Status: She is alert and " oriented to person, place, and time.      Gait: Gait is intact.   Psychiatric:         Attention and Perception: Attention normal.         Mood and Affect: Mood normal.         Speech: Speech normal.        Result Review :                   Assessment and Plan   Diagnoses and all orders for this visit:    1. Anxiety (Primary)  Assessment & Plan:  1.  Improving  2.  Increase Trintellix to 10 mg daily      2. Dizziness  Assessment & Plan:  1.  Improving  2.  Office demonstrated how to use glucometer for patient to use with dizziness or lightheadedness      Other orders  -     Vortioxetine HBr (Trintellix) 10 MG tablet tablet; Take 1 tablet by mouth Daily With Breakfast for 30 days.  Dispense: 30 tablet; Refill: 1         I spent 20 minutes caring for Xena on this date of service. This time includes time spent by me in the following activities:preparing for the visit, obtaining and/or reviewing a separately obtained history, performing a medically appropriate examination and/or evaluation , counseling and educating the patient/family/caregiver, ordering medications, tests, or procedures, documenting information in the medical record and care coordination  Follow Up   Return in about 3 months (around 4/11/2023) for Anxiety.  Patient was given instructions and counseling regarding her condition or for health maintenance advice. Please see specific information pulled into the AVS if appropriate.

## 2023-01-19 ENCOUNTER — PATIENT ROUNDING (BHMG ONLY) (OUTPATIENT)
Dept: PSYCHIATRY | Facility: CLINIC | Age: 23
End: 2023-01-19
Payer: MEDICAID

## 2023-01-19 ENCOUNTER — OFFICE VISIT (OUTPATIENT)
Dept: PSYCHIATRY | Facility: CLINIC | Age: 23
End: 2023-01-19
Payer: MEDICAID

## 2023-01-19 VITALS
OXYGEN SATURATION: 99 % | WEIGHT: 293 LBS | BODY MASS INDEX: 46 KG/M2 | HEART RATE: 90 BPM | DIASTOLIC BLOOD PRESSURE: 76 MMHG | SYSTOLIC BLOOD PRESSURE: 120 MMHG

## 2023-01-19 DIAGNOSIS — F31.81 BIPOLAR II DISORDER: Primary | Chronic | ICD-10-CM

## 2023-01-19 DIAGNOSIS — F41.1 GENERALIZED ANXIETY DISORDER: Chronic | ICD-10-CM

## 2023-01-19 DIAGNOSIS — F51.05 INSOMNIA DUE TO MENTAL CONDITION: Chronic | ICD-10-CM

## 2023-01-19 PROCEDURE — 90792 PSYCH DIAG EVAL W/MED SRVCS: CPT

## 2023-01-19 RX ORDER — HYDROXYZINE 50 MG/1
25 TABLET, FILM COATED ORAL 3 TIMES DAILY PRN
Qty: 30 TABLET | Refills: 1 | Status: SHIPPED | OUTPATIENT
Start: 2023-01-19

## 2023-01-19 RX ORDER — BUSPIRONE HYDROCHLORIDE 10 MG/1
15 TABLET ORAL 3 TIMES DAILY
Qty: 135 TABLET | Refills: 1 | Status: SHIPPED | OUTPATIENT
Start: 2023-01-19 | End: 2023-02-24

## 2023-01-19 NOTE — PROGRESS NOTES
A My-Chart message has been sent to the patient for [place] [facility]
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
## 2023-01-24 ENCOUNTER — PRIOR AUTHORIZATION (OUTPATIENT)
Dept: PSYCHIATRY | Facility: CLINIC | Age: 23
End: 2023-01-24
Payer: MEDICAID

## 2023-01-24 NOTE — TELEPHONE ENCOUNTER
Prior authorization for Buspirone HCI 10 mg tablets has been submitted to [facility] on 1-24-23.

## 2023-01-25 DIAGNOSIS — F41.1 GENERALIZED ANXIETY DISORDER: Primary | ICD-10-CM

## 2023-01-25 RX ORDER — BUSPIRONE HYDROCHLORIDE 15 MG/1
15 TABLET ORAL 3 TIMES DAILY
Qty: 90 TABLET | Refills: 2 | Status: SHIPPED | OUTPATIENT
Start: 2023-01-25 | End: 2023-02-24 | Stop reason: SDUPTHER

## 2023-01-25 NOTE — TELEPHONE ENCOUNTER
Buspar denied due to above daily pill limit.  There is a 15 mg tablet.  Can we send in those??    Faxed paper to pharmacy

## 2023-02-13 ENCOUNTER — OFFICE VISIT (OUTPATIENT)
Dept: PSYCHIATRY | Facility: CLINIC | Age: 23
End: 2023-02-13
Payer: MEDICAID

## 2023-02-13 DIAGNOSIS — F41.1 GENERALIZED ANXIETY DISORDER: ICD-10-CM

## 2023-02-13 DIAGNOSIS — F43.10 POST TRAUMATIC STRESS DISORDER (PTSD): Primary | ICD-10-CM

## 2023-02-13 PROCEDURE — 90791 PSYCH DIAGNOSTIC EVALUATION: CPT | Performed by: SOCIAL WORKER
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
## 2023-02-24 DIAGNOSIS — F41.1 GENERALIZED ANXIETY DISORDER: ICD-10-CM

## 2023-02-24 RX ORDER — BUSPIRONE HYDROCHLORIDE 15 MG/1
15 TABLET ORAL 3 TIMES DAILY
Qty: 90 TABLET | Refills: 2 | Status: SHIPPED | OUTPATIENT
Start: 2023-02-24

## 2023-02-24 NOTE — TELEPHONE ENCOUNTER
Caller: Xena Caicedo    Relationship: Self    Best call back number:    Requested Prescriptions:   Requested Prescriptions     Pending Prescriptions Disp Refills   • Vortioxetine HBr (Trintellix) 10 MG tablet tablet 30 tablet 1     Sig: Take 1 tablet by mouth Daily With Breakfast for 30 days.   • busPIRone (BUSPAR) 15 MG tablet 90 tablet 2     Sig: Take 1 tablet by mouth 3 (Three) Times a Day.        Pharmacy where request should be sent:  [facility]  [address] [place] [address]  [phone]  Additional details provided by patient:     Does the patient have less than a 3 day supply:  [x] Yes  [] No    Would you like a call back once the refill request has been completed: [x] Yes [] No    If the office needs to give you a call back, can they leave a voicemail: [x] Yes [] No    Karine Leavitt Rep   02/24/23 13:41 EST

## 2023-03-16 NOTE — PROGRESS NOTES
Subjective   Xena Caicedo is a 22 y.o. female who presents today for follow up for psychiatric medication management.     Chief Complaint: bipolar disorder, anxiety, insomnia.     History of Present Illness:     Medication adjustments last visit:   Continue Trintellix 10mg. Patient will pick this prescription up today.   Increase buspirone to 10mg, 1.5 tablets three times a day.   Increase hydroxyzine to 50mg at night for insomnia.     We discussed the possible need to add a mood stabilizer, but will see how she does on Trintellix before making a change or adding a medication.     At today's visit, she state she is taking 10mg Trintellix. Still has poor appetite.   Taking buspirone 15mg three times a day.   Taking hydroxyzine at night for sleep  We discussed adding Vraylar to help with mood. She was agreeable to this.   No suicidal thoughts. No HI/AVH.     Past medical history: hypoglycemia and black out spells (hsan't had one in about a week)  Past psychiatric history: depression, anxiety   Family history: None that she is aware of.   Social history: She does vape (nicotine), occasional alcohol, and does not report any other drugs.     Patient presents with symptoms and behaviors that are consistent with the following DSM-5 diagnoses:  1. Bipolar II disorder  2. Generalized anxiety disorder  3. Insomnia     The following portions of the patient's history were reviewed and updated as appropriate: allergies, current medications, past family history, past medical history, past social history, past surgical history and problem list.    PAST OUTPATIENT TREATMENT  Diagnosis treated:  Affective Disorder, Anxiety/Panic Disorder  Treatment Type:  Medication Management  Prior Psychiatric Medications:  Prozac--made her suicidal and homicidal.   Support Groups:  None  Sequelae Of Mental Disorder:  social isolation, family disruption, emotional distress    Interval History  improved    Side Effects  None from current  medications      Past Medical History:  Past Medical History:   Diagnosis Date   • Anxiety    • Palpitations        Social History:  Social History     Socioeconomic History   • Marital status: Single   Tobacco Use   • Smoking status: Never   • Smokeless tobacco: Never   • Tobacco comments:     Patient is advised to quit vaping, nicotine    Vaping Use   • Vaping Use: Every day   • Substances: Nicotine   • Devices: Refillable tank   • Passive vaping exposure: Yes   Substance and Sexual Activity   • Alcohol use: Not Currently     Comment: rarely/occ   • Drug use: Never   • Sexual activity: Defer       Family History:  Family History   Adopted: Yes       Past Surgical History:  History reviewed. No pertinent surgical history.    Problem List:  Patient Active Problem List   Diagnosis   • Hyperglycemia   • Syncope   • Palpitations   • Anxiety   • Diarrhea   • Elevated liver enzymes   • Encounter for other contraceptive management   • Dizziness   • Post traumatic stress disorder (PTSD)   • Generalized anxiety disorder       Allergy:   Allergies   Allergen Reactions   • Amoxicillin Hives   • Bee Venom Hives   • Lavender Oil Hives        Discontinued Medications:  There are no discontinued medications.    Current Medications:   Current Outpatient Medications   Medication Sig Dispense Refill   • busPIRone (BUSPAR) 15 MG tablet Take 1 tablet by mouth 3 (Three) Times a Day. 90 tablet 2   • glucose blood test strip Use as instructed 50 each 12   • glucose monitor monitoring kit 1 each As Needed (hypoglycemia). 1 each 0   • hydrOXYzine (ATARAX) 50 MG tablet Take 0.5 tablets by mouth 3 (Three) Times a Day As Needed for Itching. 30 tablet 1   • ibuprofen (ADVIL,MOTRIN) 200 MG tablet Take 1 tablet by mouth Every 6 (Six) Hours As Needed for Mild Pain.     • Lancets (freestyle) lancets Use to check blood glucose twice daily as needed DX: E11.65 100 each 11   • Vortioxetine HBr (Trintellix) 10 MG tablet tablet Take 1 tablet by mouth  Daily With Breakfast for 30 days. 30 tablet 1   • Cariprazine HCl (Vraylar) 1.5 MG capsule capsule Take 1 capsule by mouth Daily. 30 capsule 1     No current facility-administered medications for this visit.         Psychological ROS: positive for - anxiety, depression and sleep disturbances  negative for - behavioral disorder, concentration difficulties, decreased libido, disorientation, hallucinations, hostility, irritability, memory difficulties, mood swings, obsessive thoughts, physical abuse, sexual abuse or suicidal ideation      Physical Exam:   Blood pressure 128/80, pulse 97, weight (!) 140 kg (307 lb 12.8 oz), SpO2 98 %.    Mental Status Exam:   Hygiene:   good  Cooperation:  Cooperative  Eye Contact:  Good  Psychomotor Behavior:  Appropriate  Affect:  Appropriate  Mood: depressed  Hopelessness: Denies  Speech:  Normal  Thought Process:  Goal directed and Linear  Thought Content:  Normal  Suicidal:  None  Homicidal:  None  Hallucinations:  None  Delusion:  None  Memory:  Intact  Orientation:  Person, Place, Time and Situation  Reliability:  good  Insight:  Fair  Judgement:  Fair  Impulse Control:  Fair  Physical/Medical Issues:  No      MSE from 1/19/23 reviewed and accepted with necessary changes.     PHQ-9 Depression Screening    Little interest or pleasure in doing things? 2-->more than half the days   Feeling down, depressed, or hopeless? 2-->more than half the days   Trouble falling or staying asleep, or sleeping too much? 2-->more than half the days   Feeling tired or having little energy? 1-->several days   Poor appetite or overeating? 1-->several days   Feeling bad about yourself - or that you are a failure or have let yourself or your family down? 2-->more than half the days   Trouble concentrating on things, such as reading the newspaper or watching television? 2-->more than half the days   Moving or speaking so slowly that other people could have noticed? Or the opposite - being so fidgety or  restless that you have been moving around a lot more than usual? 2-->more than half the days   Thoughts that you would be better off dead, or of hurting yourself in some way? 0-->not at all   PHQ-9 Total Score 14   If you checked off any problems, how difficult have these problems made it for you to do your work, take care of things at home, or get along with other people? somewhat difficult        Current every day smoker less than 3 minutes spent counseling Not agreeable to stopping    I advised Xena of the risks of tobacco use.     Result Review:    Labs:  No visits with results within 3 Month(s) from this visit.   Latest known visit with results is:   Hospital Outpatient Visit on 08/26/2022   Component Date Value Ref Range Status   • Target HR (85%) 08/26/2022 167  bpm Final   • Max. Pred. HR (100%) 08/26/2022 196  bpm Final   • ACS 08/26/2022 2.28  cm Final   • Ao root diam 08/26/2022 3.4  cm Final   • Ao pk yon 08/26/2022 93.7  cm/sec Final   • Ao V2 VTI 08/26/2022 21.7  cm Final   • TEO(I,D) 08/26/2022 3.4  cm2 Final   • EDV(cubed) 08/26/2022 69.5  ml Final   • EDV(MOD-sp4) 08/26/2022 104.6  ml Final   • EF(MOD-bp) 08/26/2022 61.0  % Final   • EF(MOD-sp4) 08/26/2022 61.0  % Final   • ESV(cubed) 08/26/2022 27.0  ml Final   • ESV(MOD-sp4) 08/26/2022 40.8  ml Final   • IVS/LVPW 08/26/2022 1.01  cm Final   • LV mass(C)d 08/26/2022 108.2  grams Final   • LV V1 max PG 08/26/2022 3.4  mmHg Final   • LV V1 mean PG 08/26/2022 1.75  mmHg Final   • LV V1 max 08/26/2022 92.1  cm/sec Final   • LVPWd 08/26/2022 0.86  cm Final   • MV dec slope 08/26/2022 531.9  cm/sec2 Final   • MV dec time 08/26/2022 0.21  msec Final   • MV V2 VTI 08/26/2022 24.6  cm Final   • MVA(VTI) 08/26/2022 3.0  cm2 Final   • PA acc time 08/26/2022 0.15  sec Final   • PA pr(Accel) 08/26/2022 10.5  mmHg Final   • PA V2 max 08/26/2022 90.6  cm/sec Final   • Pulm A Revs Yon 08/26/2022 21.7  cm/sec Final   • RAP systole 08/26/2022 8.0  mmHg Final   • RV  V1 max PG 08/26/2022 0.98  mmHg Final   • RV V1 max 08/26/2022 49.5  cm/sec Final   • RV V1 VTI 08/26/2022 12.4  cm Final   • RVIDd 08/26/2022 3.6  cm Final   • RVSP(TR) 08/26/2022 26.5  mmHg Final   • SI(MOD-sp4) 08/26/2022 26.9  ml/m2 Final   • SV(LVOT) 08/26/2022 73.9  ml Final   • SV(MOD-sp4) 08/26/2022 63.8  ml Final   • TR max PG 08/26/2022 18.5  mmHg Final   • Ao max PG 08/26/2022 3.5  mmHg Final   • Ao mean PG 08/26/2022 2.00  mmHg Final   • FS 08/26/2022 27.1  % Final   • IVSd 08/26/2022 0.87  cm Final   • LA dimension (2D)  08/26/2022 3.6  cm Final   • LV V1 VTI 08/26/2022 18.6  cm Final   • LVIDd 08/26/2022 4.1  cm Final   • LVIDs 08/26/2022 3.0  cm Final   • LVOT area 08/26/2022 4.0  cm2 Final   • LVOT diam 08/26/2022 2.25  cm Final   • MV E/A 08/26/2022 1.73   Final   • MV max PG 08/26/2022 4.1  mmHg Final   • MV mean PG 08/26/2022 1.50  mmHg Final   • Pulm S/D 08/26/2022 0.81   Final   • MV A max yon 08/26/2022 64.3  cm/sec Final   • MV E max yon 08/26/2022 111.6  cm/sec Final   • Pulm A Revs Dur 08/26/2022 0.09  sec Final   • Pulm Colin Yon 08/26/2022 51.4  cm/sec Final   • Pulm Sys Yon 08/26/2022 41.5  cm/sec Final   • TR max yon 08/26/2022 215.3  cm/sec Final   • LV Colin Vol (BSA corrected) 08/26/2022 44.2  cm2 Final   • LV Sys Vol (BSA corrected) 08/26/2022 17.2  cm2 Final       Assessment & Plan   Diagnoses and all orders for this visit:    1. Bipolar II disorder (HCC) (Primary)  -     Cariprazine HCl (Vraylar) 1.5 MG capsule capsule; Take 1 capsule by mouth Daily.  Dispense: 30 capsule; Refill: 1    2. Generalized anxiety disorder    3. Insomnia due to mental condition         Continue Trintellix 10mg.Increase hydroxyzine to 50mg at night for insomnia.   Continue buspirone 15mg, three times daily.   Start Vraylar 1.5mg daily.       Visit Diagnoses:    ICD-10-CM ICD-9-CM   1. Bipolar II disorder (HCC)  F31.81 296.89   2. Generalized anxiety disorder  F41.1 300.02   3. Insomnia due to mental  condition  F51.05 300.9     327.02       TREATMENT PLAN/GOALS: Continue supportive psychotherapy efforts and medications as indicated. Treatment and medication options discussed during today's visit. Patient ackowledged and verbally consented to continue with current treatment plan and was educated on the importance of compliance with treatment and follow-up appointments.    MEDICATION ISSUES:  INSPECT reviewed as expected    Discussed medication options and treatment plan of prescribed medication as well as the risks, benefits, and side effects including potential falls, possible impaired driving and metabolic adversities among others. Patient is agreeable to call the office with any worsening of symptoms or onset of side effects. Patient is agreeable to call 911 or go to the nearest ER should he/she begin having SI/HI. No medication side effects or related complaints today.     MEDS ORDERED DURING VISIT:  New Medications Ordered This Visit   Medications   • Cariprazine HCl (Vraylar) 1.5 MG capsule capsule     Sig: Take 1 capsule by mouth Daily.     Dispense:  30 capsule     Refill:  1       Return in about 2 months (around 5/20/2023).         This document has been electronically signed by CAROLYN Ashley  March 20, 2023 11:57 EDT    Part of this note may be an electronic transcription/translation of spoken language to printed text using the Dragon Dictation System.

## 2023-03-20 ENCOUNTER — OFFICE VISIT (OUTPATIENT)
Dept: PSYCHIATRY | Facility: CLINIC | Age: 23
End: 2023-03-20
Payer: MEDICAID

## 2023-03-20 VITALS
HEART RATE: 97 BPM | BODY MASS INDEX: 46.12 KG/M2 | OXYGEN SATURATION: 98 % | DIASTOLIC BLOOD PRESSURE: 80 MMHG | SYSTOLIC BLOOD PRESSURE: 128 MMHG | WEIGHT: 293 LBS

## 2023-03-20 DIAGNOSIS — F31.81 BIPOLAR II DISORDER: Primary | Chronic | ICD-10-CM

## 2023-03-20 DIAGNOSIS — F41.1 GENERALIZED ANXIETY DISORDER: Chronic | ICD-10-CM

## 2023-03-20 DIAGNOSIS — F51.05 INSOMNIA DUE TO MENTAL CONDITION: Chronic | ICD-10-CM

## 2023-03-20 PROCEDURE — 1160F RVW MEDS BY RX/DR IN RCRD: CPT

## 2023-03-20 PROCEDURE — 99214 OFFICE O/P EST MOD 30 MIN: CPT

## 2023-03-20 PROCEDURE — 1159F MED LIST DOCD IN RCRD: CPT

## 2023-04-26 ENCOUNTER — OFFICE VISIT (OUTPATIENT)
Dept: PSYCHIATRY | Facility: CLINIC | Age: 23
End: 2023-04-26
Payer: MEDICAID

## 2023-04-26 DIAGNOSIS — F41.1 GENERALIZED ANXIETY DISORDER: ICD-10-CM

## 2023-04-26 DIAGNOSIS — F43.10 POST TRAUMATIC STRESS DISORDER (PTSD): Primary | ICD-10-CM

## 2023-04-26 NOTE — PROGRESS NOTES
"Date: April 26, 2023  Time In: 1108  Time Out: 1140      PROGRESS NOTE  Data:  Xena Caicedo is a 22 y.o. female who presents today for individual therapy session at [facility] with SHAHEEN Kemp, ZULEYMA.     Patient Chief Complaint: Follow-up for PTSD and anxiety    Clinical Maneuvering/Intervention: Xena is pleasant, alert and oriented to person place and time.  She talked at length about the things that she feels like she has to take care of and \"fix\" including her mother getting  again after only recently being  and her sister and her sister's significant other not getting along.  She verbalized feeling exhausted having to \"fix\" everyone's problems.  She explained at length that she has always done this as her mother would not try to help her or her siblings growing up.  We discussed setting boundaries, using empathetic listening and communication skills to help be supportive of her family while not feeling like she has to \"fix\" anything.  She did cut the visit short as she did have to be at work at noon.  She is also concerned about being reprimanded at work as she has missed a couple of days.    Assisted patient in processing above session content; acknowledged and normalized patient’s thoughts, feelings, and concerns. Rationalized patient thought process regarding setting boundaries with family members. Discussed triggers associated with patient's anxiety. Also discussed coping skills for patient to implement such as 7-11 breathing, and guided imagery.    Allowed patient to freely discuss issues without interruption or judgment. Provided safe, confidential environment to facilitate the development of positive therapeutic relationship and encourage open, honest communication. Assisted patient in identifying risk factors which would indicate the need for higher level of care including thoughts to harm self or others and/or self-harming behavior and encouraged patient to " contact this office, call 911, or present to the nearest emergency room should any of these events occur. Discussed crisis intervention services and means to access. Patient adamantly and convincingly denies current suicidal or homicidal ideation or perceptual disturbance.    Assessment   Patient appears to maintain relative stability as compared to their baseline. However, patient continues to struggle with PTSD and anxiety which continues to cause impairment in important areas of functioning. A result, they can be reasonably expected to continue to benefit from treatment and would likely be at increased risk for decompensation otherwise.    Mental Status Exam:   Hygiene:   good  Cooperation:  Cooperative  Eye Contact:  Good  Psychomotor Behavior:  Appropriate  Affect:  Appropriate  Mood: depressed and anxious  Speech:  Normal  Thought Process:  Goal directed and Linear  Thought Content:  Normal  Suicidal:  None  Homicidal:  None  Hallucinations:  None  Delusion:  None  Memory:  Intact  Orientation:  Person, Place, Time and Situation  Reliability:  good  Insight:  Fair  Judgement:  Fair  Impulse Control:  Fair  Physical/Medical Issues:  No      PHQ-Score Total:  PHQ-9 Total Score: PHQ-9 Depression Screening  Little interest or pleasure in doing things? 1-->several days   Feeling down, depressed, or hopeless? 2-->more than half the days   Trouble falling or staying asleep, or sleeping too much? 2-->more than half the days   Feeling tired or having little energy? 2-->more than half the days   Poor appetite or overeating? 1-->several days   Feeling bad about yourself - or that you are a failure or have let yourself or your family down? 2-->more than half the days   Trouble concentrating on things, such as reading the newspaper or watching television? 2-->more than half the days   Moving or speaking so slowly that other people could have noticed? Or the opposite - being so fidgety or restless that you have been moving  around a lot more than usual? 2-->more than half the days   Thoughts that you would be better off dead, or of hurting yourself in some way? 0-->not at all   PHQ-9 Total Score 14   If you checked off any problems, how difficult have these problems made it for you to do your work, take care of things at home, or get along with other people?        STANISLAW-7 Total Score:   Over the last two weeks, how often have you been bothered by the following problems?  Feeling nervous, anxious or on edge: More than half the days  Not being able to stop or control worrying: Nearly every day  Worrying too much about different things: Nearly every day  Trouble Relaxing: More than half the days  Being so restless that it is hard to sit still: Nearly every day  Becoming easily annoyed or irritable: More than half the days  Feeling afraid as if something awful might happen: More than half the days  STANISLAW 7 Total Score: 17     Patient's Support Network Includes:  Grandmother    Functional Status: Moderate impairment     Progress toward goal: Not at goal    Prognosis: Good with Ongoing Treatment          Plan     Resources: Patient was provided with the following community resources: None at this time    Patient will continue in individual outpatient therapy with focus on improved functioning and coping skills, maintaining stability, and avoiding decompensation and the need for higher level of care.    Patient will adhere to any medication regimens as prescribed and report any side effects. Patient will contact this office, call 911 or present to the nearest emergency room should suicidal or homicidal ideations occur. Provide cognitive behavioral therapy and solution focused therapy to improve functioning, maintain stability and avoid decompensation and the need for higher level of care.     Return in about 4 weeks, or earlier if symptoms worsen or fail to improve.           VISIT DIAGNOSIS:     ICD-10-CM ICD-9-CM   1. Post traumatic stress  disorder (PTSD)  F43.10 309.81   2. Generalized anxiety disorder  F41.1 300.02            This document has been electronically signed by SHAHEEN Kemp, MARTHAW   April 26, 2023 15:03 EDT      Part of this note may be an electronic transcription/translation of spoken language to printed text using the Dragon Dictation System.

## 2023-05-18 ENCOUNTER — TELEPHONE (OUTPATIENT)
Dept: PSYCHIATRY | Facility: CLINIC | Age: 23
End: 2023-05-18
Payer: MEDICAID

## 2023-05-18 DIAGNOSIS — F31.81 BIPOLAR II DISORDER: Chronic | ICD-10-CM

## 2023-05-18 NOTE — TELEPHONE ENCOUNTER
Patient called stating she feels like the Vraylar is working but she might need a little more.  Is there any way we can send in the next higher dose now and at her appointment in 2 weeks she will know how that one is doing for her.

## 2023-05-29 NOTE — PROGRESS NOTES
Subjective   Xena Caicedo is a 22 y.o. female who presents today for follow up for psychiatric medication management.     Chief Complaint: bipolar disorder, anxiety, insomnia.     History of Present Illness:     Medication adjustments last visit:   Continue Trintellix 10mg.Increase hydroxyzine to 50mg at night for insomnia.   Continue buspirone 15mg, three times daily.   Start Vraylar 1.5mg daily. On 3mg Vraylar    She feels like 3mg of Vraylar is doing ok. Working better than 1.5mg   Buspirone is working well for anxiety.   Appetite has been off and on still.   She feels like depression comes and goes, depending on the day. Sometimes a certain song pop up and it triggers her depression.   She misses her father. She recently had a butterfly come and sit on her and stay with her. She feels like this was her father visiting her.   No SI/HI/AVH.   She has gotten a puppy, that is 6 weeks old. She wants to get him trained to be a service animal.   We discussed trying to get in EMDR therapy for her childhood trauma. I let her know our therapist is booked out for a long time, but if she could reach out to [facility] to see if they have someone they could get her in specifically for EMDR. She is agreeable to doing this. I will put her on our waiting list in the meantime.     Past medical history: hypoglycemia and black out spells (hsan't had one in about a week)  Past psychiatric history: depression, anxiety   Family history: None that she is aware of.   Social history: She does vape (nicotine), occasional alcohol, and does not report any other drugs.     Patient presents with symptoms and behaviors that are consistent with the following DSM-5 diagnoses:  1. Bipolar II disorder  2. Generalized anxiety disorder  3. Insomnia   4. PTSD    The following portions of the patient's history were reviewed and updated as appropriate: allergies, current medications, past family history, past medical history, past social history,  past surgical history and problem list.    PAST OUTPATIENT TREATMENT  Diagnosis treated:  Affective Disorder, Anxiety/Panic Disorder  Treatment Type:  Medication Management  Prior Psychiatric Medications:  Prozac--made her suicidal and homicidal.   Support Groups:  None  Sequelae Of Mental Disorder:  social isolation, family disruption, emotional distress    Interval History  improved    Side Effects  None from current medications      Past Medical History:  Past Medical History:   Diagnosis Date   • Anxiety    • Palpitations        Social History:  Social History     Socioeconomic History   • Marital status: Single   Tobacco Use   • Smoking status: Never   • Smokeless tobacco: Never   • Tobacco comments:     Patient is advised to quit vaping, nicotine    Vaping Use   • Vaping Use: Former   Substance and Sexual Activity   • Alcohol use: Not Currently     Comment: rarely/occ   • Drug use: Never   • Sexual activity: Defer       Family History:  Family History   Adopted: Yes       Past Surgical History:  History reviewed. No pertinent surgical history.    Problem List:  Patient Active Problem List   Diagnosis   • Hyperglycemia   • Syncope   • Palpitations   • Anxiety   • Diarrhea   • Elevated liver enzymes   • Encounter for other contraceptive management   • Dizziness   • Post traumatic stress disorder (PTSD)   • Generalized anxiety disorder       Allergy:   Allergies   Allergen Reactions   • Amoxicillin Hives   • Bee Venom Hives   • Lavender Oil Hives        Discontinued Medications:  Medications Discontinued During This Encounter   Medication Reason   • Vortioxetine HBr (Trintellix) 10 MG tablet tablet Reorder       Current Medications:   Current Outpatient Medications   Medication Sig Dispense Refill   • busPIRone (BUSPAR) 15 MG tablet Take 1 tablet by mouth 3 (Three) Times a Day. 90 tablet 2   • Cariprazine HCl (Vraylar) 3 MG capsule capsule Take 1 capsule by mouth Daily. 30 capsule 2   • glucose blood test strip  Use as instructed 50 each 12   • glucose monitor monitoring kit 1 each As Needed (hypoglycemia). 1 each 0   • hydrOXYzine (ATARAX) 50 MG tablet Take 0.5 tablets by mouth 3 (Three) Times a Day As Needed for Itching. 30 tablet 1   • ibuprofen (ADVIL,MOTRIN) 200 MG tablet Take 1 tablet by mouth Every 6 (Six) Hours As Needed for Mild Pain.     • Lancets (freestyle) lancets Use to check blood glucose twice daily as needed DX: E11.65 100 each 11   • ondansetron ODT (ZOFRAN-ODT) 4 MG disintegrating tablet Place 1 tablet on the tongue Every 6 (Six) Hours As Needed for Nausea or Vomiting. 20 tablet 0   • promethazine-dextromethorphan (PROMETHAZINE-DM) 6.25-15 MG/5ML syrup Take 5 mL by mouth 4 (Four) Times a Day As Needed for Cough. 90 mL 0   • Vortioxetine HBr (Trintellix) 10 MG tablet tablet Take 1 tablet by mouth Daily With Breakfast for 30 days. 30 tablet 3   • azithromycin (Zithromax Z-Reinaldo) 250 MG tablet Take 2 tablets the first day, then 1 tablet daily for 4 days. (Patient not taking: Reported on 6/1/2023) 6 tablet 0     No current facility-administered medications for this visit.         Psychological ROS: positive for - anxiety, depression and sleep disturbances  negative for - behavioral disorder, concentration difficulties, decreased libido, disorientation, hallucinations, hostility, irritability, memory difficulties, mood swings, obsessive thoughts, physical abuse, sexual abuse or suicidal ideation      Physical Exam:   There were no vitals taken for this visit.    Mental Status Exam:   Hygiene:   good  Cooperation:  Cooperative  Eye Contact:  Good  Psychomotor Behavior:  Appropriate  Affect:  Appropriate  Mood: depressed  Hopelessness: Denies  Speech:  Normal  Thought Process:  Goal directed and Linear  Thought Content:  Normal  Suicidal:  None  Homicidal:  None  Hallucinations:  None  Delusion:  None  Memory:  Intact  Orientation:  Person, Place, Time and Situation  Reliability:  good  Insight:  Fair  Judgement:   Fair  Impulse Control:  Fair  Physical/Medical Issues:  No      MSE from 3/20/23 reviewed and accepted with necessary changes.     PHQ-9 Depression Screening    Little interest or pleasure in doing things? 2-->more than half the days   Feeling down, depressed, or hopeless? 2-->more than half the days   Trouble falling or staying asleep, or sleeping too much? 2-->more than half the days   Feeling tired or having little energy? 1-->several days   Poor appetite or overeating? 1-->several days   Feeling bad about yourself - or that you are a failure or have let yourself or your family down? 2-->more than half the days   Trouble concentrating on things, such as reading the newspaper or watching television? 2-->more than half the days   Moving or speaking so slowly that other people could have noticed? Or the opposite - being so fidgety or restless that you have been moving around a lot more than usual? 2-->more than half the days   Thoughts that you would be better off dead, or of hurting yourself in some way? 0-->not at all   PHQ-9 Total Score 14   If you checked off any problems, how difficult have these problems made it for you to do your work, take care of things at home, or get along with other people? somewhat difficult        Current every day smoker less than 3 minutes spent counseling Not agreeable to stopping    I advised Xena of the risks of tobacco use.     Result Review:    Labs:  Admission on 04/23/2023, Discharged on 04/23/2023   Component Date Value Ref Range Status   • SARS Antigen 04/23/2023 Not Detected  Not Detected, Presumptive Negative Final   • Influenza A Antigen DENILSON 04/23/2023 Not Detected  Not Detected Final   • Influenza B Antigen DENILSON 04/23/2023 Not Detected  Not Detected Final   • Internal Control 04/23/2023 Passed  Passed Final   • Lot Number 04/23/2023 [id number]   Final   • Expiration Date 04/23/2023 3/22/24   Final       Assessment & Plan   Diagnoses and all orders for this visit:    1.  Bipolar II disorder (Primary)    2. Post traumatic stress disorder (PTSD)  -     Ambulatory Referral to Behavioral Health    3. Generalized anxiety disorder    4. Insomnia due to mental condition    Other orders  -     Vortioxetine HBr (Trintellix) 10 MG tablet tablet; Take 1 tablet by mouth Daily With Breakfast for 30 days.  Dispense: 30 tablet; Refill: 3         Continue Trintellix 10mg.  Increase hydroxyzine to 50mg at night for insomnia.   Continue buspirone 15mg, three times daily.   Continue Vraylar 3mg daily.       Visit Diagnoses:    ICD-10-CM ICD-9-CM   1. Bipolar II disorder  F31.81 296.89   2. Post traumatic stress disorder (PTSD)  F43.10 309.81   3. Generalized anxiety disorder  F41.1 300.02   4. Insomnia due to mental condition  F51.05 300.9     327.02       TREATMENT PLAN/GOALS: Continue supportive psychotherapy efforts and medications as indicated. Treatment and medication options discussed during today's visit. Patient ackowledged and verbally consented to continue with current treatment plan and was educated on the importance of compliance with treatment and follow-up appointments.    MEDICATION ISSUES:  INSPECT reviewed as expected    Discussed medication options and treatment plan of prescribed medication as well as the risks, benefits, and side effects including potential falls, possible impaired driving and metabolic adversities among others. Patient is agreeable to call the office with any worsening of symptoms or onset of side effects. Patient is agreeable to call 911 or go to the nearest ER should he/she begin having SI/HI. No medication side effects or related complaints today.     MEDS ORDERED DURING VISIT:  New Medications Ordered This Visit   Medications   • Vortioxetine HBr (Trintellix) 10 MG tablet tablet     Sig: Take 1 tablet by mouth Daily With Breakfast for 30 days.     Dispense:  30 tablet     Refill:  3       Return in about 4 months (around 10/1/2023).         This document has  been electronically signed by Ijeoma Teresa, APRN  June 1, 2023 11:19 EDT    Part of this note may be an electronic transcription/translation of spoken language to printed text using the Dragon Dictation System.

## 2023-06-01 ENCOUNTER — OFFICE VISIT (OUTPATIENT)
Dept: PSYCHIATRY | Facility: CLINIC | Age: 23
End: 2023-06-01

## 2023-06-01 DIAGNOSIS — F31.81 BIPOLAR II DISORDER: Primary | Chronic | ICD-10-CM

## 2023-06-01 DIAGNOSIS — F51.05 INSOMNIA DUE TO MENTAL CONDITION: Chronic | ICD-10-CM

## 2023-06-01 DIAGNOSIS — F43.10 POST TRAUMATIC STRESS DISORDER (PTSD): Chronic | ICD-10-CM

## 2023-06-01 DIAGNOSIS — F41.1 GENERALIZED ANXIETY DISORDER: Chronic | ICD-10-CM

## 2023-07-05 ENCOUNTER — TELEPHONE (OUTPATIENT)
Dept: FAMILY MEDICINE CLINIC | Facility: CLINIC | Age: 23
End: 2023-07-05

## 2023-07-05 NOTE — TELEPHONE ENCOUNTER
Caller: Xena Caicedo    Relationship: Self    Best call back number: [phone]    PATIENT NEEDS HER SERVICE DOG PAPERS RENEWED- SHE WOULD LIKE TO COME PICK THIS UP IN OFFICE ONCE COMPLETED.    PLEASE ADVISE

## 2023-07-05 NOTE — TELEPHONE ENCOUNTER
I also do not see any notes or paperwork in her chart.  If she able to bring in a copy of what she has currently?

## 2023-08-15 DIAGNOSIS — F31.81 BIPOLAR II DISORDER: Chronic | ICD-10-CM

## 2023-08-15 RX ORDER — CARIPRAZINE 3 MG/1
CAPSULE, GELATIN COATED ORAL
Qty: 30 CAPSULE | Refills: 2 | Status: SHIPPED | OUTPATIENT
Start: 2023-08-15

## 2023-09-09 DIAGNOSIS — F41.1 GENERALIZED ANXIETY DISORDER: ICD-10-CM

## 2023-09-10 RX ORDER — BUSPIRONE HYDROCHLORIDE 15 MG/1
TABLET ORAL
Qty: 90 TABLET | Refills: 2 | Status: SHIPPED | OUTPATIENT
Start: 2023-09-10

## 2023-10-06 DIAGNOSIS — F31.81 BIPOLAR II DISORDER: Chronic | ICD-10-CM

## 2023-10-06 NOTE — TELEPHONE ENCOUNTER
Rx Refill Note  Requested Prescriptions     Pending Prescriptions Disp Refills    Vortioxetine HBr (Trintellix) 10 MG tablet tablet 30 tablet 3     Sig: Take 1 tablet by mouth Daily With Breakfast for 30 days.    Cariprazine HCl (Vraylar) 3 MG capsule capsule 30 capsule 2     Sig: Take 1 capsule by mouth Daily.        Last office visit with prescribing clinician: 6/1/2023     Next office visit with prescribing clinician: 10/31/2023     Office Visit with Ijeoma Teresa APRN (06/01/2023)     Gale Rojas  10/06/23, 09:37 EDT     Patient states she is taking 2 of the 1.5 mg and it is working really good for her.  She asked for a refill on her Trintellix also.

## 2023-10-17 ENCOUNTER — TELEPHONE (OUTPATIENT)
Dept: FAMILY MEDICINE CLINIC | Facility: CLINIC | Age: 23
End: 2023-10-17
Payer: MEDICAID

## 2023-10-17 NOTE — TELEPHONE ENCOUNTER
Caller: Xena Caicedo    Relationship: Self    Best call back number: [phone]     What was the call regarding: PATIENT REQUESTING A DOCTORS NOTE THAT BLACK MOLD IS A DANGER TO HER HEALTH.  SHE HAS RECEIVED 4 CITATIONS FROM THE HEALTH DEPARTMENT REGARDING BLACK MOD IN HER APARTMENT.  PLEASE ADVISE    Is it okay if the provider responds through MyChart: YES

## 2023-10-17 NOTE — TELEPHONE ENCOUNTER
If we have not seen patient in the past regarding this specific concern - she needs to be seen to discuss concerns, testing, possible symptoms, etc.

## 2023-10-30 NOTE — PROGRESS NOTES
Subjective   Xena Caicedo is a 23 y.o. female who presents today for follow up for psychiatric medication management.     Chief Complaint: bipolar disorder, anxiety, insomnia.     History of Present Illness:     Medication adjustments last visit:   Continue Trintellix 10mg.  Increase hydroxyzine to 50mg at night for insomnia.   Continue buspirone 15mg, three times daily.   Continue Vraylar 3mg daily.     She feels like she is still having mood swings. She can't tell any difference really since being on the Vraylar.   She is not sleeping well lately. When she gets off work, she is wide awake and she stays up until about 3am.   She feels like she is having racing thoughts at night which keeps her from sleeping.   We discussed changing the Vraylar to Caplyta. Will order 21mg for 2 weeks then increase to 42mg.   Advised patient to take at night, and if she is still not sleeping in a couple weeks, call and let me know and we can change the hydroxyzine.   Denies any SI.    Previous visit:   She feels like 3mg of Vraylar is doing ok. Working better than 1.5mg   Buspirone is working well for anxiety.   Appetite has been off and on still.   She feels like depression comes and goes, depending on the day. Sometimes a certain song pop up and it triggers her depression.   She misses her father. She recently had a butterfly come and sit on her and stay with her. She feels like this was her father visiting her.   No SI/HI/AVH.   She has gotten a puppy, that is 6 weeks old. She wants to get him trained to be a service animal.   We discussed trying to get in EMDR therapy for her childhood trauma. I let her know our therapist is booked out for a long time, but if she could reach out to [facility] to see if they have someone they could get her in specifically for EMDR. She is agreeable to doing this. I will put her on our waiting list in the meantime.     Past medical history: hypoglycemia and black out spells (hsan't had one in  about a week)  Past psychiatric history: depression, anxiety   Family history: None that she is aware of.   Social history: She does vape (nicotine), occasional alcohol, and does not report any other drugs.     Patient presents with symptoms and behaviors that are consistent with the following DSM-5 diagnoses:  Bipolar II disorder  2. Generalized anxiety disorder  3. Insomnia   4. PTSD    The following portions of the patient's history were reviewed and updated as appropriate: allergies, current medications, past family history, past medical history, past social history, past surgical history and problem list.    PAST OUTPATIENT TREATMENT  Diagnosis treated:  Affective Disorder, Anxiety/Panic Disorder  Treatment Type:  Medication Management  Prior Psychiatric Medications:  Prozac--made her suicidal and homicidal.   Vraylar--ineffective at 3mg.     Support Groups:  None  Sequelae Of Mental Disorder:  social isolation, family disruption, emotional distress    Interval History  Deteriorated     Side Effects  None from current medications      Past Medical History:  Past Medical History:   Diagnosis Date    Anxiety     Palpitations        Social History:  Social History     Socioeconomic History    Marital status: Single   Tobacco Use    Smoking status: Never    Smokeless tobacco: Never    Tobacco comments:     Patient is advised to quit vaping, nicotine    Vaping Use    Vaping Use: Former   Substance and Sexual Activity    Alcohol use: Not Currently     Comment: rarely/occ    Drug use: Never    Sexual activity: Defer       Family History:  Family History   Adopted: Yes       Past Surgical History:  History reviewed. No pertinent surgical history.    Problem List:  Patient Active Problem List   Diagnosis    Hyperglycemia    Syncope    Palpitations    Anxiety    Diarrhea    Elevated liver enzymes    Encounter for other contraceptive management    Dizziness    Post traumatic stress disorder (PTSD)    Generalized anxiety  disorder       Allergy:   Allergies   Allergen Reactions    Amoxicillin Hives    Bee Venom Hives    Lavender Oil Hives        Discontinued Medications:  Medications Discontinued During This Encounter   Medication Reason    Cariprazine HCl (Vraylar) 3 MG capsule capsule     busPIRone (BUSPAR) 15 MG tablet Reorder         Current Medications:   Current Outpatient Medications   Medication Sig Dispense Refill    busPIRone (BUSPAR) 15 MG tablet Take 1 tablet by mouth 3 (Three) Times a Day. 90 tablet 2    azithromycin (Zithromax Z-Reinaldo) 250 MG tablet Take 2 tablets the first day, then 1 tablet daily for 4 days. (Patient not taking: Reported on 6/1/2023) 6 tablet 0    glucose blood test strip Use as instructed 50 each 12    glucose monitor monitoring kit 1 each As Needed (hypoglycemia). 1 each 0    hydrOXYzine (ATARAX) 50 MG tablet Take 0.5 tablets by mouth 3 (Three) Times a Day As Needed for Itching. 30 tablet 1    ibuprofen (ADVIL,MOTRIN) 200 MG tablet Take 1 tablet by mouth Every 6 (Six) Hours As Needed for Mild Pain.      Lancets (freestyle) lancets Use to check blood glucose twice daily as needed DX: E11.65 100 each 11    Lumateperone Tosylate (Caplyta) 21 MG capsule Take 1 capsule by mouth Every Night. 14 capsule 0    Lumateperone Tosylate 42 MG capsule Take 1 capsule by mouth Every Night. 30 capsule 1    ondansetron ODT (ZOFRAN-ODT) 4 MG disintegrating tablet Place 1 tablet on the tongue Every 6 (Six) Hours As Needed for Nausea or Vomiting. 20 tablet 0    promethazine-dextromethorphan (PROMETHAZINE-DM) 6.25-15 MG/5ML syrup Take 5 mL by mouth 4 (Four) Times a Day As Needed for Cough. 90 mL 0    Vortioxetine HBr (Trintellix) 10 MG tablet tablet Take 1 tablet by mouth Daily With Breakfast. 30 tablet 2     No current facility-administered medications for this visit.         Psychological ROS: positive for - anxiety, depression and sleep disturbances  negative for - behavioral disorder, concentration difficulties,  decreased libido, disorientation, hallucinations, hostility, irritability, memory difficulties, mood swings, obsessive thoughts, physical abuse, sexual abuse or suicidal ideation      Physical Exam:   There were no vitals taken for this visit.    Mental Status Exam:   Hygiene:   good  Cooperation:  Cooperative  Eye Contact:  Good  Psychomotor Behavior:  Appropriate  Affect:  Appropriate  Mood: depressed  Hopelessness: Denies  Speech:  Normal  Thought Process:  Goal directed and Linear  Thought Content:  Normal  Suicidal:  None  Homicidal:  None  Hallucinations:  None  Delusion:  None  Memory:  Intact  Orientation:  Person, Place, Time and Situation  Reliability:  good  Insight:  Fair  Judgement:  Fair  Impulse Control:  Fair  Physical/Medical Issues:  No      MSE from 6/1/23 reviewed and accepted with necessary changes.     PHQ-9 Depression Screening    Little interest or pleasure in doing things? 2-->more than half the days   Feeling down, depressed, or hopeless? 2-->more than half the days   Trouble falling or staying asleep, or sleeping too much? 3-->nearly every day   Feeling tired or having little energy? 2-->more than half the days   Poor appetite or overeating? 2-->more than half the days   Feeling bad about yourself - or that you are a failure or have let yourself or your family down? 2-->more than half the days   Trouble concentrating on things, such as reading the newspaper or watching television? 2-->more than half the days   Moving or speaking so slowly that other people could have noticed? Or the opposite - being so fidgety or restless that you have been moving around a lot more than usual? 2-->more than half the days   Thoughts that you would be better off dead, or of hurting yourself in some way? 0-->not at all   PHQ-9 Total Score 17   If you checked off any problems, how difficult have these problems made it for you to do your work, take care of things at home, or get along with other people?  somewhat difficult        Current every day smoker less than 3 minutes spent counseling Not agreeable to stopping    I advised Xena of the risks of tobacco use.     Result Review:    Labs:  No visits with results within 3 Month(s) from this visit.   Latest known visit with results is:   Admission on 04/23/2023, Discharged on 04/23/2023   Component Date Value Ref Range Status    SARS Antigen 04/23/2023 Not Detected  Not Detected, Presumptive Negative Final    Influenza A Antigen DENILSON 04/23/2023 Not Detected  Not Detected Final    Influenza B Antigen DENILSON 04/23/2023 Not Detected  Not Detected Final    Internal Control 04/23/2023 Passed  Passed Final    Lot Number 04/23/2023 [id number]   Final    Expiration Date 04/23/2023 3/22/24   Final       Assessment & Plan   Diagnoses and all orders for this visit:    1. Bipolar II disorder (Primary)  -     Lumateperone Tosylate (Caplyta) 21 MG capsule; Take 1 capsule by mouth Every Night.  Dispense: 14 capsule; Refill: 0  -     Lumateperone Tosylate 42 MG capsule; Take 1 capsule by mouth Every Night.  Dispense: 30 capsule; Refill: 1    2. Generalized anxiety disorder  -     busPIRone (BUSPAR) 15 MG tablet; Take 1 tablet by mouth 3 (Three) Times a Day.  Dispense: 90 tablet; Refill: 2    3. Insomnia due to mental condition    4. Post traumatic stress disorder (PTSD)           Continue Trintellix 10mg.  Continue hydroxyzine to 50mg at night for insomnia.   Continue buspirone 15mg, three times daily.   Stop Vraylar.   Start Caplyta 21mg for 2 weeks, then increase to 42mg.       Visit Diagnoses:    ICD-10-CM ICD-9-CM   1. Bipolar II disorder  F31.81 296.89   2. Generalized anxiety disorder  F41.1 300.02   3. Insomnia due to mental condition  F51.05 300.9     327.02   4. Post traumatic stress disorder (PTSD)  F43.10 309.81         TREATMENT PLAN/GOALS: Continue supportive psychotherapy efforts and medications as indicated. Treatment and medication options discussed during today's visit.  Patient ackowledged and verbally consented to continue with current treatment plan and was educated on the importance of compliance with treatment and follow-up appointments.    MEDICATION ISSUES:  INSPECT reviewed as expected    Discussed medication options and treatment plan of prescribed medication as well as the risks, benefits, and side effects including potential falls, possible impaired driving and metabolic adversities among others. Patient is agreeable to call the office with any worsening of symptoms or onset of side effects. Patient is agreeable to call 911 or go to the nearest ER should he/she begin having SI/HI. No medication side effects or related complaints today.     MEDS ORDERED DURING VISIT:  New Medications Ordered This Visit   Medications    busPIRone (BUSPAR) 15 MG tablet     Sig: Take 1 tablet by mouth 3 (Three) Times a Day.     Dispense:  90 tablet     Refill:  2    Lumateperone Tosylate (Caplyta) 21 MG capsule     Sig: Take 1 capsule by mouth Every Night.     Dispense:  14 capsule     Refill:  0    Lumateperone Tosylate 42 MG capsule     Sig: Take 1 capsule by mouth Every Night.     Dispense:  30 capsule     Refill:  1     Start after 2 weeks on 21mg.       Return in about 4 weeks (around 11/28/2023).         This document has been electronically signed by CAROLYN Ashley  October 31, 2023 13:12 EDT    Part of this note may be an electronic transcription/translation of spoken language to printed text using the Dragon Dictation System.

## 2023-10-31 ENCOUNTER — OFFICE VISIT (OUTPATIENT)
Dept: PSYCHIATRY | Facility: CLINIC | Age: 23
End: 2023-10-31
Payer: MEDICAID

## 2023-10-31 DIAGNOSIS — F31.81 BIPOLAR II DISORDER: Primary | Chronic | ICD-10-CM

## 2023-10-31 DIAGNOSIS — F41.1 GENERALIZED ANXIETY DISORDER: Chronic | ICD-10-CM

## 2023-10-31 DIAGNOSIS — F51.05 INSOMNIA DUE TO MENTAL CONDITION: Chronic | ICD-10-CM

## 2023-10-31 DIAGNOSIS — F43.10 POST TRAUMATIC STRESS DISORDER (PTSD): Chronic | ICD-10-CM

## 2023-10-31 RX ORDER — BUSPIRONE HYDROCHLORIDE 15 MG/1
15 TABLET ORAL 3 TIMES DAILY
Qty: 90 TABLET | Refills: 2 | Status: SHIPPED | OUTPATIENT
Start: 2023-10-31

## 2023-10-31 RX ORDER — LUMATEPERONE 21 MG/1
21 CAPSULE ORAL NIGHTLY
Qty: 14 CAPSULE | Refills: 0 | Status: SHIPPED | OUTPATIENT
Start: 2023-10-31

## 2023-11-12 DIAGNOSIS — F31.81 BIPOLAR II DISORDER: Chronic | ICD-10-CM

## 2023-11-13 RX ORDER — LUMATEPERONE 21 MG/1
1 CAPSULE ORAL NIGHTLY
Qty: 14 CAPSULE | Refills: 0 | OUTPATIENT
Start: 2023-11-13

## 2023-11-17 ENCOUNTER — OFFICE VISIT (OUTPATIENT)
Dept: FAMILY MEDICINE CLINIC | Facility: CLINIC | Age: 23
End: 2023-11-17
Payer: MEDICAID

## 2023-11-17 VITALS
OXYGEN SATURATION: 98 % | DIASTOLIC BLOOD PRESSURE: 70 MMHG | HEART RATE: 86 BPM | BODY MASS INDEX: 43.4 KG/M2 | HEIGHT: 69 IN | WEIGHT: 293 LBS | SYSTOLIC BLOOD PRESSURE: 128 MMHG

## 2023-11-17 DIAGNOSIS — Z77.120 MOLD EXPOSURE: ICD-10-CM

## 2023-11-17 DIAGNOSIS — R05.3 CHRONIC COUGH: ICD-10-CM

## 2023-11-17 DIAGNOSIS — R09.81 CHRONIC NASAL CONGESTION: Primary | ICD-10-CM

## 2023-11-17 PROCEDURE — 86003 ALLG SPEC IGE CRUDE XTRC EA: CPT | Performed by: NURSE PRACTITIONER

## 2023-11-17 RX ORDER — MONTELUKAST SODIUM 10 MG/1
10 TABLET ORAL NIGHTLY
Qty: 30 TABLET | Refills: 1 | Status: SHIPPED | OUTPATIENT
Start: 2023-11-17

## 2023-11-17 RX ORDER — FLUTICASONE PROPIONATE 50 MCG
2 SPRAY, SUSPENSION (ML) NASAL DAILY
Qty: 1 ML | Refills: 8 | Status: SHIPPED | OUTPATIENT
Start: 2023-11-17

## 2023-11-17 NOTE — PROGRESS NOTES
"Chief Complaint  Follow-up (Mold exposure in apartment. Needs a statement. Causing issues with breathing and sleeping at night )    Subjective        Xena Caicedo presents to [facility]  History of Present Illness    Patient presents with concerns of breathing concerns regarding black mold. She describes chronic nasal congestion and productive cough. Patient denies wheezing or dyspnea. Symptoms reportedly ongoing x 2 years. Patient denies known hx asthma. She is not taking anything daily for allergies. Patient has a hx of syncopal episodes - has been evaluated by Cardiology and Neurology, otherwise unremarkable. Patient reports [place] Department involved, describes as inhabitable living. She has an [employer] company that is cleaning the mold out of her home.     Objective   Vital Signs:  /70 (BP Location: Left arm, Patient Position: Sitting, Cuff Size: Large Adult)   Pulse 86   Ht 175.3 cm (69\")   Wt (!) 141 kg (310 lb)   SpO2 98%   BMI 45.78 kg/m²   Estimated body mass index is 45.78 kg/m² as calculated from the following:    Height as of this encounter: 175.3 cm (69\").    Weight as of this encounter: 141 kg (310 lb).             Physical Exam  Constitutional:       Appearance: Normal appearance.   HENT:      Head: Normocephalic.      Nose: Congestion present.   Cardiovascular:      Rate and Rhythm: Normal rate and regular rhythm.   Pulmonary:      Effort: Pulmonary effort is normal.      Breath sounds: Normal breath sounds.   Abdominal:      General: Abdomen is flat. Bowel sounds are normal.      Palpations: Abdomen is soft.   Musculoskeletal:         General: Normal range of motion.      Cervical back: Neck supple.      Right lower leg: No edema.      Left lower leg: No edema.   Skin:     General: Skin is warm and dry.   Neurological:      Mental Status: She is alert and oriented to person, place, and time.      Gait: Gait is intact.   Psychiatric:         " Attention and Perception: Attention normal.         Mood and Affect: Mood normal.         Speech: Speech normal.        Result Review :                   Assessment and Plan   Diagnoses and all orders for this visit:    1. Chronic nasal congestion (Primary)  -     Allergens (15) Molds    2. Chronic cough  -     XR Chest PA & Lateral; Future  -     Allergens (15) Molds    3. Mold exposure    Other orders  -     montelukast (Singulair) 10 MG tablet; Take 1 tablet by mouth Every Night.  Dispense: 30 tablet; Refill: 1  -     fluticasone (FLONASE) 50 MCG/ACT nasal spray; 2 sprays into the nostril(s) as directed by provider Daily.  Dispense: 1 mL; Refill: 8    - Labs today  - CXR  - Start Singulair 10 mg nightly  - Add Flonase  - Patient needing note to provide to apartment complex that mold exposure is a health risk       I spent 30 minutes caring for Xena on this date of service. This time includes time spent by me in the following activities:preparing for the visit, reviewing tests, obtaining and/or reviewing a separately obtained history, performing a medically appropriate examination and/or evaluation , counseling and educating the patient/family/caregiver, ordering medications, tests, or procedures, documenting information in the medical record, and care coordination  Follow Up   Return for Next scheduled follow up.  Patient was given instructions and counseling regarding her condition or for health maintenance advice. Please see specific information pulled into the AVS if appropriate.

## 2023-11-17 NOTE — PROGRESS NOTES
Venipuncture Blood Specimen Collection  Venipuncture performed in the right arm by Julia Mckenna MA with good hemostasis. Patient tolerated the procedure well without complications.   11/17/23   Julia Mckenna MA

## 2023-11-23 LAB
A ALTERNATA IGE QN: <0.1 KU/L
A FUMIGATUS IGE QN: <0.1 KU/L
A PULLULANS IGE QN: <0.1 KU/L
ACREMONIUM IGE QN: <0.1 KU/L
C ALBICANS IGE QN: <0.1 KU/L
C HERBARUM IGE QN: <0.1 KU/L
C LUNATA IGE QN: <0.1 KU/L
CONV CLASS DESCRIPTION: NORMAL
E PURPURASCENS IGE QN: <0.1 KU/L
F MONILIFORME IGE QN: <0.1 KU/L
M RACEMOSUS IGE QN: <0.1 KU/L
P BETAE IGE QN: <0.1 KU/L
P NOTATUM IGE QN: <0.1 KU/L
R NIGRICANS IGE QN: <0.1 KU/L
S BOTRYOSUM IGE QN: <0.1 KU/L
S ROSTRATA IGE QN: <0.1 KU/L

## 2023-12-04 NOTE — PROGRESS NOTES
Date of Office Visit: 2023  Encounter Provider: Dr. Chang Sherman  Place of Service: [facility] [place]  Patient Name: Xena Caicedo  :2000  Katie Espino APRN    Chief Complaint   Patient presents with    Palpitations    Follow-up     History of Present Illness    I am pleased to see Mrs. Caicedo in my office today as a follow-up    As you know, patient is 23 years old white female whose past medical history is insignificant for any medical illness except for anxiety disorder, obesity, who came today for follow-up.    In May 2022, patient started having episodes of syncope.  She described this as a blackout.  Last episode was 2 days ago.  Patient was standing for the bus with her son.  He started having dizziness.  Patient have a blurring of vision and blackout and fell down on the ground but she was assisted by other people and was able to walk to the bedroom.  Patient did not have any prodromal symptom except for dizziness or lightheadedness.  She occasionally complain of palpitation.  Patient has few chest pains.  Patient denies any orthopnea PND patient has exertional shortness of breath.  Patient had 4-5 episodes in last 1 month.    In May 25, 2022, Holter monitor showed predominantly sinus rhythm with isolated PACs.  No SVT or VT was noted.  No significant pause of bradycardia was noted.  Patient had short run of atrial tachycardia for 5 beats.    Patient does not have previous history of diabetes mellitus, CAD, PCI or congenital heart disease.  Patient vape.  Patient does not use alcohol.    In 2022, patient underwent tilt table test and it was negative for hypotension or bradycardia.  In 2022, patient underwent echocardiogram which showed EF of 60 to 65%.  No significant valvular heart disease noted.  Stress test showed no ischemia or myocardial infarction on Cardiolite imaging.    Patient came today for yearly follow-up.  Patient overall is doing well.  She  has not had any further episode of syncope or passing out.  Patient occasionally feels dizzy especially bending forward.  Patient anxiety is under control on current treatment.  She denies any chest pain.  Mild shortness of breath on exertion reported.  No significant leg edema.    EKG showed sinus rhythm with nonspecific ST changes inferiorly.  No change from previous EKG.    Patient has not had any further episode of syncope.  No further cardiac workup needed.  Patient had extensive cardiac workup in the past.  Patient is advised to call me if there is any worsening of symptoms or recurrence of symptoms.  I will see the patient as needed patient is advised to follow-up with PCP.      Past Medical History:   Diagnosis Date    Anxiety     Palpitations          History reviewed. No pertinent surgical history.        Current Outpatient Medications:     busPIRone (BUSPAR) 15 MG tablet, Take 1 tablet by mouth 3 (Three) Times a Day., Disp: 90 tablet, Rfl: 2    fluticasone (FLONASE) 50 MCG/ACT nasal spray, 2 sprays into the nostril(s) as directed by provider Daily., Disp: 1 mL, Rfl: 8    hydrOXYzine (ATARAX) 50 MG tablet, Take 0.5 tablets by mouth 3 (Three) Times a Day As Needed for Itching., Disp: 30 tablet, Rfl: 1    ibuprofen (ADVIL,MOTRIN) 200 MG tablet, Take 1 tablet by mouth Every 6 (Six) Hours As Needed for Mild Pain., Disp: , Rfl:     Lancets (freestyle) lancets, Use to check blood glucose twice daily as needed DX: E11.65, Disp: 100 each, Rfl: 11    Lumateperone Tosylate 42 MG capsule, Take 1 capsule by mouth Every Night., Disp: 30 capsule, Rfl: 1    montelukast (Singulair) 10 MG tablet, Take 1 tablet by mouth Every Night., Disp: 30 tablet, Rfl: 1    ondansetron ODT (ZOFRAN-ODT) 4 MG disintegrating tablet, Place 1 tablet on the tongue Every 6 (Six) Hours As Needed for Nausea or Vomiting., Disp: 20 tablet, Rfl: 0    promethazine-dextromethorphan (PROMETHAZINE-DM) 6.25-15 MG/5ML syrup, Take 5 mL by mouth 4 (Four)  "Times a Day As Needed for Cough., Disp: 90 mL, Rfl: 0    Vortioxetine HBr (Trintellix) 10 MG tablet tablet, Take 1 tablet by mouth Daily With Breakfast., Disp: 30 tablet, Rfl: 2      Social History     Socioeconomic History    Marital status: Single   Tobacco Use    Smoking status: Never    Smokeless tobacco: Never    Tobacco comments:     Patient is advised to quit vaping, nicotine    Vaping Use    Vaping Use: Former   Substance and Sexual Activity    Alcohol use: Not Currently     Comment: rarely/occ    Drug use: Never    Sexual activity: Defer         Review of Systems   Constitutional: Negative for chills and fever.   HENT:  Negative for ear discharge and nosebleeds.    Eyes:  Negative for discharge and redness.   Cardiovascular:  Negative for chest pain, orthopnea, palpitations, paroxysmal nocturnal dyspnea and syncope.   Respiratory:  Positive for shortness of breath. Negative for cough and wheezing.    Endocrine: Negative for heat intolerance.   Skin:  Negative for rash.   Musculoskeletal:  Negative for arthritis and myalgias.   Gastrointestinal:  Negative for abdominal pain, melena, nausea and vomiting.   Genitourinary:  Negative for dysuria and hematuria.   Neurological:  Negative for dizziness, light-headedness, numbness and tremors.   Psychiatric/Behavioral:  Negative for depression. The patient is not nervous/anxious.        Procedures      ECG 12 Lead    Date/Time: 12/7/2023 2:00 PM  Performed by: Chang Sherman MD    Authorized by: Chang Sherman MD  Comparison: compared with previous ECG   Similar to previous ECG  Rhythm: sinus rhythm  Other findings: non-specific ST-T wave changes    Clinical impression: normal ECG          ECG 12 Lead    (Results Pending)           Objective:    /82 (BP Location: Right arm, Patient Position: Sitting, Cuff Size: Large Adult)   Pulse 99   Resp 18   Ht 175.3 cm (69.02\")   Wt (!) 141 kg (311 lb)   SpO2 98%   BMI 45.91 kg/m²         Constitutional:       " Appearance: Well-developed.   Eyes:      General: No scleral icterus.        Right eye: No discharge.   HENT:      Head: Normocephalic and atraumatic.   Neck:      Thyroid: No thyromegaly.      Lymphadenopathy: No cervical adenopathy.   Pulmonary:      Effort: Pulmonary effort is normal. No respiratory distress.      Breath sounds: Normal breath sounds. No wheezing. No rales.   Cardiovascular:      Normal rate. Regular rhythm.      No gallop.    Edema:     Peripheral edema absent.   Abdominal:      Tenderness: There is no abdominal tenderness.   Skin:     Findings: No erythema or rash.   Neurological:      Mental Status: Alert and oriented to person, place, and time.             Assessment:       Diagnosis Plan   1. Palpitations  ECG 12 Lead      2. Syncope, unspecified syncope type        3. Anxiety                 Plan:       MDM:    1.  Syncope:    Patient has not had any further syncope episodes.  Recommend observation    2.  Palpitation:    Symptom of palpitations are contained.  Continue current treatment.    3.  Anxiety disorder:    Since the new changes in antianxiety medicine patient is feeling better.

## 2023-12-07 ENCOUNTER — OFFICE VISIT (OUTPATIENT)
Dept: CARDIOLOGY | Facility: CLINIC | Age: 23
End: 2023-12-07
Payer: MEDICAID

## 2023-12-07 VITALS
HEIGHT: 69 IN | OXYGEN SATURATION: 98 % | BODY MASS INDEX: 43.4 KG/M2 | DIASTOLIC BLOOD PRESSURE: 82 MMHG | SYSTOLIC BLOOD PRESSURE: 119 MMHG | HEART RATE: 99 BPM | RESPIRATION RATE: 18 BRPM | WEIGHT: 293 LBS

## 2023-12-07 DIAGNOSIS — F41.9 ANXIETY: ICD-10-CM

## 2023-12-07 DIAGNOSIS — R55 SYNCOPE, UNSPECIFIED SYNCOPE TYPE: ICD-10-CM

## 2023-12-07 DIAGNOSIS — R00.2 PALPITATIONS: Primary | ICD-10-CM

## 2023-12-07 PROCEDURE — 1159F MED LIST DOCD IN RCRD: CPT | Performed by: INTERNAL MEDICINE

## 2023-12-07 PROCEDURE — 99214 OFFICE O/P EST MOD 30 MIN: CPT | Performed by: INTERNAL MEDICINE

## 2023-12-07 PROCEDURE — 93000 ELECTROCARDIOGRAM COMPLETE: CPT | Performed by: INTERNAL MEDICINE

## 2023-12-07 PROCEDURE — 1160F RVW MEDS BY RX/DR IN RCRD: CPT | Performed by: INTERNAL MEDICINE

## 2024-01-13 DIAGNOSIS — F31.81 BIPOLAR II DISORDER: Chronic | ICD-10-CM

## 2024-01-15 RX ORDER — LUMATEPERONE 42 MG/1
CAPSULE ORAL
Qty: 30 CAPSULE | Refills: 1 | OUTPATIENT
Start: 2024-01-15

## 2024-02-19 NOTE — PROGRESS NOTES
"Subjective   Xena Caicedo is a 23 y.o. female who presents today for follow up for psychiatric medication management.     Chief Complaint: bipolar disorder, anxiety, insomnia.     History of Present Illness:     Medication adjustments last visit:   Continue Trintellix, decrease to 5mg.  Continue buspirone 15mg, three times daily.   Continue Caplyta 42mg.   Start trazodone 50-100mg nightly.     She feels like Bipolar disorder is still off and on. She feels like certain things \"set me off\".   She tried to work things out with her mom, but states she won't take responsibility, and it just  made her more upset emotionally. She states she is wanting answers about her childhood, but her mother will not give her honest answers.    We discussed setting up boundaries with her mother.   She states she doesn't have any friends really.   I talked with her about her self-worth and her identity not being found in her mother, and she agreed with this. She has a follow up with [facility] for counseling, but not until April. Will see if there are any cancellations.   Denies any suicidal thoughts.   Will keep medications the same for now.     Patient presents with symptoms and behaviors that are consistent with the following DSM-5 diagnoses:  Bipolar II disorder  2. Generalized anxiety disorder  3. Insomnia   4. PTSD    The following portions of the patient's history were reviewed and updated as appropriate: allergies, current medications, past family history, past medical history, past social history, past surgical history and problem list.    PAST OUTPATIENT TREATMENT  Diagnosis treated:  Affective Disorder, Anxiety/Panic Disorder  Treatment Type:  Medication Management  Prior Psychiatric Medications:  Prozac--made her suicidal and homicidal.   Vraylar--ineffective at 3mg.       Support Groups:  None  Sequelae Of Mental Disorder:  social isolation, family disruption, emotional distress    Interval History  No change.     Side " Effects  None from current medications      Past Medical History:  Past Medical History:   Diagnosis Date    Anxiety     Palpitations        Social History:  Social History     Socioeconomic History    Marital status: Single   Tobacco Use    Smoking status: Never    Smokeless tobacco: Never    Tobacco comments:     Patient is advised to quit vaping, nicotine    Vaping Use    Vaping Use: Former   Substance and Sexual Activity    Alcohol use: Not Currently     Comment: rarely/occ    Drug use: Never    Sexual activity: Defer       Family History:  Family History   Adopted: Yes       Past Surgical History:  History reviewed. No pertinent surgical history.    Problem List:  Patient Active Problem List   Diagnosis    Hyperglycemia    Syncope    Palpitations    Anxiety    Diarrhea    Elevated liver enzymes    Encounter for other contraceptive management    Dizziness    Post traumatic stress disorder (PTSD)    Generalized anxiety disorder    Chronic nasal congestion    Chronic cough    Mold exposure       Allergy:   Allergies   Allergen Reactions    Amoxicillin Hives    Bee Venom Hives    Lavender Oil Hives    Sodium Hypochlorite Hives        Discontinued Medications:  There are no discontinued medications.          Current Medications:   Current Outpatient Medications   Medication Sig Dispense Refill    busPIRone (BUSPAR) 15 MG tablet Take 1 tablet by mouth 3 (Three) Times a Day. 90 tablet 2    fluticasone (FLONASE) 50 MCG/ACT nasal spray 2 sprays into the nostril(s) as directed by provider Daily. 1 mL 8    hydrOXYzine (ATARAX) 50 MG tablet Take 0.5 tablets by mouth 3 (Three) Times a Day As Needed for Itching. 30 tablet 1    ibuprofen (ADVIL,MOTRIN) 200 MG tablet Take 1 tablet by mouth Every 6 (Six) Hours As Needed for Mild Pain.      Lancets (freestyle) lancets Use to check blood glucose twice daily as needed DX: E11.65 100 each 11    Lumateperone Tosylate 42 MG capsule Take 1 capsule by mouth Every Night. 30 capsule 1     montelukast (Singulair) 10 MG tablet Take 1 tablet by mouth Every Night. 30 tablet 1    ondansetron ODT (ZOFRAN-ODT) 4 MG disintegrating tablet Place 1 tablet on the tongue Every 6 (Six) Hours As Needed for Nausea or Vomiting. 20 tablet 0    promethazine-dextromethorphan (PROMETHAZINE-DM) 6.25-15 MG/5ML syrup Take 5 mL by mouth 4 (Four) Times a Day As Needed for Cough. 90 mL 0    traZODone (DESYREL) 50 MG tablet Take 1 tablet nightly for sleep. If still awake in 1 hour, may take an additional tablet. 60 tablet 1    Vortioxetine HBr (TRINTELLIX) 5 MG tablet tablet Take 1 tablet by mouth Daily With Breakfast. 30 tablet 1     No current facility-administered medications for this visit.         Psychological ROS: positive for - anxiety, depression and sleep disturbances  negative for - behavioral disorder, concentration difficulties, decreased libido, disorientation, hallucinations, hostility, irritability, memory difficulties, mood swings, obsessive thoughts, physical abuse, sexual abuse or suicidal ideation      Physical Exam:   Blood pressure 118/81, pulse 87, SpO2 100%.    Mental Status Exam:   Hygiene:   good  Cooperation:  Cooperative  Eye Contact:  Good  Psychomotor Behavior:  Appropriate  Affect:  Appropriate  Mood: depressed  Hopelessness: Denies  Speech:  Normal  Thought Process:  Goal directed and Linear  Thought Content:  Normal  Suicidal:  None  Homicidal:  None  Hallucinations:  None  Delusion:  None  Memory:  Intact  Orientation:  Person, Place, Time and Situation  Reliability:  good  Insight:  Fair  Judgement:  Fair  Impulse Control:  Fair  Physical/Medical Issues:  No      MSE from 1/3/24  reviewed and accepted with necessary changes.     PHQ-9 Depression Screening    Little interest or pleasure in doing things? 2-->more than half the days   Feeling down, depressed, or hopeless? 2-->more than half the days   Trouble falling or staying asleep, or sleeping too much? 2-->more than half the days   Feeling  tired or having little energy? 2-->more than half the days   Poor appetite or overeating? 3-->nearly every day   Feeling bad about yourself - or that you are a failure or have let yourself or your family down? 2-->more than half the days   Trouble concentrating on things, such as reading the newspaper or watching television? 2-->more than half the days   Moving or speaking so slowly that other people could have noticed? Or the opposite - being so fidgety or restless that you have been moving around a lot more than usual? 2-->more than half the days   Thoughts that you would be better off dead, or of hurting yourself in some way? 0-->not at all   PHQ-9 Total Score 17   If you checked off any problems, how difficult have these problems made it for you to do your work, take care of things at home, or get along with other people? somewhat difficult        Current every day smoker less than 3 minutes spent counseling Not agreeable to stopping    I advised Xena of the risks of tobacco use.     Result Review:    Labs:  No visits with results within 3 Month(s) from this visit.   Latest known visit with results is:   Office Visit on 11/17/2023   Component Date Value Ref Range Status    Class Description 11/17/2023 Comment   Final        Levels of Specific IgE       Class  Description of Class      ---------------------------  -----  --------------------                     < 0.10         0         Negative             0.10 -    0.31         0/I       Equivocal/Low             0.32 -    0.55         I         Low             0.56 -    1.40         II        Moderate             1.41 -    3.90         III       High             3.91 -   19.00         IV        Very High            19.01 -  100.00         V         Very High                    >100.00         VI        Very High    Penicillium chrysogen 11/17/2023 <0.10  Class 0 kU/L Final    Cladosporium herbarum 11/17/2023 <0.10  Class 0 kU/L Final    Aspergillus fumigatus  11/17/2023 <0.10  Class 0 kU/L Final    Mucor racemosus 11/17/2023 <0.10  Class 0 kU/L Final    Candida albicans 11/17/2023 <0.10  Class 0 kU/L Final    Alternaria alternata 11/17/2023 <0.10  Class 0 kU/L Final    Setomelanomma rostrat 11/17/2023 <0.10  Class 0 kU/L Final    Fusarium proliferatum 11/17/2023 <0.10  Class 0 kU/L Final    Stemphylium herbarum 11/17/2023 <0.10  Class 0 kU/L Final    Rhizopus nigrican 11/17/2023 <0.10  Class 0 kU/L Final    Aureobasidium pullulan 11/17/2023 <0.10  Class 0 kU/L Final    Phoma betae 11/17/2023 <0.10  Class 0 kU/L Final    Epicoccum purpurascens 11/17/2023 <0.10  Class 0 kU/L Final    Curvularia lunata 11/17/2023 <0.10  Class 0 kU/L Final    Cephalosporium  acremonium 11/17/2023 <0.10  Class 0 kU/L Final       Assessment & Plan   Diagnoses and all orders for this visit:    1. Bipolar II disorder (Primary)    2. Generalized anxiety disorder    3. Post traumatic stress disorder (PTSD)    4. Insomnia due to mental condition      Continue Trintellix, decrease to 5mg.  Continue buspirone 15mg, three times daily.   Continue Caplyta 42mg.   Continue trazodone 50-100mg nightly.       Visit Diagnoses:    ICD-10-CM ICD-9-CM   1. Bipolar II disorder  F31.81 296.89   2. Generalized anxiety disorder  F41.1 300.02   3. Post traumatic stress disorder (PTSD)  F43.10 309.81   4. Insomnia due to mental condition  F51.05 300.9     327.02             TREATMENT PLAN/GOALS: Continue supportive psychotherapy efforts and medications as indicated. Treatment and medication options discussed during today's visit. Patient ackowledged and verbally consented to continue with current treatment plan and was educated on the importance of compliance with treatment and follow-up appointments.    MEDICATION ISSUES:  INSPECT reviewed as expected    Discussed medication options and treatment plan of prescribed medication as well as the risks, benefits, and side effects including potential falls, possible  impaired driving and metabolic adversities among others. Patient is agreeable to call the office with any worsening of symptoms or onset of side effects. Patient is agreeable to call 911 or go to the nearest ER should he/she begin having SI/HI. No medication side effects or related complaints today.     MEDS ORDERED DURING VISIT:  No orders of the defined types were placed in this encounter.      No follow-ups on file.         This document has been electronically signed by CAROLYN Ashley  February 26, 2024 11:16 EST    Part of this note may be an electronic transcription/translation of spoken language to printed text using the Dragon Dictation System.

## 2024-02-21 ENCOUNTER — OFFICE VISIT (OUTPATIENT)
Dept: PSYCHIATRY | Facility: CLINIC | Age: 24
End: 2024-02-21
Payer: MEDICAID

## 2024-02-21 DIAGNOSIS — F41.1 GENERALIZED ANXIETY DISORDER: Primary | ICD-10-CM

## 2024-02-21 DIAGNOSIS — F43.10 POST TRAUMATIC STRESS DISORDER (PTSD): ICD-10-CM

## 2024-02-21 NOTE — PROGRESS NOTES
Date: 2024  Time In: 09  Time Out: 0950      PROGRESS NOTE  Data:  Xena Caicedo is a 23 y.o. female who presents today for individual therapy session at [facility] with SHAHEEN Kemp, ZULEYMA.     Patient Chief Complaint: Follow-up for PTSD and anxiety    Clinical Maneuvering/Intervention: Xena is pleasant, alert and oriented to person place and time.  Her last session was 2023.  She states that she has returned because her mom Keeps bringing up the past.  She describes her mother's interactions as someone who is playing the victim with her parenting role when Xena was a child.  Xena stated that after their father  at age 4 she felt responsible to take care of her siblings; making sure that he had food or was getting dressed or going to school.  She continued this mothering role through her teenage years.  Now, this has caused problems with her siblings as they come to her and blame her for how she raised them.  We discussed how she was a child tried to do the best that she felt like she could do but had no responsibility for.  Also discussed other maladaptive behaviors that her mother has including belittling, devaluing, gas lighting, and blame shifting.    Assisted patient in processing above session content; acknowledged and normalized patient’s thoughts, feelings, and concerns. Rationalized patient thought process regarding setting boundaries with family members; realizing that she was not responsible for her siblings. Discussed triggers associated with patient's anxiety. Also discussed coping skills for patient to implement such as continuing with 7-11 breathing and guided imagery.    Allowed patient to freely discuss issues without interruption or judgment. Provided safe, confidential environment to facilitate the development of positive therapeutic relationship and encourage open, honest communication. Assisted patient in identifying risk factors which  would indicate the need for higher level of care including thoughts to harm self or others and/or self-harming behavior and encouraged patient to contact this office, call 911, or present to the nearest emergency room should any of these events occur. Discussed crisis intervention services and means to access. Patient adamantly and convincingly denies current suicidal or homicidal ideation or perceptual disturbance.    Assessment   Patient appears to maintain relative stability as compared to their baseline. However, patient continues to struggle with anxiety and PTSD which continues to cause impairment in important areas of functioning. A result, they can be reasonably expected to continue to benefit from treatment and would likely be at increased risk for decompensation otherwise.    Mental Status Exam:   Hygiene:   good  Cooperation:  Cooperative  Eye Contact:  Good  Psychomotor Behavior:  Appropriate  Affect:  Appropriate  Mood: anxious  Speech:  Normal  Thought Process:  Goal directed and Linear  Thought Content:  Normal  Suicidal:  None  Homicidal:  None  Hallucinations:  None  Delusion:  None  Memory:  Intact  Orientation:  Person, Place, Time and Situation  Reliability:  good  Insight:  Fair  Judgement:  Fair  Impulse Control:  Fair  Physical/Medical Issues:  No    PHQ-Score Total:  PHQ-9 Total Score: PHQ-9 Depression Screening  Little interest or pleasure in doing things? 2-->more than half the days   Feeling down, depressed, or hopeless? 2-->more than half the days   Trouble falling or staying asleep, or sleeping too much? 2-->more than half the days   Feeling tired or having little energy? 2-->more than half the days   Poor appetite or overeating? 3-->nearly every day   Feeling bad about yourself - or that you are a failure or have let yourself or your family down? 2-->more than half the days   Trouble concentrating on things, such as reading the newspaper or watching television? 2-->more than half the  days   Moving or speaking so slowly that other people could have noticed? Or the opposite - being so fidgety or restless that you have been moving around a lot more than usual? 2-->more than half the days   Thoughts that you would be better off dead, or of hurting yourself in some way? 0-->not at all   PHQ-9 Total Score 17   If you checked off any problems, how difficult have these problems made it for you to do your work, take care of things at home, or get along with other people?        STANISLAW-7 Total Score:   Over the last two weeks, how often have you been bothered by the following problems?  Feeling nervous, anxious or on edge: More than half the days  Not being able to stop or control worrying: Nearly every day  Worrying too much about different things: Nearly every day  Trouble Relaxing: More than half the days  Being so restless that it is hard to sit still: Nearly every day  Becoming easily annoyed or irritable: More than half the days  Feeling afraid as if something awful might happen: More than half the days  STANISLAW 7 Total Score: 17     Patient's Support Network Includes:  Grandmother     Functional Status: Moderate impairment      Progress toward goal: Not at goal     Prognosis: Good with Ongoing Treatment           Plan     Resources: Patient was provided with the following community resources: None at this time    Patient will continue in individual outpatient therapy with focus on improved functioning and coping skills, maintaining stability, and avoiding decompensation and the need for higher level of care.    Patient will adhere to any medication regimens as prescribed and report any side effects. Patient will contact this office, call 911 or present to the nearest emergency room should suicidal or homicidal ideations occur. Provide cognitive behavioral therapy and solution focused therapy to improve functioning, maintain stability and avoid decompensation and the need for higher level of care.      Return at first available appointment or earlier if symptoms worsen or fail to improve.           VISIT DIAGNOSIS:     ICD-10-CM ICD-9-CM   1. Generalized anxiety disorder  F41.1 300.02   2. Post traumatic stress disorder (PTSD)  F43.10 309.81            This document has been electronically signed by SHAHEEN Kemp, MARTHAW   February 21, 2024 09:54 EST      Part of this note may be an electronic transcription/translation of spoken language to printed text using the Dragon Dictation System.

## 2024-02-26 ENCOUNTER — OFFICE VISIT (OUTPATIENT)
Dept: PSYCHIATRY | Facility: CLINIC | Age: 24
End: 2024-02-26
Payer: MEDICAID

## 2024-02-26 VITALS — SYSTOLIC BLOOD PRESSURE: 118 MMHG | OXYGEN SATURATION: 100 % | DIASTOLIC BLOOD PRESSURE: 81 MMHG | HEART RATE: 87 BPM

## 2024-02-26 DIAGNOSIS — F41.1 GENERALIZED ANXIETY DISORDER: Chronic | ICD-10-CM

## 2024-02-26 DIAGNOSIS — F31.81 BIPOLAR II DISORDER: Primary | Chronic | ICD-10-CM

## 2024-02-26 DIAGNOSIS — F43.10 POST TRAUMATIC STRESS DISORDER (PTSD): Chronic | ICD-10-CM

## 2024-02-26 DIAGNOSIS — F51.05 INSOMNIA DUE TO MENTAL CONDITION: Chronic | ICD-10-CM

## 2024-02-26 PROCEDURE — 1160F RVW MEDS BY RX/DR IN RCRD: CPT

## 2024-02-26 PROCEDURE — 1159F MED LIST DOCD IN RCRD: CPT

## 2024-02-26 PROCEDURE — 99214 OFFICE O/P EST MOD 30 MIN: CPT

## 2024-03-04 DIAGNOSIS — F51.05 INSOMNIA DUE TO MENTAL CONDITION: Chronic | ICD-10-CM

## 2024-03-04 RX ORDER — TRAZODONE HYDROCHLORIDE 50 MG/1
TABLET ORAL
Qty: 60 TABLET | Refills: 0 | Status: SHIPPED | OUTPATIENT
Start: 2024-03-04

## 2024-03-14 ENCOUNTER — OFFICE VISIT (OUTPATIENT)
Dept: FAMILY MEDICINE CLINIC | Facility: CLINIC | Age: 24
End: 2024-03-14
Payer: MEDICAID

## 2024-03-14 VITALS
HEART RATE: 86 BPM | BODY MASS INDEX: 43.4 KG/M2 | DIASTOLIC BLOOD PRESSURE: 80 MMHG | OXYGEN SATURATION: 100 % | SYSTOLIC BLOOD PRESSURE: 142 MMHG | WEIGHT: 293 LBS | HEIGHT: 69 IN

## 2024-03-14 DIAGNOSIS — R05.3 CHRONIC COUGH: ICD-10-CM

## 2024-03-14 DIAGNOSIS — F41.1 GENERALIZED ANXIETY DISORDER: ICD-10-CM

## 2024-03-14 DIAGNOSIS — R09.81 CHRONIC NASAL CONGESTION: ICD-10-CM

## 2024-03-14 DIAGNOSIS — Z77.120 MOLD EXPOSURE: Primary | ICD-10-CM

## 2024-03-14 DIAGNOSIS — F43.10 POST TRAUMATIC STRESS DISORDER (PTSD): ICD-10-CM

## 2024-03-14 DIAGNOSIS — R03.0 ELEVATED BLOOD PRESSURE READING WITHOUT DIAGNOSIS OF HYPERTENSION: ICD-10-CM

## 2024-03-14 DIAGNOSIS — F41.1 GENERALIZED ANXIETY DISORDER: Chronic | ICD-10-CM

## 2024-03-14 DIAGNOSIS — Z00.00 PREVENTATIVE HEALTH CARE: ICD-10-CM

## 2024-03-14 DIAGNOSIS — M25.473 ANKLE SWELLING, UNSPECIFIED LATERALITY: ICD-10-CM

## 2024-03-14 LAB
DEPRECATED RDW RBC AUTO: 41 FL (ref 37–54)
ERYTHROCYTE [DISTWIDTH] IN BLOOD BY AUTOMATED COUNT: 13.1 % (ref 12.3–15.4)
HBA1C MFR BLD: 5.4 % (ref 4.8–5.6)
HCT VFR BLD AUTO: 38 % (ref 34–46.6)
HGB BLD-MCNC: 12.5 G/DL (ref 12–15.9)
MCH RBC QN AUTO: 28.4 PG (ref 26.6–33)
MCHC RBC AUTO-ENTMCNC: 32.9 G/DL (ref 31.5–35.7)
MCV RBC AUTO: 86.4 FL (ref 79–97)
PLATELET # BLD AUTO: 363 10*3/MM3 (ref 140–450)
PMV BLD AUTO: 12 FL (ref 6–12)
RBC # BLD AUTO: 4.4 10*6/MM3 (ref 3.77–5.28)
WBC NRBC COR # BLD AUTO: 9.11 10*3/MM3 (ref 3.4–10.8)

## 2024-03-14 PROCEDURE — 80061 LIPID PANEL: CPT | Performed by: NURSE PRACTITIONER

## 2024-03-14 PROCEDURE — 86803 HEPATITIS C AB TEST: CPT | Performed by: NURSE PRACTITIONER

## 2024-03-14 PROCEDURE — 80050 GENERAL HEALTH PANEL: CPT | Performed by: NURSE PRACTITIONER

## 2024-03-14 PROCEDURE — 83036 HEMOGLOBIN GLYCOSYLATED A1C: CPT | Performed by: NURSE PRACTITIONER

## 2024-03-14 RX ORDER — MONTELUKAST SODIUM 10 MG/1
10 TABLET ORAL NIGHTLY
Qty: 30 TABLET | Refills: 1 | Status: SHIPPED | OUTPATIENT
Start: 2024-03-14

## 2024-03-14 RX ORDER — VORTIOXETINE 5 MG/1
5 TABLET, FILM COATED ORAL
Qty: 30 TABLET | Refills: 1 | Status: SHIPPED | OUTPATIENT
Start: 2024-03-14

## 2024-03-14 NOTE — PROGRESS NOTES
Venipuncture Blood Specimen Collection  Venipuncture performed in the right arm by Julia Mckenna MA with good hemostasis. Patient tolerated the procedure well without complications.   03/14/24   Julia Mckenna MA

## 2024-03-14 NOTE — PROGRESS NOTES
"Chief Complaint  Follow-up (Follow up for anxiety medication/discuss letter for support animal ), Elevated Blood Pressure (Has had some episodes of fluctuating blood pressure ), and Edema (Bilateral ankle swelling/trying to elevated when possible at night time )    Subjective        Xena Caicedo presents to [facility]  History of Present Illness    Patient presents with multiple concerns.    Patient has a history of anxiety, PTSD, insomnia and bipolar 2 disorder.  She is prescribed Trintellix, buspirone, Caplyta and trazodone.  Patient is followed by psychiatry.  She is requesting a letter for an emotional support animal due to severe anxiety. Patient is also complaining of fluctuating blood pressure. She is not monitoring blood pressures but is having intermittent headaches, dizziness and feels worn down.  Patient also c/o mild swelling to bilateral ankles.  Patient has a history of \"blackout episodes\" and palpitations.  She has been evaluated by cardiology in the past.  Tilt table test, stress test and echocardiogram have all been completed.  Patient was also evaluated by neurology, brain MRI was negative.  Symptoms were attributed to anxiety.    Hx chronic cough, nasal congestion. Patient has had mold exposure at home. CXR ordered - not completed. Allergen (mold testing ) ordered, negative.  Flonase was started on Singulair was prescribed but patient reports was not ready at pharmacy.      Objective   Vital Signs:  /80 (BP Location: Left arm, Patient Position: Sitting, Cuff Size: Large Adult)   Pulse 86   Ht 175.3 cm (69\")   Wt (!) 146 kg (321 lb)   SpO2 100%   BMI 47.40 kg/m²   Estimated body mass index is 47.4 kg/m² as calculated from the following:    Height as of this encounter: 175.3 cm (69\").    Weight as of this encounter: 146 kg (321 lb).           Physical Exam  Constitutional:       Appearance: Normal appearance. She is obese.   HENT:      Head: Normocephalic. "   Cardiovascular:      Rate and Rhythm: Normal rate and regular rhythm.   Pulmonary:      Effort: Pulmonary effort is normal.      Breath sounds: Normal breath sounds.   Abdominal:      General: Abdomen is flat. Bowel sounds are normal.      Palpations: Abdomen is soft.   Musculoskeletal:         General: Normal range of motion.      Cervical back: Neck supple.      Right lower leg: Edema (trace) present.      Left lower leg: Edema (trace) present.   Skin:     General: Skin is warm and dry.   Neurological:      Mental Status: She is alert and oriented to person, place, and time.      Gait: Gait is intact.   Psychiatric:         Attention and Perception: Attention normal.         Mood and Affect: Mood normal.         Speech: Speech normal.        Result Review :                             Assessment and Plan     Diagnoses and all orders for this visit:    1. Mold exposure (Primary)  Assessment & Plan:  Proceed with CXR  Start Singulair 10 mg nightly  Continue Flonase nasal spray      2. Chronic nasal congestion    3. Chronic cough    4. Generalized anxiety disorder  Assessment & Plan:  Continue medication regimen per psychiatry  Will provide emotional support letter for her animal      5. Preventative health care  -     CBC (No Diff)  -     Comprehensive Metabolic Panel  -     Hemoglobin A1c  -     Hepatitis C Antibody  -     Lipid Panel  -     TSH    6. Elevated blood pressure reading without diagnosis of hypertension  Assessment & Plan:  Increase water consumption, decrease caffeine intake, limit sodium  Labs today  If normal, will start HCTZ as needed for swelling and elevated blood pressure      7. Ankle swelling, unspecified laterality    8. Post traumatic stress disorder (PTSD)    Other orders  -     montelukast (Singulair) 10 MG tablet; Take 1 tablet by mouth Every Night.  Dispense: 30 tablet; Refill: 1           I spent 30 minutes caring for Xena on this date of service. This time includes time spent by me  in the following activities:preparing for the visit, reviewing tests, obtaining and/or reviewing a separately obtained history, performing a medically appropriate examination and/or evaluation , counseling and educating the patient/family/caregiver, ordering medications, tests, or procedures, documenting information in the medical record, and care coordination  Follow Up     Return in about 3 months (around 6/14/2024) for Annual physical.  Patient was given instructions and counseling regarding her condition or for health maintenance advice. Please see specific information pulled into the AVS if appropriate.

## 2024-03-14 NOTE — ASSESSMENT & PLAN NOTE
Increase water consumption, decrease caffeine intake, limit sodium  Labs today  If normal, will start HCTZ as needed for swelling and elevated blood pressure

## 2024-03-15 LAB
ALBUMIN SERPL-MCNC: 4.3 G/DL (ref 3.5–5.2)
ALBUMIN/GLOB SERPL: 1.4 G/DL
ALP SERPL-CCNC: 100 U/L (ref 39–117)
ALT SERPL W P-5'-P-CCNC: 16 U/L (ref 1–33)
ANION GAP SERPL CALCULATED.3IONS-SCNC: 9 MMOL/L (ref 5–15)
AST SERPL-CCNC: 13 U/L (ref 1–32)
BILIRUB SERPL-MCNC: 0.4 MG/DL (ref 0–1.2)
BUN SERPL-MCNC: 7 MG/DL (ref 6–20)
BUN/CREAT SERPL: 8.3 (ref 7–25)
CALCIUM SPEC-SCNC: 9.1 MG/DL (ref 8.6–10.5)
CHLORIDE SERPL-SCNC: 101 MMOL/L (ref 98–107)
CHOLEST SERPL-MCNC: 162 MG/DL (ref 0–200)
CO2 SERPL-SCNC: 29 MMOL/L (ref 22–29)
CREAT SERPL-MCNC: 0.84 MG/DL (ref 0.57–1)
EGFRCR SERPLBLD CKD-EPI 2021: 99 ML/MIN/1.73
GLOBULIN UR ELPH-MCNC: 3.1 GM/DL
GLUCOSE SERPL-MCNC: 124 MG/DL (ref 65–99)
HCV AB SER DONR QL: NORMAL
HDLC SERPL-MCNC: 37 MG/DL (ref 40–60)
LDLC SERPL CALC-MCNC: 104 MG/DL (ref 0–100)
LDLC/HDLC SERPL: 2.76 {RATIO}
POTASSIUM SERPL-SCNC: 4.3 MMOL/L (ref 3.5–5.2)
PROT SERPL-MCNC: 7.4 G/DL (ref 6–8.5)
SODIUM SERPL-SCNC: 139 MMOL/L (ref 136–145)
TRIGL SERPL-MCNC: 115 MG/DL (ref 0–150)
TSH SERPL DL<=0.05 MIU/L-ACNC: 1.99 UIU/ML (ref 0.27–4.2)
VLDLC SERPL-MCNC: 21 MG/DL (ref 5–40)

## 2024-03-16 DIAGNOSIS — F31.81 BIPOLAR II DISORDER: Chronic | ICD-10-CM

## 2024-03-16 RX ORDER — LUMATEPERONE 42 MG/1
1 CAPSULE ORAL NIGHTLY
Qty: 30 CAPSULE | Refills: 1 | Status: SHIPPED | OUTPATIENT
Start: 2024-03-16

## 2024-03-28 RX ORDER — HYDROXYZINE HYDROCHLORIDE 25 MG/1
TABLET, FILM COATED ORAL
Qty: 25 TABLET | Refills: 0 | OUTPATIENT
Start: 2024-03-28

## 2024-04-11 NOTE — PROGRESS NOTES
"Date: April 12, 2024  Time In: 1116  Time Out: 1158      PROGRESS NOTE  Data:  Xena Caicedo is a 23 y.o. female who presents today for individual therapy session at [facility] with SHAHEEN Kemp, ZULEYMA.     Patient Chief Complaint: Follow-up for PTSD and anxiety     Clinical Maneuvering/Intervention: Xena is pleasant, alert and oriented to person place and time.  She states that she is not sleeping well as her mind continues to race.  She had a conflict with her mother and asking her questions about why she appeared to the way she did.  Her mother replied with \"you are just a mistake\" proceeded to tell her that she did not want anything to do with her or her son.  Then several days later tried to call and text.  She talked about her feelings regarding this. Xena has decided that she does not want anything to do with her mother at this time.  She talked about the effects that she feels like that she has with having PTSD depression and anxiety.  We talked through the need to work on all of these things and working through her traumatic experience.    Skills reviewed:   7-11 breathing  Guided imagery   Taking pause before responding to others  Grounding     Assisted patient in processing above session content; acknowledged and normalized patient’s thoughts, feelings, and concerns. Rationalized patient thought process regarding the importance of developing skills and working through trauma. Discussed triggers associated with patient's depression and anxiety. Also discussed coping skills for patient to implement such as continuing with skills already built and adding grounding.    Allowed patient to freely discuss issues without interruption or judgment. Provided safe, confidential environment to facilitate the development of positive therapeutic relationship and encourage open, honest communication. Assisted patient in identifying risk factors which would indicate the need for higher level " of care including thoughts to harm self or others and/or self-harming behavior and encouraged patient to contact this office, call 911, or present to the nearest emergency room should any of these events occur. Discussed crisis intervention services and means to access. Patient adamantly and convincingly denies current suicidal or homicidal ideation or perceptual disturbance.    Assessment   Patient appears to maintain relative stability as compared to their baseline. However, patient continues to struggle with depression and anxiety and PTSD which continues to cause impairment in important areas of functioning. A result, they can be reasonably expected to continue to benefit from treatment and would likely be at increased risk for decompensation otherwise.    Mental Status Exam:   Hygiene:   good  Cooperation:  Cooperative  Eye Contact:  Good  Psychomotor Behavior:  Appropriate  Affect:  Appropriate  Mood: anxious  Speech:  Normal  Thought Process:  Goal directed and Linear  Thought Content:  Normal  Suicidal:  None  Homicidal:  None  Hallucinations:  None  Delusion:  None  Memory:  Intact  Orientation:  Person, Place, Time and Situation  Reliability:  good  Insight:  Fair  Judgement:  Fair  Impulse Control:  Fair  Physical/Medical Issues:  No    PHQ-Score Total:  PHQ-9 Total Score: PHQ-9 Depression Screening  Little interest or pleasure in doing things? 2-->more than half the days   Feeling down, depressed, or hopeless? 2-->more than half the days   Trouble falling or staying asleep, or sleeping too much? 2-->more than half the days   Feeling tired or having little energy? 2-->more than half the days   Poor appetite or overeating? 1-->several days   Feeling bad about yourself - or that you are a failure or have let yourself or your family down? 2-->more than half the days   Trouble concentrating on things, such as reading the newspaper or watching television? 2-->more than half the days   Moving or speaking so  slowly that other people could have noticed? Or the opposite - being so fidgety or restless that you have been moving around a lot more than usual? 2-->more than half the days   Thoughts that you would be better off dead, or of hurting yourself in some way? 0-->not at all   PHQ-9 Total Score 15   If you checked off any problems, how difficult have these problems made it for you to do your work, take care of things at home, or get along with other people?        STANISLAW-7 Total Score:   Over the last two weeks, how often have you been bothered by the following problems?  Feeling nervous, anxious or on edge: More than half the days  Not being able to stop or control worrying: More than half the days  Worrying too much about different things: More than half the days  Trouble Relaxing: Several days  Being so restless that it is hard to sit still: More than half the days  Becoming easily annoyed or irritable: More than half the days  Feeling afraid as if something awful might happen: More than half the days  STANISLAW 7 Total Score: 13     Patient's Support Network Includes:  Grandmother     Functional Status: Moderate impairment      Progress toward goal: Not at goal     Prognosis: Good with Ongoing Treatment             Plan     Resources: Patient was provided with the following community resources: None at this time    Patient will continue in individual outpatient therapy with focus on improved functioning and coping skills, maintaining stability, and avoiding decompensation and the need for higher level of care.    Patient will adhere to any medication regimens as prescribed and report any side effects. Patient will contact this office, call 911 or present to the nearest emergency room should suicidal or homicidal ideations occur. Provide cognitive behavioral therapy and solution focused therapy to improve functioning, maintain stability and avoid decompensation and the need for higher level of care.     Return at first  available appointment or earlier if symptoms worsen or fail to improve.           VISIT DIAGNOSIS:     ICD-10-CM ICD-9-CM   1. Post traumatic stress disorder (PTSD)  F43.10 309.81   2. Generalized anxiety disorder  F41.1 300.02            This document has been electronically signed by SHAHEEN Kemp, MARTHAW   April 12, 2024 12:04 EDT      Part of this note may be an electronic transcription/translation of spoken language to printed text using the Dragon Dictation System.

## 2024-04-12 ENCOUNTER — OFFICE VISIT (OUTPATIENT)
Dept: PSYCHIATRY | Facility: CLINIC | Age: 24
End: 2024-04-12
Payer: MEDICAID

## 2024-04-12 DIAGNOSIS — F43.10 POST TRAUMATIC STRESS DISORDER (PTSD): Primary | ICD-10-CM

## 2024-04-12 DIAGNOSIS — F41.1 GENERALIZED ANXIETY DISORDER: ICD-10-CM

## 2024-04-19 RX ORDER — HYDROXYZINE HYDROCHLORIDE 25 MG/1
TABLET, FILM COATED ORAL
Qty: 25 TABLET | Refills: 0 | OUTPATIENT
Start: 2024-04-19

## 2024-04-23 ENCOUNTER — OFFICE VISIT (OUTPATIENT)
Dept: PSYCHIATRY | Facility: CLINIC | Age: 24
End: 2024-04-23
Payer: MEDICAID

## 2024-04-23 DIAGNOSIS — F41.1 GENERALIZED ANXIETY DISORDER: ICD-10-CM

## 2024-04-23 DIAGNOSIS — F43.10 POST TRAUMATIC STRESS DISORDER (PTSD): Primary | ICD-10-CM

## 2024-04-23 PROCEDURE — 90834 PSYTX W PT 45 MINUTES: CPT | Performed by: SOCIAL WORKER

## 2024-04-23 NOTE — PROGRESS NOTES
Date: April 23, 2024  Time In: 1004  Time Out: 1048      PROGRESS NOTE  Data:  Xena Caicedo is a 23 y.o. female who presents today for individual therapy session at [facility] with SHAHEEN Kemp, ZULEYMA.        Patient Chief Complaint: Follow-up for PTSD and anxiety     Clinical Maneuvering/Intervention: Xena is pleasant, alert and oriented to person place and time.  She talked about conflicts that she has had with her mom.  She talked about her mom being manipulative and that the perception that her mom has about her and her siblings is not the same as hers.  She states that her mother never admits to how she helped raise her own siblings.  We discussed people's perceptions of different things and how they will act on that perception.  She also talked about the grief that she has regarding not having the childhood she should have had.  As a result, she has had friends that she now considers to be family that she tries to help because she feels better about herself knowing that she has helped people who have been in the same situation she has been in.    Assisted patient in processing above session content; acknowledged and normalized patient’s thoughts, feelings, and concerns. Rationalized patient thought process regarding how people will act on their perceptions. Discussed triggers associated with patient's PTSD. Also discussed coping skills for patient to implement such as continuous skills or to build.    Allowed patient to freely discuss issues without interruption or judgment. Provided safe, confidential environment to facilitate the development of positive therapeutic relationship and encourage open, honest communication. Assisted patient in identifying risk factors which would indicate the need for higher level of care including thoughts to harm self or others and/or self-harming behavior and encouraged patient to contact this office, call 911, or present to the nearest emergency  room should any of these events occur. Discussed crisis intervention services and means to access. Patient adamantly and convincingly denies current suicidal or homicidal ideation or perceptual disturbance.    Assessment   Patient appears to maintain relative stability as compared to their baseline. However, patient continues to struggle with PTSD and anxiety which continues to cause impairment in important areas of functioning. A result, they can be reasonably expected to continue to benefit from treatment and would likely be at increased risk for decompensation otherwise.    Mental Status Exam:   Hygiene:   good  Cooperation:  Cooperative  Eye Contact:  Good  Psychomotor Behavior:  Appropriate  Affect:  Appropriate  Mood: anxious  Speech:  Normal  Thought Process:  Goal directed and Linear  Thought Content:  Normal  Suicidal:  None  Homicidal:  None  Hallucinations:  None  Delusion:  None  Memory:  Intact  Orientation:  Person, Place, Time and Situation  Reliability:  good  Insight:  Fair  Judgement:  Fair  Impulse Control:  Fair  Physical/Medical Issues:  No      PHQ-Score Total:  PHQ-9 Total Score: PHQ-9 Depression Screening  Little interest or pleasure in doing things? 2-->more than half the days   Feeling down, depressed, or hopeless? 2-->more than half the days   Trouble falling or staying asleep, or sleeping too much? 2-->more than half the days   Feeling tired or having little energy? 2-->more than half the days   Poor appetite or overeating? 3-->nearly every day   Feeling bad about yourself - or that you are a failure or have let yourself or your family down? 2-->more than half the days   Trouble concentrating on things, such as reading the newspaper or watching television? 2-->more than half the days   Moving or speaking so slowly that other people could have noticed? Or the opposite - being so fidgety or restless that you have been moving around a lot more than usual? 2-->more than half the days    Thoughts that you would be better off dead, or of hurting yourself in some way? 0-->not at all   PHQ-9 Total Score 17   If you checked off any problems, how difficult have these problems made it for you to do your work, take care of things at home, or get along with other people?        STANISLAW-7 Total Score:   Over the last two weeks, how often have you been bothered by the following problems?  Feeling nervous, anxious or on edge: More than half the days  Not being able to stop or control worrying: More than half the days  Worrying too much about different things: Nearly every day  Trouble Relaxing: More than half the days  Being so restless that it is hard to sit still: More than half the days  Becoming easily annoyed or irritable: More than half the days  Feeling afraid as if something awful might happen: More than half the days  STANISLAW 7 Total Score: 15        Patient's Support Network Includes:  Grandmother     Functional Status: Moderate impairment      Progress toward goal: Not at goal     Prognosis: Good with Ongoing Treatment          Plan     Resources: Patient was provided with the following community resources: None at this time     Patient will continue in individual outpatient therapy with focus on improved functioning and coping skills, maintaining stability, and avoiding decompensation and the need for higher level of care.    Patient will adhere to any medication regimens as prescribed and report any side effects. Patient will contact this office, call 911 or present to the nearest emergency room should suicidal or homicidal ideations occur. Provide cognitive behavioral therapy and solution focused therapy to improve functioning, maintain stability and avoid decompensation and the need for higher level of care.     Return at first available appointment or earlier if symptoms worsen or fail to improve.           VISIT DIAGNOSIS:     ICD-10-CM ICD-9-CM   1. Post traumatic stress disorder (PTSD)  F43.10  309.81   2. Generalized anxiety disorder  F41.1 300.02            This document has been electronically signed by SHAHEEN Kemp, MARTHAW   April 23, 2024 10:51 EDT      Part of this note may be an electronic transcription/translation of spoken language to printed text using the Dragon Dictation System.

## 2024-04-26 ENCOUNTER — OFFICE VISIT (OUTPATIENT)
Dept: FAMILY MEDICINE CLINIC | Facility: CLINIC | Age: 24
End: 2024-04-26
Payer: MEDICAID

## 2024-04-26 VITALS
DIASTOLIC BLOOD PRESSURE: 80 MMHG | HEIGHT: 69 IN | TEMPERATURE: 98.2 F | RESPIRATION RATE: 18 BRPM | SYSTOLIC BLOOD PRESSURE: 128 MMHG | WEIGHT: 293 LBS | BODY MASS INDEX: 43.4 KG/M2 | OXYGEN SATURATION: 98 % | HEART RATE: 79 BPM

## 2024-04-26 DIAGNOSIS — R04.0 EPISTAXIS: Primary | ICD-10-CM

## 2024-04-26 DIAGNOSIS — J30.2 SEASONAL ALLERGIES: ICD-10-CM

## 2024-04-26 PROCEDURE — 1159F MED LIST DOCD IN RCRD: CPT | Performed by: NURSE PRACTITIONER

## 2024-04-26 PROCEDURE — 1160F RVW MEDS BY RX/DR IN RCRD: CPT | Performed by: NURSE PRACTITIONER

## 2024-04-26 PROCEDURE — 99213 OFFICE O/P EST LOW 20 MIN: CPT | Performed by: NURSE PRACTITIONER

## 2024-04-26 RX ORDER — CETIRIZINE HYDROCHLORIDE 10 MG/1
10 TABLET ORAL DAILY
Qty: 90 TABLET | Refills: 0 | Status: SHIPPED | OUTPATIENT
Start: 2024-04-26

## 2024-04-26 NOTE — PROGRESS NOTES
"Chief Complaint  Nose Bleed (Random times having nose bleeds. Been going on all week this week. Some dizziness before it starts to bleed but not all the time. Gets a little tired after she has one. Sometimes feels a little nauseated )    Subjective        Xena Caicedo presents to [facility]    Nose Bleed   The bleeding has been from both nares. This is a new problem. The current episode started 1 to 4 weeks ago. The problem occurs daily. The problem has been unchanged. The bleeding is associated with nothing. She has tried pressure for the symptoms. The treatment provided moderate relief. Her past medical history is significant for allergies and sinus problems. There is no history of a bleeding disorder. Currently using Flonase daily. Did not  refilled scrip for singulair. No antihistamine on board.        Objective   Vital Signs:  /80 (BP Location: Left arm, Patient Position: Sitting, Cuff Size: Adult)   Pulse 79   Temp 98.2 °F (36.8 °C)   Resp 18   Ht 175.3 cm (69\")   Wt (!) 146 kg (321 lb 6.4 oz)   SpO2 98%   BMI 47.46 kg/m²   Estimated body mass index is 47.46 kg/m² as calculated from the following:    Height as of this encounter: 175.3 cm (69\").    Weight as of this encounter: 146 kg (321 lb 6.4 oz).               Physical Exam  Constitutional:       General: She is not in acute distress.     Appearance: She is obese.   HENT:      Head: Normocephalic and atraumatic.      Right Ear: Tympanic membrane, ear canal and external ear normal.      Left Ear: Tympanic membrane, ear canal and external ear normal.      Nose: Nose normal. Mucosal edema and congestion present.      Right Nostril: No foreign body or epistaxis.      Left Nostril: No foreign body or epistaxis.      Mouth/Throat:      Mouth: Mucous membranes are moist.      Pharynx: Oropharynx is clear. No posterior oropharyngeal erythema.   Eyes:      Conjunctiva/sclera: Conjunctivae normal.   Cardiovascular: "      Rate and Rhythm: Normal rate and regular rhythm.      Heart sounds: S1 normal and S2 normal.   Pulmonary:      Effort: Pulmonary effort is normal.      Breath sounds: Normal breath sounds.   Musculoskeletal:      Cervical back: Neck supple.   Lymphadenopathy:      Cervical: No cervical adenopathy.   Skin:     General: Skin is warm and dry.      Findings: No rash.   Neurological:      Mental Status: She is alert and oriented to person, place, and time.      Gait: Gait is intact.   Psychiatric:         Mood and Affect: Mood normal.         Thought Content: Thought content normal.        Result Review :      CBC          3/14/2024    14:56   CBC   WBC 9.11    RBC 4.40    Hemoglobin 12.5    Hematocrit 38.0    MCV 86.4    MCH 28.4    MCHC 32.9    RDW 13.1    Platelets 363      CBC w/diff          3/14/2024    14:56   CBC w/Diff   WBC 9.11    RBC 4.40    Hemoglobin 12.5    Hematocrit 38.0    MCV 86.4    MCH 28.4    MCHC 32.9    RDW 13.1    Platelets 363                   Assessment and Plan     Diagnoses and all orders for this visit:    1. Epistaxis (Primary)  Comments:  Recommended holding flonase for now. For any uncontrollable nosebleed, advised Afrin nose spray OTC, tampon, and ER. Otherwise follow up with ENT.  Orders:  -     Ambulatory Referral to ENT (Otolaryngology)    2. Seasonal allergies  Comments:  Staff called pharmacy; singulair ready for patient ; informed.  Orders:  -     cetirizine (zyrTEC) 10 MG tablet; Take 1 tablet by mouth Daily.  Dispense: 90 tablet; Refill: 0             Follow Up     Return if symptoms worsen or fail to improve.  Patient was given instructions and counseling regarding her condition or for health maintenance advice. Please see specific information pulled into the AVS if appropriate.

## 2024-04-29 RX ORDER — HYDROXYZINE HYDROCHLORIDE 25 MG/1
TABLET, FILM COATED ORAL
Qty: 25 TABLET | Refills: 0 | OUTPATIENT
Start: 2024-04-29

## 2024-04-29 NOTE — PROGRESS NOTES
"Subjective   Xena Caicedo is a 23 y.o. female who presents today for follow up for psychiatric medication management.     Chief Complaint: bipolar disorder, anxiety, insomnia.     History of Present Illness:     Medication adjustments last visit:   Continue Trintellix, decrease to 5mg.  Continue buspirone 15mg, three times daily.   Continue Caplyta 42mg.   Continue hydroxyzine 50mg TID PRN anxiety.   Continue trazodone 50-100mg nightly.     She is waking up at 5:45 in the morning. States she doesn't have a good sleep schedule. She is only taking 1 trazodone. I advised her to try taking 2 tablets, 1 hour before she would like to go to sleep. Also advised her on sleep hygiene: limiting screens before bed, etc.   She feels like her mood has been ok.    She is still in counseling.   She has cut off ties with her mother. She was trying to set up some boundaries with her, but states her mom just told her to \"have a nice life.\"   Work is going ok.   Denies any suicidal thoughts.     Patient presents with symptoms and behaviors that are consistent with the following DSM-5 diagnoses:  Bipolar II disorder  2. Generalized anxiety disorder  3. Insomnia   4. PTSD    The following portions of the patient's history were reviewed and updated as appropriate: allergies, current medications, past family history, past medical history, past social history, past surgical history and problem list.    PAST OUTPATIENT TREATMENT  Diagnosis treated:  Affective Disorder, Anxiety/Panic Disorder  Treatment Type:  Medication Management  Prior Psychiatric Medications:  Prozac--made her suicidal and homicidal.   Vraylar--ineffective at 3mg.       Support Groups:  None  Sequelae Of Mental Disorder:  social isolation, family disruption, emotional distress    Interval History  No change.     Side Effects  None from current medications      Past Medical History:  Past Medical History:   Diagnosis Date    Anxiety     Palpitations        Social " History:  Social History     Socioeconomic History    Marital status: Single   Tobacco Use    Smoking status: Never     Passive exposure: Never    Smokeless tobacco: Never    Tobacco comments:     Patient is advised to quit vaping, nicotine    Vaping Use    Vaping status: Every Day   Substance and Sexual Activity    Alcohol use: Yes     Comment: rarely/occ    Drug use: Never    Sexual activity: Defer       Family History:  Family History   Adopted: Yes       Past Surgical History:  History reviewed. No pertinent surgical history.    Problem List:  Patient Active Problem List   Diagnosis    Hyperglycemia    Syncope    Palpitations    Anxiety    Diarrhea    Elevated liver enzymes    Encounter for other contraceptive management    Dizziness    Post traumatic stress disorder (PTSD)    Generalized anxiety disorder    Chronic nasal congestion    Chronic cough    Mold exposure    Preventative health care    Elevated blood pressure reading without diagnosis of hypertension    Ankle swelling       Allergy:   Allergies   Allergen Reactions    Amoxicillin Hives    Bee Venom Hives    Lavender Oil Hives    Sodium Hypochlorite Hives        Discontinued Medications:  Medications Discontinued During This Encounter   Medication Reason    hydrOXYzine (ATARAX) 50 MG tablet Reorder             Current Medications:   Current Outpatient Medications   Medication Sig Dispense Refill    busPIRone (BUSPAR) 15 MG tablet Take 1 tablet by mouth 3 (Three) Times a Day. 90 tablet 2    Caplyta 42 MG capsule TAKE ONE CAPSULE BY MOUTH ONCE NIGHTLY 30 capsule 1    cetirizine (zyrTEC) 10 MG tablet Take 1 tablet by mouth Daily. 90 tablet 0    fluticasone (FLONASE) 50 MCG/ACT nasal spray 2 sprays into the nostril(s) as directed by provider Daily. 1 mL 8    hydrOXYzine (ATARAX) 50 MG tablet Take 1 tablet by mouth 3 (Three) Times a Day As Needed for Anxiety. 90 tablet 2    ibuprofen (ADVIL,MOTRIN) 200 MG tablet Take 1 tablet by mouth Every 6 (Six) Hours As  Needed for Mild Pain.      Lancets (freestyle) lancets Use to check blood glucose twice daily as needed DX: E11.65 100 each 11    montelukast (Singulair) 10 MG tablet Take 1 tablet by mouth Every Night. 30 tablet 1    ondansetron ODT (ZOFRAN-ODT) 4 MG disintegrating tablet Place 1 tablet on the tongue Every 6 (Six) Hours As Needed for Nausea or Vomiting. 20 tablet 0    promethazine-dextromethorphan (PROMETHAZINE-DM) 6.25-15 MG/5ML syrup Take 5 mL by mouth 4 (Four) Times a Day As Needed for Cough. 90 mL 0    traZODone (DESYREL) 50 MG tablet TAKE ONE TABLET BY MOUTH ONCE NIGHTLY FOR SLEEP IF STILL AWAKE IN ONE HOUR MAY TAKE AN ADDITIONAL TABLET 60 tablet 0    Trintellix 5 MG tablet tablet TAKE 1 TABLET BY MOUTH DAILY WITH BREAKFAST 30 tablet 1     No current facility-administered medications for this visit.         Psychological ROS: positive for - anxiety, depression and sleep disturbances  negative for - behavioral disorder, concentration difficulties, decreased libido, disorientation, hallucinations, hostility, irritability, memory difficulties, mood swings, obsessive thoughts, physical abuse, sexual abuse or suicidal ideation      Physical Exam:   Blood pressure 122/74, pulse 97, last menstrual period 03/29/2024, SpO2 98%.    Mental Status Exam:   Hygiene:   good  Cooperation:  Cooperative  Eye Contact:  Good  Psychomotor Behavior:  Appropriate  Affect:  Appropriate  Mood: depressed  Hopelessness: Denies  Speech:  Normal  Thought Process:  Goal directed and Linear  Thought Content:  Normal  Suicidal:  None  Homicidal:  None  Hallucinations:  None  Delusion:  None  Memory:  Intact  Orientation:  Person, Place, Time and Situation  Reliability:  good  Insight:  Fair  Judgement:  Fair  Impulse Control:  Fair  Physical/Medical Issues:  No      MSE from 2/26/24 reviewed and accepted with necessary changes.     PHQ-9 Depression Screening    Little interest or pleasure in doing things? 2-->more than half the days    Feeling down, depressed, or hopeless? 2-->more than half the days   Trouble falling or staying asleep, or sleeping too much? 2-->more than half the days   Feeling tired or having little energy? 2-->more than half the days   Poor appetite or overeating? 2-->more than half the days   Feeling bad about yourself - or that you are a failure or have let yourself or your family down? 0-->not at all   Trouble concentrating on things, such as reading the newspaper or watching television? 2-->more than half the days   Moving or speaking so slowly that other people could have noticed? Or the opposite - being so fidgety or restless that you have been moving around a lot more than usual? 2-->more than half the days   Thoughts that you would be better off dead, or of hurting yourself in some way? 0-->not at all   PHQ-9 Total Score 14   If you checked off any problems, how difficult have these problems made it for you to do your work, take care of things at home, or get along with other people? somewhat difficult        Current every day smoker less than 3 minutes spent counseling Not agreeable to stopping    I advised Xena of the risks of tobacco use.     Result Review:    Labs:  Office Visit on 03/14/2024   Component Date Value Ref Range Status    WBC 03/14/2024 9.11  3.40 - 10.80 10*3/mm3 Final    RBC 03/14/2024 4.40  3.77 - 5.28 10*6/mm3 Final    Hemoglobin 03/14/2024 12.5  12.0 - 15.9 g/dL Final    Hematocrit 03/14/2024 38.0  34.0 - 46.6 % Final    MCV 03/14/2024 86.4  79.0 - 97.0 fL Final    MCH 03/14/2024 28.4  26.6 - 33.0 pg Final    MCHC 03/14/2024 32.9  31.5 - 35.7 g/dL Final    RDW 03/14/2024 13.1  12.3 - 15.4 % Final    RDW-SD 03/14/2024 41.0  37.0 - 54.0 fl Final    MPV 03/14/2024 12.0  6.0 - 12.0 fL Final    Platelets 03/14/2024 363  140 - 450 10*3/mm3 Final    Glucose 03/14/2024 124 (H)  65 - 99 mg/dL Final    BUN 03/14/2024 7  6 - 20 mg/dL Final    Creatinine 03/14/2024 0.84  0.57 - 1.00 mg/dL Final    Sodium  03/14/2024 139  136 - 145 mmol/L Final    Potassium 03/14/2024 4.3  3.5 - 5.2 mmol/L Final    Chloride 03/14/2024 101  98 - 107 mmol/L Final    CO2 03/14/2024 29.0  22.0 - 29.0 mmol/L Final    Calcium 03/14/2024 9.1  8.6 - 10.5 mg/dL Final    Total Protein 03/14/2024 7.4  6.0 - 8.5 g/dL Final    Albumin 03/14/2024 4.3  3.5 - 5.2 g/dL Final    ALT (SGPT) 03/14/2024 16  1 - 33 U/L Final    AST (SGOT) 03/14/2024 13  1 - 32 U/L Final    Alkaline Phosphatase 03/14/2024 100  39 - 117 U/L Final    Total Bilirubin 03/14/2024 0.4  0.0 - 1.2 mg/dL Final    Globulin 03/14/2024 3.1  gm/dL Final    A/G Ratio 03/14/2024 1.4  g/dL Final    BUN/Creatinine Ratio 03/14/2024 8.3  7.0 - 25.0 Final    Anion Gap 03/14/2024 9.0  5.0 - 15.0 mmol/L Final    eGFR 03/14/2024 99.0  >60.0 mL/min/1.73 Final    Hemoglobin A1C 03/14/2024 5.40  4.80 - 5.60 % Final    Hepatitis C Ab 03/14/2024 Non-Reactive  Non-Reactive Final    Total Cholesterol 03/14/2024 162  0 - 200 mg/dL Final    Triglycerides 03/14/2024 115  0 - 150 mg/dL Final    HDL Cholesterol 03/14/2024 37 (L)  40 - 60 mg/dL Final    LDL Cholesterol  03/14/2024 104 (H)  0 - 100 mg/dL Final    VLDL Cholesterol 03/14/2024 21  5 - 40 mg/dL Final    LDL/HDL Ratio 03/14/2024 2.76   Final    TSH 03/14/2024 1.990  0.270 - 4.200 uIU/mL Final       Assessment & Plan   Diagnoses and all orders for this visit:    1. Generalized anxiety disorder  -     hydrOXYzine (ATARAX) 50 MG tablet; Take 1 tablet by mouth 3 (Three) Times a Day As Needed for Anxiety.  Dispense: 90 tablet; Refill: 2    2. Insomnia due to mental condition  -     hydrOXYzine (ATARAX) 50 MG tablet; Take 1 tablet by mouth 3 (Three) Times a Day As Needed for Anxiety.  Dispense: 90 tablet; Refill: 2      Continue Trintellix 5mg.  Continue buspirone 15mg, three times daily.   Continue Caplyta 42mg.   Continue trazodone 50-100mg nightly.   Continue hydroxyzine 50mg TID PRN anxiety.     Visit Diagnoses:    ICD-10-CM ICD-9-CM   1. Generalized  anxiety disorder  F41.1 300.02   2. Insomnia due to mental condition  F51.05 300.9     327.02     TREATMENT PLAN/GOALS: Continue supportive psychotherapy efforts and medications as indicated. Treatment and medication options discussed during today's visit. Patient ackowledged and verbally consented to continue with current treatment plan and was educated on the importance of compliance with treatment and follow-up appointments.    MEDICATION ISSUES:  INSPECT reviewed as expected    Discussed medication options and treatment plan of prescribed medication as well as the risks, benefits, and side effects including potential falls, possible impaired driving and metabolic adversities among others. Patient is agreeable to call the office with any worsening of symptoms or onset of side effects. Patient is agreeable to call 911 or go to the nearest ER should he/she begin having SI/HI. No medication side effects or related complaints today.     MEDS ORDERED DURING VISIT:  New Medications Ordered This Visit   Medications    hydrOXYzine (ATARAX) 50 MG tablet     Sig: Take 1 tablet by mouth 3 (Three) Times a Day As Needed for Anxiety.     Dispense:  90 tablet     Refill:  2       Return in about 3 months (around 7/30/2024).         This document has been electronically signed by CAROLYN Ashley  April 30, 2024 10:36 EDT    Part of this note may be an electronic transcription/translation of spoken language to printed text using the Dragon Dictation System.

## 2024-04-30 ENCOUNTER — OFFICE VISIT (OUTPATIENT)
Dept: PSYCHIATRY | Facility: CLINIC | Age: 24
End: 2024-04-30
Payer: MEDICAID

## 2024-04-30 VITALS — HEART RATE: 97 BPM | OXYGEN SATURATION: 98 % | SYSTOLIC BLOOD PRESSURE: 122 MMHG | DIASTOLIC BLOOD PRESSURE: 74 MMHG

## 2024-04-30 DIAGNOSIS — F43.10 POST TRAUMATIC STRESS DISORDER (PTSD): Chronic | ICD-10-CM

## 2024-04-30 DIAGNOSIS — F41.1 GENERALIZED ANXIETY DISORDER: Chronic | ICD-10-CM

## 2024-04-30 DIAGNOSIS — F31.81 BIPOLAR II DISORDER: Primary | Chronic | ICD-10-CM

## 2024-04-30 DIAGNOSIS — F51.05 INSOMNIA DUE TO MENTAL CONDITION: Chronic | ICD-10-CM

## 2024-04-30 RX ORDER — HYDROXYZINE 50 MG/1
50 TABLET, FILM COATED ORAL 3 TIMES DAILY PRN
Qty: 90 TABLET | Refills: 2 | Status: SHIPPED | OUTPATIENT
Start: 2024-04-30

## 2024-05-07 ENCOUNTER — TELEPHONE (OUTPATIENT)
Dept: FAMILY MEDICINE CLINIC | Facility: CLINIC | Age: 24
End: 2024-05-07
Payer: MEDICAID

## 2024-05-08 ENCOUNTER — TELEPHONE (OUTPATIENT)
Dept: PSYCHIATRY | Facility: CLINIC | Age: 24
End: 2024-05-08
Payer: MEDICAID

## 2024-05-08 NOTE — TELEPHONE ENCOUNTER
[id number] pm  Patient called and left a message stating she had some questions about her medications now that she is pregnant.      I tried calling patient back to see what her questions were. No answer, no VM.

## 2024-05-10 ENCOUNTER — TELEPHONE (OUTPATIENT)
Dept: PSYCHIATRY | Facility: CLINIC | Age: 24
End: 2024-05-10
Payer: MEDICAID

## 2024-05-10 NOTE — TELEPHONE ENCOUNTER
Patient called.  She states she is went to [facility] and found out she is pregnant.  She does not know how far along she is.  She needs to know if she needs to come off of her medications.  She is taking hydroxyzine 50 mg TID, Caplyta 42 mg QD, Trintellix 5 mg daily, trazodone 50 mg QHS.

## 2024-05-15 DIAGNOSIS — F41.1 GENERALIZED ANXIETY DISORDER: Chronic | ICD-10-CM

## 2024-05-15 RX ORDER — VORTIOXETINE 5 MG/1
5 TABLET, FILM COATED ORAL
Qty: 30 TABLET | Refills: 1 | OUTPATIENT
Start: 2024-05-15

## 2024-05-16 LAB
EXTERNAL ABO GROUPING: NORMAL
EXTERNAL ANTIBODY SCREEN: NEGATIVE
EXTERNAL GROUP B STREP ANTIGEN: POSITIVE
EXTERNAL HEPATITIS B SURFACE ANTIGEN: NEGATIVE
EXTERNAL HEPATITIS C AB: NORMAL
EXTERNAL RH FACTOR: POSITIVE
EXTERNAL RUBELLA QUALITATIVE: NORMAL
HIV1 P24 AG SERPL QL IA: NORMAL

## 2024-05-17 ENCOUNTER — TELEPHONE (OUTPATIENT)
Dept: FAMILY MEDICINE CLINIC | Facility: CLINIC | Age: 24
End: 2024-05-17
Payer: MEDICAID

## 2024-05-17 NOTE — TELEPHONE ENCOUNTER
Spoke with Xena Caicedo regarding Xray Chest order from 11/2023.  Stated that she did not have completed. Informed can walk in to hospital and complete at anytime.

## 2024-05-20 NOTE — PROGRESS NOTES
Date: May 21, 2024  Time In: 1404  Time Out:1450        PROGRESS NOTE  Data:  Xena Caicedo is a 23 y.o. female who presents today for individual therapy session at [facility] with SHAHEEN Kemp, ZULEYMA.        Patient Chief Complaint: Follow-up for PTSD and anxiety     Clinical Maneuvering/Intervention: Xena is pleasant, alert and oriented to person place and time.  She is 6 weeks pregnant and will need to go off all psychotropic medications.  She is nervous about this pregnancy and notes that she is not like her mom having multiple fathers for her children.  She talked about the unhealthy aspects of her relationship with her mom and siblings.  She also talked about missing her dad and how she is pushed back the grief of his death.    Assisted patient in processing above session content; acknowledged and normalized patient’s thoughts, feelings, and concerns. Rationalized patient thought process regarding walking through grief in order to come to an acceptance; unhealthy relationships do not have to be accepted.  Discussed triggers associated with patient's PTSD. Also discussed coping skills for patient to implement such as continuous skills or to build.    Allowed patient to freely discuss issues without interruption or judgment. Provided safe, confidential environment to facilitate the development of positive therapeutic relationship and encourage open, honest communication. Assisted patient in identifying risk factors which would indicate the need for higher level of care including thoughts to harm self or others and/or self-harming behavior and encouraged patient to contact this office, call 911, or present to the nearest emergency room should any of these events occur. Discussed crisis intervention services and means to access. Patient adamantly and convincingly denies current suicidal or homicidal ideation or perceptual disturbance.    Assessment   Patient appears to maintain  relative stability as compared to their baseline. However, patient continues to struggle with PTSD and anxiety which continues to cause impairment in important areas of functioning. A result, they can be reasonably expected to continue to benefit from treatment and would likely be at increased risk for decompensation otherwise.    Mental Status Exam:   Hygiene:   good  Cooperation:  Cooperative  Eye Contact:  Good  Psychomotor Behavior:  Appropriate  Affect:  Appropriate  Mood: anxious  Speech:  Normal  Thought Process:  Goal directed and Linear  Thought Content:  Normal  Suicidal:  None  Homicidal:  None  Hallucinations:  None  Delusion:  None  Memory:  Intact  Orientation:  Person, Place, Time and Situation  Reliability:  good  Insight:  Fair  Judgement:  Fair  Impulse Control:  Fair  Physical/Medical Issues:  No      PHQ-Score Total:  PHQ-9 Total Score: PHQ-9 Depression Screening  Little interest or pleasure in doing things? 2-->more than half the days   Feeling down, depressed, or hopeless? 2-->more than half the days   Trouble falling or staying asleep, or sleeping too much? 2-->more than half the days   Feeling tired or having little energy? 2-->more than half the days   Poor appetite or overeating? 2-->more than half the days   Feeling bad about yourself - or that you are a failure or have let yourself or your family down? 2-->more than half the days   Trouble concentrating on things, such as reading the newspaper or watching television? 2-->more than half the days   Moving or speaking so slowly that other people could have noticed? Or the opposite - being so fidgety or restless that you have been moving around a lot more than usual? 2-->more than half the days   Thoughts that you would be better off dead, or of hurting yourself in some way? 0-->not at all   PHQ-9 Total Score 16   If you checked off any problems, how difficult have these problems made it for you to do your work, take care of things at home,  or get along with other people?        STANISLAW-7 Total Score:   Over the last two weeks, how often have you been bothered by the following problems?  Feeling nervous, anxious or on edge: More than half the days  Not being able to stop or control worrying: Nearly every day  Worrying too much about different things: More than half the days  Trouble Relaxing: More than half the days  Being so restless that it is hard to sit still: More than half the days  Becoming easily annoyed or irritable: More than half the days  Feeling afraid as if something awful might happen: More than half the days  STANISLAW 7 Total Score: 15        Patient's Support Network Includes:  Grandmother     Functional Status: Moderate impairment      Progress toward goal: Not at goal     Prognosis: Good with Ongoing Treatment          Plan     Resources: Patient was provided with the following community resources: None at this time     Patient will continue in individual outpatient therapy with focus on improved functioning and coping skills, maintaining stability, and avoiding decompensation and the need for higher level of care.    Patient will adhere to any medication regimens as prescribed and report any side effects. Patient will contact this office, call 911 or present to the nearest emergency room should suicidal or homicidal ideations occur. Provide cognitive behavioral therapy and solution focused therapy to improve functioning, maintain stability and avoid decompensation and the need for higher level of care.     Return at first available appointment or earlier if symptoms worsen or fail to improve.           VISIT DIAGNOSIS:     ICD-10-CM ICD-9-CM   1. Generalized anxiety disorder  F41.1 300.02   2. Post traumatic stress disorder (PTSD)  F43.10 309.81              This document has been electronically signed by SHAHEEN Kemp, ZULEYMA   May 21, 2024 14:58 EDT      Part of this note may be an electronic transcription/translation of spoken language  to printed text using the Dragon Dictation System.

## 2024-05-21 ENCOUNTER — OFFICE VISIT (OUTPATIENT)
Dept: PSYCHIATRY | Facility: CLINIC | Age: 24
End: 2024-05-21
Payer: MEDICAID

## 2024-05-21 DIAGNOSIS — F41.1 GENERALIZED ANXIETY DISORDER: Primary | ICD-10-CM

## 2024-05-21 DIAGNOSIS — F43.10 POST TRAUMATIC STRESS DISORDER (PTSD): ICD-10-CM

## 2024-05-22 DIAGNOSIS — F31.81 BIPOLAR II DISORDER: Chronic | ICD-10-CM

## 2024-05-22 RX ORDER — LUMATEPERONE 42 MG/1
1 CAPSULE ORAL NIGHTLY
Qty: 30 CAPSULE | Refills: 1 | OUTPATIENT
Start: 2024-05-22

## 2024-05-28 ENCOUNTER — HOSPITAL ENCOUNTER (EMERGENCY)
Facility: HOSPITAL | Age: 24
Discharge: HOME | End: 2024-05-28
Admitting: EMERGENCY MEDICINE
Payer: MEDICAID

## 2024-05-28 ENCOUNTER — APPOINTMENT (OUTPATIENT)
Dept: ULTRASOUND IMAGING | Facility: HOSPITAL | Age: 24
End: 2024-05-28
Payer: MEDICAID

## 2024-05-28 VITALS
OXYGEN SATURATION: 100 % | DIASTOLIC BLOOD PRESSURE: 65 MMHG | SYSTOLIC BLOOD PRESSURE: 125 MMHG | HEIGHT: 69 IN | RESPIRATION RATE: 17 BRPM | BODY MASS INDEX: 43.4 KG/M2 | HEART RATE: 81 BPM | WEIGHT: 293 LBS | TEMPERATURE: 98.2 F

## 2024-05-28 DIAGNOSIS — O41.8X10 SUBCHORIONIC HEMATOMA IN FIRST TRIMESTER, SINGLE OR UNSPECIFIED FETUS: Primary | ICD-10-CM

## 2024-05-28 DIAGNOSIS — O46.8X1 SUBCHORIONIC HEMATOMA IN FIRST TRIMESTER, SINGLE OR UNSPECIFIED FETUS: Primary | ICD-10-CM

## 2024-05-28 LAB
ABO GROUP BLD: NORMAL
ALBUMIN SERPL-MCNC: 3.9 G/DL (ref 3.5–5.2)
ALBUMIN/GLOB SERPL: 1.2 G/DL
ALP SERPL-CCNC: 87 U/L (ref 39–117)
ALT SERPL W P-5'-P-CCNC: 15 U/L (ref 1–33)
ANION GAP SERPL CALCULATED.3IONS-SCNC: 7.7 MMOL/L (ref 5–15)
AST SERPL-CCNC: 15 U/L (ref 1–32)
BACTERIA UR QL AUTO: ABNORMAL /HPF
BASOPHILS # BLD AUTO: 0.05 10*3/MM3 (ref 0–0.2)
BASOPHILS NFR BLD AUTO: 0.7 % (ref 0–1.5)
BILIRUB SERPL-MCNC: 0.2 MG/DL (ref 0–1.2)
BILIRUB UR QL STRIP: NEGATIVE
BUN SERPL-MCNC: 9 MG/DL (ref 6–20)
BUN/CREAT SERPL: 13.8 (ref 7–25)
CALCIUM SPEC-SCNC: 9.3 MG/DL (ref 8.6–10.5)
CHLORIDE SERPL-SCNC: 101 MMOL/L (ref 98–107)
CLARITY UR: ABNORMAL
CO2 SERPL-SCNC: 25.3 MMOL/L (ref 22–29)
COLOR UR: YELLOW
CREAT SERPL-MCNC: 0.65 MG/DL (ref 0.57–1)
DEPRECATED RDW RBC AUTO: 41.2 FL (ref 37–54)
EGFRCR SERPLBLD CKD-EPI 2021: 125.5 ML/MIN/1.73
EOSINOPHIL # BLD AUTO: 0.06 10*3/MM3 (ref 0–0.4)
EOSINOPHIL NFR BLD AUTO: 0.8 % (ref 0.3–6.2)
ERYTHROCYTE [DISTWIDTH] IN BLOOD BY AUTOMATED COUNT: 13 % (ref 12.3–15.4)
GLOBULIN UR ELPH-MCNC: 3.2 GM/DL
GLUCOSE SERPL-MCNC: 99 MG/DL (ref 65–99)
GLUCOSE UR STRIP-MCNC: NEGATIVE MG/DL
HCG INTACT+B SERPL-ACNC: NORMAL MIU/ML
HCT VFR BLD AUTO: 34.8 % (ref 34–46.6)
HGB BLD-MCNC: 11.4 G/DL (ref 12–15.9)
HGB UR QL STRIP.AUTO: NEGATIVE
HYALINE CASTS UR QL AUTO: ABNORMAL /LPF
IMM GRANULOCYTES # BLD AUTO: 0.03 10*3/MM3 (ref 0–0.05)
IMM GRANULOCYTES NFR BLD AUTO: 0.4 % (ref 0–0.5)
KETONES UR QL STRIP: ABNORMAL
LEUKOCYTE ESTERASE UR QL STRIP.AUTO: ABNORMAL
LYMPHOCYTES # BLD AUTO: 1.91 10*3/MM3 (ref 0.7–3.1)
LYMPHOCYTES NFR BLD AUTO: 25 % (ref 19.6–45.3)
MCH RBC QN AUTO: 28.4 PG (ref 26.6–33)
MCHC RBC AUTO-ENTMCNC: 32.8 G/DL (ref 31.5–35.7)
MCV RBC AUTO: 86.8 FL (ref 79–97)
MONOCYTES # BLD AUTO: 0.63 10*3/MM3 (ref 0.1–0.9)
MONOCYTES NFR BLD AUTO: 8.2 % (ref 5–12)
NEUTROPHILS NFR BLD AUTO: 4.96 10*3/MM3 (ref 1.7–7)
NEUTROPHILS NFR BLD AUTO: 64.9 % (ref 42.7–76)
NITRITE UR QL STRIP: NEGATIVE
NRBC BLD AUTO-RTO: 0 /100 WBC (ref 0–0.2)
NUMBER OF DOSES: NORMAL
PH UR STRIP.AUTO: 6 [PH] (ref 5–8)
PLATELET # BLD AUTO: 285 10*3/MM3 (ref 140–450)
PMV BLD AUTO: 11.2 FL (ref 6–12)
POTASSIUM SERPL-SCNC: 3.9 MMOL/L (ref 3.5–5.2)
PROT SERPL-MCNC: 7.1 G/DL (ref 6–8.5)
PROT UR QL STRIP: NEGATIVE
RBC # BLD AUTO: 4.01 10*6/MM3 (ref 3.77–5.28)
RBC # UR STRIP: ABNORMAL /HPF
REF LAB TEST METHOD: ABNORMAL
RH BLD: POSITIVE
SODIUM SERPL-SCNC: 134 MMOL/L (ref 136–145)
SP GR UR STRIP: 1.02 (ref 1–1.03)
SQUAMOUS #/AREA URNS HPF: ABNORMAL /HPF
TRANS CELLS #/AREA URNS HPF: ABNORMAL /HPF
UROBILINOGEN UR QL STRIP: ABNORMAL
WBC # UR STRIP: ABNORMAL /HPF
WBC NRBC COR # BLD AUTO: 7.64 10*3/MM3 (ref 3.4–10.8)

## 2024-05-28 PROCEDURE — 81001 URINALYSIS AUTO W/SCOPE: CPT | Performed by: NURSE PRACTITIONER

## 2024-05-28 PROCEDURE — 93976 VASCULAR STUDY: CPT

## 2024-05-28 PROCEDURE — 85025 COMPLETE CBC W/AUTO DIFF WBC: CPT | Performed by: NURSE PRACTITIONER

## 2024-05-28 PROCEDURE — 86901 BLOOD TYPING SEROLOGIC RH(D): CPT | Performed by: NURSE PRACTITIONER

## 2024-05-28 PROCEDURE — 84702 CHORIONIC GONADOTROPIN TEST: CPT | Performed by: NURSE PRACTITIONER

## 2024-05-28 PROCEDURE — 76801 OB US < 14 WKS SINGLE FETUS: CPT

## 2024-05-28 PROCEDURE — 86900 BLOOD TYPING SEROLOGIC ABO: CPT | Performed by: NURSE PRACTITIONER

## 2024-05-28 PROCEDURE — 87147 CULTURE TYPE IMMUNOLOGIC: CPT | Performed by: NURSE PRACTITIONER

## 2024-05-28 PROCEDURE — 76817 TRANSVAGINAL US OBSTETRIC: CPT

## 2024-05-28 PROCEDURE — 99284 EMERGENCY DEPT VISIT MOD MDM: CPT

## 2024-05-28 PROCEDURE — 87086 URINE CULTURE/COLONY COUNT: CPT | Performed by: NURSE PRACTITIONER

## 2024-05-28 PROCEDURE — 80053 COMPREHEN METABOLIC PANEL: CPT | Performed by: NURSE PRACTITIONER

## 2024-05-28 RX ORDER — SODIUM CHLORIDE 0.9 % (FLUSH) 0.9 %
10 SYRINGE (ML) INJECTION AS NEEDED
Status: DISCONTINUED | OUTPATIENT
Start: 2024-05-28 | End: 2024-05-28 | Stop reason: HOSPADM

## 2024-05-28 RX ORDER — CEPHALEXIN 500 MG/1
500 CAPSULE ORAL 2 TIMES DAILY
Qty: 10 CAPSULE | Refills: 0 | Status: SHIPPED | OUTPATIENT
Start: 2024-05-28 | End: 2024-06-02

## 2024-05-28 NOTE — ED PROVIDER NOTES
Subjective   History of Present Illness  Patient is a 23-year-old white female with a history of anxiety presents today with complaints of some vaginal spotting.  She states her last menstrual cycle was .  She states she has had positive home pregnancy test.  She states she urinated this morning and when she wiped she noticed two light pink spots on the toilet paper.  Has had no further episodes.  No abdominal pain or cramping.  Denies any fever or urinary symptoms.  She is .  She denies any unusual vaginal discharge or concern for STI.      Review of Systems   Constitutional:  Negative for fever.   Gastrointestinal:  Negative for abdominal pain.   Genitourinary:  Positive for vaginal bleeding. Negative for dysuria, flank pain, pelvic pain and vaginal discharge.       Past Medical History:   Diagnosis Date    Anxiety     Palpitations        Allergies   Allergen Reactions    Amoxicillin Hives    Bee Venom Hives    Lavender Oil Hives    Sodium Hypochlorite Hives       No past surgical history on file.    Family History   Adopted: Yes       Social History     Socioeconomic History    Marital status: Single   Tobacco Use    Smoking status: Never     Passive exposure: Never    Smokeless tobacco: Never    Tobacco comments:     Patient is advised to quit vaping, nicotine    Vaping Use    Vaping status: Every Day   Substance and Sexual Activity    Alcohol use: Yes     Comment: rarely/occ    Drug use: Never    Sexual activity: Defer           Objective   Physical Exam  Vital signs and triage nurse note reviewed.  Constitutional: Awake, alert; well-developed and well-nourished. No acute distress is noted.  Obese.  Cardiovascular: Regular rate and rhythm, normal S1-S2.  No murmur noted.  Pulmonary: Respiratory effort regular nonlabored, breath sounds clear to auscultation all fields.  Abdomen: Soft, nontender, nondistended with normoactive bowel sounds; no rebound or guarding.  Musculoskeletal: Independent range of  motion of all extremities with no palpable tenderness or edema.   Skin: Flesh tone, warm, dry, intact; no erythematous or petechial rash or lesion.    Procedures           ED Course      Labs Reviewed   COMPREHENSIVE METABOLIC PANEL - Abnormal; Notable for the following components:       Result Value    Sodium 134 (*)     All other components within normal limits    Narrative:     GFR Normal >60  Chronic Kidney Disease <60  Kidney Failure <15     URINALYSIS W/ CULTURE IF INDICATED - Abnormal; Notable for the following components:    Appearance, UA Hazy (*)     Ketones, UA Trace (*)     Leuk Esterase, UA Large (3+) (*)     All other components within normal limits    Narrative:     In absence of clinical symptoms, the presence of pyuria, bacteria, and/or nitrites on the urinalysis result does not correlate with infection.   CBC WITH AUTO DIFFERENTIAL - Abnormal; Notable for the following components:    Hemoglobin 11.4 (*)     All other components within normal limits   URINALYSIS, MICROSCOPIC ONLY - Abnormal; Notable for the following components:    WBC, UA 21-50 (*)     Bacteria, UA Trace (*)     Squamous Epithelial Cells, UA 3-6 (*)     All other components within normal limits   URINE CULTURE   HCG, QUANTITATIVE, PREGNANCY    Narrative:     HCG Ranges by Gestational Age    Females - non-pregnant premenopausal   </= 1mIU/mL HCG  Females - postmenopausal               </= 7mIU/mL HCG    3 Weeks       5.4   -      72 mIU/mL  4 Weeks      10.2   -     708 mIU/mL  5 Weeks       217   -   8,245 mIU/mL  6 Weeks       152   -  32,177 mIU/mL  7 Weeks     4,059   - 153,767 mIU/mL  8 Weeks    31,366   - 149,094 mIU/mL  9 Weeks    59,109   - 135,901 mIU/mL  10 Weeks   44,186   - 170,409 mIU/mL  12 Weeks   27,107   - 201,615 mIU/mL  14 Weeks   24,302   -  93,646 mIU/mL  15 Weeks   12,540   -  69,747 mIU/mL  16 Weeks    8,904   -  55,332 mIU/mL  17 Weeks    8,240   -  51,793 mIU/mL  18 Weeks    9,649   -  55,271 mIU/mL       RHIG EVALUATION   DOSES OF RH IMMUNE GLOBULIN   CBC AND DIFFERENTIAL    Narrative:     The following orders were created for panel order CBC & Differential.  Procedure                               Abnormality         Status                     ---------                               -----------         ------                     CBC Auto Differential[[id number]]        Abnormal            Final result                 Please view results for these tests on the individual orders.     US Ob < 14 Weeks Single or First Gestation    Result Date: 2024  Impression: 1. Small subchorionic hemorrhage, 0.8 x 1.0 cm 2. Single viable intrauterine pregnancy, heart rate 102 bpm. 3. Estimated sonographic gestational age 7 weeks 1 day. Estimated sonographic date of delivery 2025. Electronically Signed: Rupinder Mcmanus MD  2024 4:25 PM EDT  Workstation ID: [id number]    US Ob Transvaginal    Result Date: 2024  Impression: 1. Small subchorionic hemorrhage, 0.8 x 1.0 cm 2. Single viable intrauterine pregnancy, heart rate 102 bpm. 3. Estimated sonographic gestational age 7 weeks 1 day. Estimated sonographic date of delivery 2025. Electronically Signed: Rupinder Mcmanus MD  2024 4:25 PM EDT  Workstation ID: [id number]   Medications   sodium chloride 0.9 % flush 10 mL (has no administration in time range)                                            Medical Decision Making  Patient presents today with the above complaint.    She had the above exam and evaluation.  IV was established.  Labs and ultrasound were obtained.    Workup: CBC and metabolic panel are grossly unremarkable.  Beta-hCG 79,775.  Blood type a positive.  Urinalysis is significant for trace ketones, large leukocytes, 21-50 WBCs, trace bacteria, 3-6 squamous cells.  Ultrasound shows small subchorionic hemorrhage measuring 0.8 x 1.0 cm.  Single viable intrauterine pregnancy heart rate 102 bpm.  Estimated gestational age 7 weeks 1 day.    On  reexamination patient resting quietly no distress.  No new complaints.  She is in no further bleeding.  Abdomen is soft and nontender.  No abdominal or pelvic pain or cramping.  She is hemodynamically stable.  She denies vaginal discharge or concern for STI.  She does have large leukocytes and 21-50 WBCs with trace bacteria on urinalysis.  She denies dysuria frequency or urgency.    Findings were discussed with patient.  She states she scheduled to see her OB/GYN in 2 days.  She will be instructed to keep this appointment.  She will be discharged home today with prescription for Keflex to cover urine until culture results.  She was given warning signs for prompt return to the ED and she voiced understanding.    Problems Addressed:  Subchorionic hematoma in first trimester, single or unspecified fetus: complicated acute illness or injury    Amount and/or Complexity of Data Reviewed  Labs: ordered.  Radiology: ordered.    Risk  Prescription drug management.        Final diagnoses:   Subchorionic hematoma in first trimester, single or unspecified fetus       ED Disposition  ED Disposition       ED Disposition   Discharge    Condition   Stable    Comment   --               Urszula Zaragoza MD  [facility]  [place] [address] [phone]               Medication List        New Prescriptions      cephalexin 500 MG capsule  Commonly known as: KEFLEX  Take 1 capsule by mouth 2 (Two) Times a Day for 5 days.               Where to Get Your Medications        These medications were sent to [facility] [id number] - [facility] [place] - [address] - [phone]  - [phone]  [address] [place] [address]      Phone: [phone]   cephalexin 500 MG capsule            Pratibha Novak APRN  05/28/24 [id number]

## 2024-05-28 NOTE — Clinical Note
[facility] EMERGENCY DEPARTMENT  [address] [place] [address]  Phone: [phone]    Xena Caicedo was seen and treated in our emergency department on 5/28/2024.  She may return to work on 05/31/2024.         Thank you for choosing [facility].    Pratibha Novak APRN

## 2024-05-28 NOTE — ED NOTES
"Pt ambulates to ED staging area 1 accompanied by her grandmother. Pt reports light pink vaginal spotting that started this morning. Pt thinks she is approx 6 weeks pregnant and has an appt with her OB this Thursday. Pt reports that she noticed \"two spots that were light pink\" on the toilet paper after she urinated today. No other bleeding noted. Pt denies chest pain, soa, abdominal pain/ cramping and any issues with urination or bowel movements. Pt reports that she does have morning sickness with nausea and vomiting but is not currently having these symptoms. No other complaints at this time. Pt reports, \"I was just scared because this did not happen with my first child\".  "

## 2024-05-28 NOTE — DISCHARGE INSTRUCTIONS
Take antibiotics as prescribed for your urine.  Awaiting urine culture results.  Drink plenty of fluids.  Pelvic rest.  Keep follow-up appointment with your OB/GYN on Thursday as scheduled.  Return for new or worsening symptoms.  
Patient/Caregiver provided printed discharge information.

## 2024-05-29 LAB — BACTERIA SPEC AEROBE CULT: ABNORMAL

## 2024-05-29 NOTE — PROGRESS NOTES
Patient urine culture resulted with Streptococcus (Group B). Susceptible to Cephalosporins (1st gen). Patient was given Rx for cephalexin. Therapy is appropriate coverage. No further follow-up required.    Microbiology Results (last 10 days)       Procedure Component Value - Date/Time    Urine Culture - Urine, Urine, Clean Catch [[id number]]  (Abnormal) Collected: 05/28/24 1431    Lab Status: Final result Specimen: Urine, Clean Catch Updated: 05/29/24 1035     Urine Culture 25,000 CFU/mL Streptococcus agalactiae (Group B)     Comment:   This organism is considered to be universally susceptible to penicillin.  No further antibiotic testing will be performed.       Narrative:      Colonization of the urinary tract without infection is common. Treatment is discouraged unless the patient is symptomatic, pregnant, or undergoing an invasive urologic procedure.            Tyesha Brooks AMBREEN  5/29/2024 11:12 EDT

## 2024-07-08 ENCOUNTER — OFFICE VISIT (OUTPATIENT)
Dept: PSYCHIATRY | Facility: CLINIC | Age: 24
End: 2024-07-08
Payer: MEDICAID

## 2024-07-08 DIAGNOSIS — F41.1 GENERALIZED ANXIETY DISORDER: ICD-10-CM

## 2024-07-08 DIAGNOSIS — F31.81 BIPOLAR II DISORDER: Primary | ICD-10-CM

## 2024-07-08 PROCEDURE — 90837 PSYTX W PT 60 MINUTES: CPT | Performed by: SOCIAL WORKER

## 2024-07-08 NOTE — PROGRESS NOTES
Date: July 8, 2024  Time In: 1101  Time Out: 1154        PROGRESS NOTE  Data:  Xena Caicedo is a 23 y.o. female who presents today for individual therapy session at [facility] with SHAHEEN Kemp, ZULEYMA.        Patient Chief Complaint: Follow-up for PTSD and anxiety     Clinical Maneuvering/Intervention: Xnea is pleasant, alert and oriented to person place and time.  Last appt was 5/21/24.  She has been frustrated and sad over her situation with her nieces ages 4 and 1.  Her sister has gone down the same road that her mother went down and as result her 4-year-old niece was doing things to help care for the 1-year-old such as fixing her bottle.  She also talked about her dynamics with her mom and the situation there.    Assisted patient in processing above session content; acknowledged and normalized patient’s thoughts, feelings, and concerns. Rationalized patient thought process regarding understanding what she has control over, what she can change, and what she can do to help others.  Also, deciding how much emotional energy to put into relationships with people who are not helping themselves.  Discussed triggers associated with patient's PTSD. Also discussed coping skills for patient to implement continuing with skills already built focusing on skills related to decreasing anxiety.    Allowed patient to freely discuss issues without interruption or judgment. Provided safe, confidential environment to facilitate the development of positive therapeutic relationship and encourage open, honest communication. Assisted patient in identifying risk factors which would indicate the need for higher level of care including thoughts to harm self or others and/or self-harming behavior and encouraged patient to contact this office, call 911, or present to the nearest emergency room should any of these events occur. Discussed crisis intervention services and means to access. Patient adamantly and  convincingly denies current suicidal or homicidal ideation or perceptual disturbance.    Assessment   Patient appears to maintain relative stability as compared to their baseline. However, patient continues to struggle with PTSD and anxiety which continues to cause impairment in important areas of functioning. A result, they can be reasonably expected to continue to benefit from treatment and would likely be at increased risk for decompensation otherwise.    Mental Status Exam:   Hygiene:   good  Cooperation:  Cooperative  Eye Contact:  Good  Psychomotor Behavior:  Appropriate  Affect:  Appropriate  Mood: anxious  Speech:  Normal  Thought Process:  Goal directed and Linear  Thought Content:  Normal  Suicidal:  None  Homicidal:  None  Hallucinations:  None  Delusion:  None  Memory:  Intact  Orientation:  Person, Place, Time and Situation  Reliability:  good  Insight:  Fair  Judgement:  Fair  Impulse Control:  Fair  Physical/Medical Issues:  No      PHQ-Score Total:  PHQ-9 Total Score: PHQ-9 Depression Screening  Little interest or pleasure in doing things? 2-->more than half the days   Feeling down, depressed, or hopeless? 0-->not at all   Trouble falling or staying asleep, or sleeping too much? 2-->more than half the days   Feeling tired or having little energy? 2-->more than half the days   Poor appetite or overeating? 2-->more than half the days   Feeling bad about yourself - or that you are a failure or have let yourself or your family down? 0-->not at all   Trouble concentrating on things, such as reading the newspaper or watching television? 2-->more than half the days   Moving or speaking so slowly that other people could have noticed? Or the opposite - being so fidgety or restless that you have been moving around a lot more than usual? 0-->not at all   Thoughts that you would be better off dead, or of hurting yourself in some way? 0-->not at all   PHQ-9 Total Score 10   If you checked off any problems, how  difficult have these problems made it for you to do your work, take care of things at home, or get along with other people?        STANISLAW-7 Total Score:   Over the last two weeks, how often have you been bothered by the following problems?  Feeling nervous, anxious or on edge: More than half the days  Not being able to stop or control worrying: More than half the days  Worrying too much about different things: More than half the days  Trouble Relaxing: More than half the days  Being so restless that it is hard to sit still: Several days  Becoming easily annoyed or irritable: More than half the days  Feeling afraid as if something awful might happen: More than half the days  STANISLAW 7 Total Score: 13        Patient's Support Network Includes:  Grandmother     Functional Status: Moderate impairment      Progress toward goal: Not at goal     Prognosis: Good with Ongoing Treatment          Plan     Resources: Patient was provided with the following community resources: None at this time     Patient will continue in individual outpatient therapy with focus on improved functioning and coping skills, maintaining stability, and avoiding decompensation and the need for higher level of care.    Patient will adhere to any medication regimens as prescribed and report any side effects. Patient will contact this office, call 911 or present to the nearest emergency room should suicidal or homicidal ideations occur. Provide cognitive behavioral therapy and solution focused therapy to improve functioning, maintain stability and avoid decompensation and the need for higher level of care.     Return at first available appointment or earlier if symptoms worsen or fail to improve.           VISIT DIAGNOSIS:     ICD-10-CM ICD-9-CM   1. Bipolar II disorder  F31.81 296.89   2. Generalized anxiety disorder  F41.1 300.02                This document has been electronically signed by SHAHEEN Kemp, ZULEYMA   July 8, 2024 11:56 EDT      Part of  this note may be an electronic transcription/translation of spoken language to printed text using the Dragon Dictation System.

## 2024-08-03 ENCOUNTER — REFERRAL TRIAGE (OUTPATIENT)
Dept: LABOR AND DELIVERY | Facility: HOSPITAL | Age: 24
End: 2024-08-03
Payer: MEDICAID

## 2024-08-05 ENCOUNTER — PATIENT OUTREACH (OUTPATIENT)
Dept: LABOR AND DELIVERY | Facility: HOSPITAL | Age: 24
End: 2024-08-05
Payer: MEDICAID

## 2024-08-08 ENCOUNTER — PATIENT OUTREACH (OUTPATIENT)
Dept: LABOR AND DELIVERY | Facility: HOSPITAL | Age: 24
End: 2024-08-08
Payer: MEDICAID

## 2024-08-08 NOTE — OUTREACH NOTE
[facility]  Enrollment    Current Estimated Gestational Age: 18w3d    Questions/Answers      Flowsheet Row Responses   Would like to participate? --  [not sure, offered to answer questions , she just said I'll think about it]            Patient states not sure if she wants to participate, offered to answer questions , she just said I'll think about it. Instructed she may call me or message me on [employer], she verbalized understanding    Urszula Dockery RN  Maternity Nurse Navigator    8/8/2024, 14:43 EDT

## 2024-08-13 ENCOUNTER — PATIENT OUTREACH (OUTPATIENT)
Dept: LABOR AND DELIVERY | Facility: HOSPITAL | Age: 24
End: 2024-08-13
Payer: MEDICAID

## 2024-08-13 NOTE — OUTREACH NOTE
[facility]  Enrollment    Current Estimated Gestational Age: 19w1d    Questions/Answers      Flowsheet Row Responses   Would like to participate? No  [not now, states has had a lot going on]          Contact info provided, encouraged to call if she has any questions, concerns, or needs assistance with resources.  DARREN Dockery RN  Maternity Nurse Navigator    8/13/2024, 14:19 EDT

## 2024-08-15 ENCOUNTER — HOSPITAL ENCOUNTER (EMERGENCY)
Facility: HOSPITAL | Age: 24
Discharge: HOME | End: 2024-08-16
Attending: EMERGENCY MEDICINE
Payer: MEDICAID

## 2024-08-15 VITALS
WEIGHT: 293 LBS | OXYGEN SATURATION: 99 % | HEIGHT: 69 IN | TEMPERATURE: 98.1 F | SYSTOLIC BLOOD PRESSURE: 103 MMHG | HEART RATE: 111 BPM | BODY MASS INDEX: 43.4 KG/M2 | DIASTOLIC BLOOD PRESSURE: 56 MMHG | RESPIRATION RATE: 19 BRPM

## 2024-08-15 DIAGNOSIS — Z3A.19 19 WEEKS GESTATION OF PREGNANCY: ICD-10-CM

## 2024-08-15 DIAGNOSIS — R11.2 NAUSEA AND VOMITING, UNSPECIFIED VOMITING TYPE: Primary | ICD-10-CM

## 2024-08-15 LAB
ABO GROUP BLD: NORMAL
ALBUMIN SERPL-MCNC: 3.6 G/DL (ref 3.5–5.2)
ALBUMIN/GLOB SERPL: 1.2 G/DL
ALP SERPL-CCNC: 107 U/L (ref 39–117)
ALT SERPL W P-5'-P-CCNC: 7 U/L (ref 1–33)
ANION GAP SERPL CALCULATED.3IONS-SCNC: 10.3 MMOL/L (ref 5–15)
AST SERPL-CCNC: 13 U/L (ref 1–32)
BASOPHILS # BLD AUTO: 0.03 10*3/MM3 (ref 0–0.2)
BASOPHILS NFR BLD AUTO: 0.3 % (ref 0–1.5)
BILIRUB SERPL-MCNC: 0.3 MG/DL (ref 0–1.2)
BILIRUB UR QL STRIP: NEGATIVE
BUN SERPL-MCNC: 4 MG/DL (ref 6–20)
BUN/CREAT SERPL: 7.1 (ref 7–25)
CALCIUM SPEC-SCNC: 8.9 MG/DL (ref 8.6–10.5)
CHLORIDE SERPL-SCNC: 104 MMOL/L (ref 98–107)
CLARITY UR: ABNORMAL
CO2 SERPL-SCNC: 21.7 MMOL/L (ref 22–29)
COLOR UR: YELLOW
CREAT SERPL-MCNC: 0.56 MG/DL (ref 0.57–1)
DEPRECATED RDW RBC AUTO: 45.2 FL (ref 37–54)
EGFRCR SERPLBLD CKD-EPI 2021: 129.3 ML/MIN/1.73
EOSINOPHIL # BLD AUTO: 0.2 10*3/MM3 (ref 0–0.4)
EOSINOPHIL NFR BLD AUTO: 2.1 % (ref 0.3–6.2)
ERYTHROCYTE [DISTWIDTH] IN BLOOD BY AUTOMATED COUNT: 14.3 % (ref 12.3–15.4)
GLOBULIN UR ELPH-MCNC: 3.1 GM/DL
GLUCOSE SERPL-MCNC: 110 MG/DL (ref 65–99)
GLUCOSE UR STRIP-MCNC: NEGATIVE MG/DL
HCG INTACT+B SERPL-ACNC: NORMAL MIU/ML
HCT VFR BLD AUTO: 33.9 % (ref 34–46.6)
HGB BLD-MCNC: 11.2 G/DL (ref 12–15.9)
HGB UR QL STRIP.AUTO: NEGATIVE
IMM GRANULOCYTES # BLD AUTO: 0.03 10*3/MM3 (ref 0–0.05)
IMM GRANULOCYTES NFR BLD AUTO: 0.3 % (ref 0–0.5)
KETONES UR QL STRIP: ABNORMAL
LEUKOCYTE ESTERASE UR QL STRIP.AUTO: ABNORMAL
LIPASE SERPL-CCNC: 13 U/L (ref 13–60)
LYMPHOCYTES # BLD AUTO: 1.7 10*3/MM3 (ref 0.7–3.1)
LYMPHOCYTES NFR BLD AUTO: 18.1 % (ref 19.6–45.3)
MCH RBC QN AUTO: 28.8 PG (ref 26.6–33)
MCHC RBC AUTO-ENTMCNC: 33 G/DL (ref 31.5–35.7)
MCV RBC AUTO: 87.1 FL (ref 79–97)
MONOCYTES # BLD AUTO: 0.53 10*3/MM3 (ref 0.1–0.9)
MONOCYTES NFR BLD AUTO: 5.6 % (ref 5–12)
NEUTROPHILS NFR BLD AUTO: 6.92 10*3/MM3 (ref 1.7–7)
NEUTROPHILS NFR BLD AUTO: 73.6 % (ref 42.7–76)
NITRITE UR QL STRIP: NEGATIVE
NRBC BLD AUTO-RTO: 0 /100 WBC (ref 0–0.2)
NUMBER OF DOSES: NORMAL
PH UR STRIP.AUTO: 7 [PH] (ref 5–8)
PLATELET # BLD AUTO: 279 10*3/MM3 (ref 140–450)
PMV BLD AUTO: 10.9 FL (ref 6–12)
POTASSIUM SERPL-SCNC: 3.6 MMOL/L (ref 3.5–5.2)
PROT SERPL-MCNC: 6.7 G/DL (ref 6–8.5)
PROT UR QL STRIP: NEGATIVE
RBC # BLD AUTO: 3.89 10*6/MM3 (ref 3.77–5.28)
RH BLD: POSITIVE
SODIUM SERPL-SCNC: 136 MMOL/L (ref 136–145)
SP GR UR STRIP: 1.01 (ref 1–1.03)
UROBILINOGEN UR QL STRIP: ABNORMAL
WBC NRBC COR # BLD AUTO: 9.41 10*3/MM3 (ref 3.4–10.8)

## 2024-08-15 PROCEDURE — 86900 BLOOD TYPING SEROLOGIC ABO: CPT | Performed by: NURSE PRACTITIONER

## 2024-08-15 PROCEDURE — 85025 COMPLETE CBC W/AUTO DIFF WBC: CPT | Performed by: NURSE PRACTITIONER

## 2024-08-15 PROCEDURE — 87086 URINE CULTURE/COLONY COUNT: CPT | Performed by: NURSE PRACTITIONER

## 2024-08-15 PROCEDURE — 99283 EMERGENCY DEPT VISIT LOW MDM: CPT

## 2024-08-15 PROCEDURE — 81001 URINALYSIS AUTO W/SCOPE: CPT | Performed by: NURSE PRACTITIONER

## 2024-08-15 PROCEDURE — 80053 COMPREHEN METABOLIC PANEL: CPT | Performed by: NURSE PRACTITIONER

## 2024-08-15 PROCEDURE — 83690 ASSAY OF LIPASE: CPT | Performed by: NURSE PRACTITIONER

## 2024-08-15 PROCEDURE — 86901 BLOOD TYPING SEROLOGIC RH(D): CPT | Performed by: NURSE PRACTITIONER

## 2024-08-15 PROCEDURE — 84702 CHORIONIC GONADOTROPIN TEST: CPT | Performed by: NURSE PRACTITIONER

## 2024-08-15 RX ORDER — SODIUM CHLORIDE 0.9 % (FLUSH) 0.9 %
10 SYRINGE (ML) INJECTION AS NEEDED
Status: DISCONTINUED | OUTPATIENT
Start: 2024-08-15 | End: 2024-08-16 | Stop reason: HOSPADM

## 2024-08-15 RX ORDER — METOCLOPRAMIDE HYDROCHLORIDE 5 MG/ML
10 INJECTION INTRAMUSCULAR; INTRAVENOUS ONCE
Status: COMPLETED | OUTPATIENT
Start: 2024-08-16 | End: 2024-08-16

## 2024-08-15 NOTE — Clinical Note
[facility] EMERGENCY DEPARTMENT  [address] [place] [address]  Phone: [phone]    Xena Caicedo was seen and treated in our emergency department on 8/15/2024.  She may return to work on 08/19/2024.         Thank you for choosing [facility].    Bobo Ochoa MD

## 2024-08-16 LAB
BACTERIA UR QL AUTO: ABNORMAL /HPF
FLUAV SUBTYP SPEC NAA+PROBE: NOT DETECTED
FLUBV RNA ISLT QL NAA+PROBE: NOT DETECTED
HYALINE CASTS UR QL AUTO: ABNORMAL /LPF
RBC # UR STRIP: ABNORMAL /HPF
REF LAB TEST METHOD: ABNORMAL
SARS-COV-2 RNA RESP QL NAA+PROBE: NOT DETECTED
SQUAMOUS #/AREA URNS HPF: ABNORMAL /HPF
WBC # UR STRIP: ABNORMAL /HPF

## 2024-08-16 PROCEDURE — 87636 SARSCOV2 & INF A&B AMP PRB: CPT | Performed by: EMERGENCY MEDICINE

## 2024-08-16 PROCEDURE — 96365 THER/PROPH/DIAG IV INF INIT: CPT

## 2024-08-16 PROCEDURE — 25010000002 CEFTRIAXONE PER 250 MG: Performed by: EMERGENCY MEDICINE

## 2024-08-16 PROCEDURE — 25010000002 METOCLOPRAMIDE PER 10 MG: Performed by: EMERGENCY MEDICINE

## 2024-08-16 PROCEDURE — 96375 TX/PRO/DX INJ NEW DRUG ADDON: CPT

## 2024-08-16 PROCEDURE — 25810000003 LACTATED RINGERS SOLUTION: Performed by: EMERGENCY MEDICINE

## 2024-08-16 RX ORDER — CEFPODOXIME PROXETIL 200 MG/1
200 TABLET, FILM COATED ORAL EVERY 12 HOURS
Qty: 10 TABLET | Refills: 0 | Status: SHIPPED | OUTPATIENT
Start: 2024-08-16

## 2024-08-16 RX ADMIN — SODIUM CHLORIDE, POTASSIUM CHLORIDE, SODIUM LACTATE AND CALCIUM CHLORIDE 1000 ML: 600; 310; 30; 20 INJECTION, SOLUTION INTRAVENOUS at 00:26

## 2024-08-16 RX ADMIN — CEFTRIAXONE 2000 MG: 2 INJECTION, POWDER, FOR SOLUTION INTRAMUSCULAR; INTRAVENOUS at 01:08

## 2024-08-16 RX ADMIN — METOCLOPRAMIDE 10 MG: 5 INJECTION, SOLUTION INTRAMUSCULAR; INTRAVENOUS at 00:27

## 2024-08-16 NOTE — ED PROVIDER NOTES
Subjective     Provider in Triage Note  EDC: Jan 13, 2025  approx 18weeks 4 days.     OB:  Dr. Packeralexandre Zaragoza    Today has minor abd pain and had an episode of vomiting today.  Is now scared to eat anything and is unsure what she can take for her discomfort.    Denies any fever  Denies urinary symptoms    Takes prenatals daily      Due to significant overcrowding in the emergency department patient was initially seen and evaluated in triage.  Provider in triage recommended patient placement in the treatment area to initiate therapy and movement to an ER bed as soon as possible.    History of Present Illness  Prior triage note reviewed    History of present illness is a 24-year-old female about 19 weeks pregnant who states she has had 3 history of some vomiting she is able to take some liquids and without vomiting but no solids.  She has some mild abdominal pain but no vaginal bleeding.  No dysuria or frequency no chest pain neck arm or jaw pain no ill exposures foreign travels antibiotic use or recent hospitalization.  No discharge or STD risk.  Patient states this started after eating at a steak house.  But no one else in the home is sick.  Nothing seem to trigger make it better or worse.  She feels like she is able to take fluids currently.  Has been taking fluids at home.      Review of Systems   Constitutional:  Negative for chills and fever.   Eyes:  Negative for photophobia and visual disturbance.   Respiratory:  Negative for cough, chest tightness and shortness of breath.    Cardiovascular:  Negative for chest pain.   Gastrointestinal:  Positive for abdominal pain and vomiting. Negative for blood in stool and diarrhea.   Genitourinary:  Negative for difficulty urinating, dysuria, vaginal bleeding and vaginal discharge.   Musculoskeletal:  Negative for back pain and neck pain.   Skin:  Negative for rash.   Neurological:  Negative for headaches.   Psychiatric/Behavioral:  Negative for confusion.        Past  Medical History:   Diagnosis Date    Anxiety     Palpitations        Allergies   Allergen Reactions    Amoxicillin Hives    Bee Venom Hives    Lavender Oil Hives    Sodium Hypochlorite Hives       No past surgical history on file.    Family History   Adopted: Yes       Social History     Socioeconomic History    Marital status: Single   Tobacco Use    Smoking status: Never     Passive exposure: Never    Smokeless tobacco: Never    Tobacco comments:     Patient is advised to quit vaping, nicotine    Vaping Use    Vaping status: Every Day   Substance and Sexual Activity    Alcohol use: Yes     Comment: rarely/occ    Drug use: Never    Sexual activity: Defer     Prior to Admission medications    Medication Sig Start Date End Date Taking? Authorizing Provider   busPIRone (BUSPAR) 15 MG tablet Take 1 tablet by mouth 3 (Three) Times a Day. 10/31/23   Ijeoma Teresa APRN   Caplyta 42 MG capsule TAKE ONE CAPSULE BY MOUTH ONCE NIGHTLY 3/16/24   Ijeoma Teresa APRN   cefpodoxime (VANTIN) 200 MG tablet Take 1 tablet by mouth Every 12 (Twelve) Hours. 8/16/24   Bobo Ochoa MD   cetirizine (zyrTEC) 10 MG tablet Take 1 tablet by mouth Daily. 4/26/24   Shaina Orona APRN   fluticasone (FLONASE) 50 MCG/ACT nasal spray 2 sprays into the nostril(s) as directed by provider Daily. 11/17/23   Katie Espino APRN   hydrOXYzine (ATARAX) 50 MG tablet Take 1 tablet by mouth 3 (Three) Times a Day As Needed for Anxiety. 4/30/24   Ijeoma Teresa APRN   ibuprofen (ADVIL,MOTRIN) 200 MG tablet Take 1 tablet by mouth Every 6 (Six) Hours As Needed for Mild Pain.    Provider, MD Roger   Lancets (freestyle) lancets Use to check blood glucose twice daily as needed DX: E11.65 12/14/22   Katie Espino APRN   montelukast (Singulair) 10 MG tablet Take 1 tablet by mouth Every Night. 3/14/24   Katie Espino APRN   ondansetron ODT (ZOFRAN-ODT) 4 MG disintegrating tablet Place 1 tablet on the  tongue Every 6 (Six) Hours As Needed for Nausea or Vomiting. 4/23/23   Heath Gao APRN   promethazine-dextromethorphan (PROMETHAZINE-DM) 6.25-15 MG/5ML syrup Take 5 mL by mouth 4 (Four) Times a Day As Needed for Cough. 4/23/23   Heath Gao APRN   traZODone (DESYREL) 50 MG tablet TAKE ONE TABLET BY MOUTH ONCE NIGHTLY FOR SLEEP IF STILL AWAKE IN ONE HOUR MAY TAKE AN ADDITIONAL TABLET 3/4/24   Vanessa Hodgson DNP, APRN   Trintellix 5 MG tablet tablet TAKE 1 TABLET BY MOUTH DAILY WITH BREAKFAST 3/14/24   Ijeoma Teresa APRN          Objective   Physical Exam  Constitutional is a 24-year-old awake alert no distress well-appearing otherwise.  Triage vital signs reviewed HEENT extraocular muscles are intact pupils equal round react there is no photophobia or papilledema mouth clear neck supple no adenopathy no JVD no bruits no meningeal signs lungs clear no retraction no use of accessories no CVA tenderness heart is regular without murmur rub abdomen soft nontender nondistended good bowel sounds no peritoneal findings or pulsatile masses extremities no edema cords or Homans' sign no evidence of DVT skin is warm and dry without rashes or cellulitic changes neurologic awake alert orientated x 4 no facial asymmetry speech normal without focal weakness she walks without difficulty no ataxia  Procedures           ED Course      Results for orders placed or performed during the hospital encounter of 08/15/24   COVID-19 and FLU A/B PCR, 1 HR TAT - Swab, Nasopharynx    Specimen: Nasopharynx; Swab   Result Value Ref Range    COVID19 Not Detected Not Detected - Ref. Range    Influenza A PCR Not Detected Not Detected    Influenza B PCR Not Detected Not Detected   Comprehensive Metabolic Panel    Specimen: Blood   Result Value Ref Range    Glucose 110 (H) 65 - 99 mg/dL    BUN 4 (L) 6 - 20 mg/dL    Creatinine 0.56 (L) 0.57 - 1.00 mg/dL    Sodium 136 136 - 145 mmol/L    Potassium 3.6 3.5 - 5.2 mmol/L     Chloride 104 98 - 107 mmol/L    CO2 21.7 (L) 22.0 - 29.0 mmol/L    Calcium 8.9 8.6 - 10.5 mg/dL    Total Protein 6.7 6.0 - 8.5 g/dL    Albumin 3.6 3.5 - 5.2 g/dL    ALT (SGPT) 7 1 - 33 U/L    AST (SGOT) 13 1 - 32 U/L    Alkaline Phosphatase 107 39 - 117 U/L    Total Bilirubin 0.3 0.0 - 1.2 mg/dL    Globulin 3.1 gm/dL    A/G Ratio 1.2 g/dL    BUN/Creatinine Ratio 7.1 7.0 - 25.0    Anion Gap 10.3 5.0 - 15.0 mmol/L    eGFR 129.3 >60.0 mL/min/1.73   Lipase    Specimen: Blood   Result Value Ref Range    Lipase 13 13 - 60 U/L   Urinalysis With Culture If Indicated - Urine, Clean Catch    Specimen: Urine, Clean Catch   Result Value Ref Range    Color, UA Yellow Yellow, Straw    Appearance, UA Cloudy (A) Clear    pH, UA 7.0 5.0 - 8.0    Specific Gravity, UA 1.015 1.005 - 1.030    Glucose, UA Negative Negative    Ketones, UA Trace (A) Negative    Bilirubin, UA Negative Negative    Blood, UA Negative Negative    Protein, UA Negative Negative    Leuk Esterase, UA Large (3+) (A) Negative    Nitrite, UA Negative Negative    Urobilinogen, UA 2.0 E.U./dL (A) 0.2 - 1.0 E.U./dL   CBC Auto Differential    Specimen: Blood   Result Value Ref Range    WBC 9.41 3.40 - 10.80 10*3/mm3    RBC 3.89 3.77 - 5.28 10*6/mm3    Hemoglobin 11.2 (L) 12.0 - 15.9 g/dL    Hematocrit 33.9 (L) 34.0 - 46.6 %    MCV 87.1 79.0 - 97.0 fL    MCH 28.8 26.6 - 33.0 pg    MCHC 33.0 31.5 - 35.7 g/dL    RDW 14.3 12.3 - 15.4 %    RDW-SD 45.2 37.0 - 54.0 fl    MPV 10.9 6.0 - 12.0 fL    Platelets 279 140 - 450 10*3/mm3    Neutrophil % 73.6 42.7 - 76.0 %    Lymphocyte % 18.1 (L) 19.6 - 45.3 %    Monocyte % 5.6 5.0 - 12.0 %    Eosinophil % 2.1 0.3 - 6.2 %    Basophil % 0.3 0.0 - 1.5 %    Immature Grans % 0.3 0.0 - 0.5 %    Neutrophils, Absolute 6.92 1.70 - 7.00 10*3/mm3    Lymphocytes, Absolute 1.70 0.70 - 3.10 10*3/mm3    Monocytes, Absolute 0.53 0.10 - 0.90 10*3/mm3    Eosinophils, Absolute 0.20 0.00 - 0.40 10*3/mm3    Basophils, Absolute 0.03 0.00 - 0.20 10*3/mm3     Immature Grans, Absolute 0.03 0.00 - 0.05 10*3/mm3    nRBC 0.0 0.0 - 0.2 /100 WBC   hCG, Quantitative, Pregnancy    Specimen: Blood   Result Value Ref Range    HCG Quantitative 28,792.00 mIU/mL   Urinalysis, Microscopic Only - Urine, Clean Catch    Specimen: Urine, Clean Catch   Result Value Ref Range    RBC, UA 0-2 None Seen, 0-2 /HPF    WBC, UA 11-20 (A) None Seen, 0-2 /HPF    Bacteria, UA 4+ (A) None Seen /HPF    Squamous Epithelial Cells, UA 13-20 (A) None Seen, 0-2 /HPF    Hyaline Casts, UA None Seen None Seen /LPF    Methodology Manual Light Microscopy     RhIg Evaluation    Specimen: Blood   Result Value Ref Range    ABO Type O     RH type Positive    Doses of Rh Immune Globulin    Specimen: Blood   Result Value Ref Range    Number of Doses       RhIg is not indicated due to the patient's Rh status     No radiology results for the last day  Medications   lactated ringers bolus 1,000 mL (0 mL Intravenous Stopped 24)   metoclopramide (REGLAN) injection 10 mg (10 mg Intravenous Given 24 0027)   cefTRIAXone (ROCEPHIN) 2,000 mg in sodium chloride 0.9 % 100 mL MBP (0 mg Intravenous Stopped 24)                                            Medical Decision Making  Decision making.  Patient IV established monitor placement review of sinus rhythm fetal heart tones were good when dopplered.  And normal.  Rate in 130s.  Labs obtained my independent review COVID-19 flu negative comprehensive metabolic profile unremarkable.  CBC was unremarkable hemoglobin 11.2 urinalysis showed large leukocyte plus bacteria 20 white cells there was 13 epithelial cells.  She has no positive blood type the patient repeat exam is resting comfortably feeling better and in no pain.  She had no further vomiting taking p.o. fluids and she desires to go home she needs to get her 5-year-old child home.  On repeat exam her abdomen is soft nontender good bowel sounds no peritoneal findings I do not see any evidence  just acute cholecystitis pancreatitis appendicitis diverticulitis ischemic bowel bowel obstruction miscarriage ectopic pregnancy although not a complete list of all possibilities.  The patient does have looks like could be UTI she was given Rocephin 2 g IV and will be placed on antibiotics at the house as well.  We talked about the findings and what to return for.  She voiced understanding and she was discharged home for outpatient management follow-up stable improved ER course.    Problems Addressed:  19 weeks gestation of pregnancy: complicated acute illness or injury  Nausea and vomiting, unspecified vomiting type: complicated acute illness or injury    Amount and/or Complexity of Data Reviewed  Labs: ordered. Decision-making details documented in ED Course.    Risk  Prescription drug management.        Final diagnoses:   Nausea and vomiting, unspecified vomiting type   19 weeks gestation of pregnancy       ED Disposition  ED Disposition       ED Disposition   Discharge    Condition   Stable    Comment   --               Katie Espino, APRN  [address] [facility] [address]  [address] [place] [address] [place] [address]  [phone]    In 1 day           Medication List        New Prescriptions      cefpodoxime 200 MG tablet  Commonly known as: VANTIN  Take 1 tablet by mouth Every 12 (Twelve) Hours.               Where to Get Your Medications        These medications were sent to [facility] [id number] - [place], [place] - [address] [facility] - [phone]  - [phone] FX  [address] [facility] [place] [address]      Phone: [phone]   cefpodoxime 200 MG tablet            Bobo Ochoa MD  08/16/24 9218

## 2024-08-16 NOTE — DISCHARGE INSTRUCTIONS
Follow-up with your OB/GYN call tomorrow.  Use your Phenergan as needed.  Return for increasing vomiting vomiting blood vaginal bleeding severe abdominal pain no improvement over next 24 hours or any other new or worse problems or concerns return immediately to the ER.  Antibiotic sent to your pharmacy.

## 2024-08-17 LAB — BACTERIA SPEC AEROBE CULT: NORMAL

## 2025-01-06 ENCOUNTER — PREP FOR SURGERY (OUTPATIENT)
Dept: OTHER | Facility: HOSPITAL | Age: 25
End: 2025-01-06
Payer: MEDICAID

## 2025-01-06 RX ORDER — OXYTOCIN/0.9 % SODIUM CHLORIDE 30/500 ML
250 PLASTIC BAG, INJECTION (ML) INTRAVENOUS CONTINUOUS
Status: CANCELLED | OUTPATIENT
Start: 2025-01-06 | End: 2025-01-06

## 2025-01-06 RX ORDER — MISOPROSTOL 200 UG/1
800 TABLET ORAL AS NEEDED
Status: CANCELLED | OUTPATIENT
Start: 2025-01-06

## 2025-01-06 RX ORDER — ONDANSETRON 4 MG/1
4 TABLET, ORALLY DISINTEGRATING ORAL EVERY 6 HOURS PRN
Status: CANCELLED | OUTPATIENT
Start: 2025-01-06

## 2025-01-06 RX ORDER — METHYLERGONOVINE MALEATE 0.2 MG/ML
200 INJECTION INTRAVENOUS ONCE AS NEEDED
Status: CANCELLED | OUTPATIENT
Start: 2025-01-06

## 2025-01-06 RX ORDER — SODIUM CHLORIDE 0.9 % (FLUSH) 0.9 %
10 SYRINGE (ML) INJECTION EVERY 12 HOURS SCHEDULED
Status: CANCELLED | OUTPATIENT
Start: 2025-01-06

## 2025-01-06 RX ORDER — CARBOPROST TROMETHAMINE 250 UG/ML
250 INJECTION, SOLUTION INTRAMUSCULAR AS NEEDED
Status: CANCELLED | OUTPATIENT
Start: 2025-01-06

## 2025-01-06 RX ORDER — LIDOCAINE HYDROCHLORIDE 10 MG/ML
0.5 INJECTION, SOLUTION EPIDURAL; INFILTRATION; INTRACAUDAL; PERINEURAL ONCE AS NEEDED
Status: CANCELLED | OUTPATIENT
Start: 2025-01-06

## 2025-01-06 RX ORDER — ONDANSETRON 2 MG/ML
4 INJECTION INTRAMUSCULAR; INTRAVENOUS EVERY 6 HOURS PRN
Status: CANCELLED | OUTPATIENT
Start: 2025-01-06

## 2025-01-06 RX ORDER — OXYTOCIN/0.9 % SODIUM CHLORIDE 30/500 ML
999 PLASTIC BAG, INJECTION (ML) INTRAVENOUS ONCE
Status: CANCELLED | OUTPATIENT
Start: 2025-01-06 | End: 2025-01-06

## 2025-01-06 RX ORDER — ACETAMINOPHEN 325 MG/1
650 TABLET ORAL EVERY 4 HOURS PRN
Status: CANCELLED | OUTPATIENT
Start: 2025-01-06

## 2025-01-06 RX ORDER — IBUPROFEN 600 MG/1
600 TABLET, FILM COATED ORAL EVERY 6 HOURS PRN
Status: CANCELLED | OUTPATIENT
Start: 2025-01-06

## 2025-01-06 RX ORDER — SODIUM CHLORIDE 0.9 % (FLUSH) 0.9 %
10 SYRINGE (ML) INJECTION AS NEEDED
Status: CANCELLED | OUTPATIENT
Start: 2025-01-06

## 2025-01-06 RX ORDER — SODIUM CHLORIDE 9 MG/ML
40 INJECTION, SOLUTION INTRAVENOUS AS NEEDED
Status: CANCELLED | OUTPATIENT
Start: 2025-01-06

## 2025-01-06 RX ORDER — SODIUM CHLORIDE, SODIUM LACTATE, POTASSIUM CHLORIDE, CALCIUM CHLORIDE 600; 310; 30; 20 MG/100ML; MG/100ML; MG/100ML; MG/100ML
125 INJECTION, SOLUTION INTRAVENOUS CONTINUOUS
Status: CANCELLED | OUTPATIENT
Start: 2025-01-06 | End: 2025-01-09

## 2025-01-07 ENCOUNTER — HOSPITAL ENCOUNTER (INPATIENT)
Facility: HOSPITAL | Age: 25
LOS: 3 days | Discharge: HOME | End: 2025-01-10
Attending: OBSTETRICS & GYNECOLOGY | Admitting: STUDENT IN AN ORGANIZED HEALTH CARE EDUCATION/TRAINING PROGRAM
Payer: MEDICAID

## 2025-01-07 ENCOUNTER — HOSPITAL ENCOUNTER (OUTPATIENT)
Dept: LABOR AND DELIVERY | Facility: HOSPITAL | Age: 25
Discharge: HOME | End: 2025-01-07
Payer: MEDICAID

## 2025-01-07 LAB
ABO GROUP BLD: NORMAL
BASOPHILS # BLD AUTO: 0.04 10*3/MM3 (ref 0–0.2)
BASOPHILS NFR BLD AUTO: 0.3 % (ref 0–1.5)
BLD GP AB SCN SERPL QL: NEGATIVE
DEPRECATED RDW RBC AUTO: 42.9 FL (ref 37–54)
EOSINOPHIL # BLD AUTO: 0.03 10*3/MM3 (ref 0–0.4)
EOSINOPHIL NFR BLD AUTO: 0.3 % (ref 0.3–6.2)
ERYTHROCYTE [DISTWIDTH] IN BLOOD BY AUTOMATED COUNT: 13.9 % (ref 12.3–15.4)
GLUCOSE BLDC GLUCOMTR-MCNC: 108 MG/DL (ref 70–105)
HCT VFR BLD AUTO: 32.9 % (ref 34–46.6)
HGB BLD-MCNC: 10.7 G/DL (ref 12–15.9)
IMM GRANULOCYTES # BLD AUTO: 0.04 10*3/MM3 (ref 0–0.05)
IMM GRANULOCYTES NFR BLD AUTO: 0.3 % (ref 0–0.5)
LYMPHOCYTES # BLD AUTO: 2.17 10*3/MM3 (ref 0.7–3.1)
LYMPHOCYTES NFR BLD AUTO: 18.4 % (ref 19.6–45.3)
MCH RBC QN AUTO: 27.7 PG (ref 26.6–33)
MCHC RBC AUTO-ENTMCNC: 32.5 G/DL (ref 31.5–35.7)
MCV RBC AUTO: 85.2 FL (ref 79–97)
MONOCYTES # BLD AUTO: 0.67 10*3/MM3 (ref 0.1–0.9)
MONOCYTES NFR BLD AUTO: 5.7 % (ref 5–12)
NEUTROPHILS NFR BLD AUTO: 75 % (ref 42.7–76)
NEUTROPHILS NFR BLD AUTO: 8.82 10*3/MM3 (ref 1.7–7)
NRBC BLD AUTO-RTO: 0 /100 WBC (ref 0–0.2)
PLATELET # BLD AUTO: 281 10*3/MM3 (ref 140–450)
PMV BLD AUTO: 12.3 FL (ref 6–12)
RBC # BLD AUTO: 3.86 10*6/MM3 (ref 3.77–5.28)
RH BLD: POSITIVE
T&S EXPIRATION DATE: NORMAL
TREPONEMA PALLIDUM IGG+IGM AB [PRESENCE] IN SERUM OR PLASMA BY IMMUNOASSAY: NORMAL
WBC NRBC COR # BLD AUTO: 11.77 10*3/MM3 (ref 3.4–10.8)

## 2025-01-07 PROCEDURE — 86780 TREPONEMA PALLIDUM: CPT | Performed by: OBSTETRICS & GYNECOLOGY

## 2025-01-07 PROCEDURE — 85025 COMPLETE CBC W/AUTO DIFF WBC: CPT | Performed by: OBSTETRICS & GYNECOLOGY

## 2025-01-07 PROCEDURE — 86900 BLOOD TYPING SEROLOGIC ABO: CPT | Performed by: OBSTETRICS & GYNECOLOGY

## 2025-01-07 PROCEDURE — 3E033VJ INTRODUCTION OF OTHER HORMONE INTO PERIPHERAL VEIN, PERCUTANEOUS APPROACH: ICD-10-PCS | Performed by: OBSTETRICS & GYNECOLOGY

## 2025-01-07 PROCEDURE — 86850 RBC ANTIBODY SCREEN: CPT | Performed by: OBSTETRICS & GYNECOLOGY

## 2025-01-07 PROCEDURE — 82948 REAGENT STRIP/BLOOD GLUCOSE: CPT

## 2025-01-07 PROCEDURE — 86901 BLOOD TYPING SEROLOGIC RH(D): CPT | Performed by: OBSTETRICS & GYNECOLOGY

## 2025-01-07 RX ORDER — ONDANSETRON 4 MG/1
4 TABLET, ORALLY DISINTEGRATING ORAL EVERY 6 HOURS PRN
Status: DISCONTINUED | OUTPATIENT
Start: 2025-01-07 | End: 2025-01-08 | Stop reason: HOSPADM

## 2025-01-07 RX ORDER — ONDANSETRON 2 MG/ML
4 INJECTION INTRAMUSCULAR; INTRAVENOUS EVERY 6 HOURS PRN
Status: DISCONTINUED | OUTPATIENT
Start: 2025-01-07 | End: 2025-01-08 | Stop reason: HOSPADM

## 2025-01-07 RX ORDER — SODIUM CHLORIDE 9 MG/ML
40 INJECTION, SOLUTION INTRAVENOUS AS NEEDED
Status: DISCONTINUED | OUTPATIENT
Start: 2025-01-07 | End: 2025-01-08 | Stop reason: HOSPADM

## 2025-01-07 RX ORDER — CLINDAMYCIN PHOSPHATE 900 MG/50ML
900 INJECTION, SOLUTION INTRAVENOUS EVERY 8 HOURS
Status: DISCONTINUED | OUTPATIENT
Start: 2025-01-08 | End: 2025-01-08 | Stop reason: HOSPADM

## 2025-01-07 RX ORDER — ACETAMINOPHEN 325 MG/1
650 TABLET ORAL EVERY 4 HOURS PRN
Status: DISCONTINUED | OUTPATIENT
Start: 2025-01-07 | End: 2025-01-08 | Stop reason: HOSPADM

## 2025-01-07 RX ORDER — SODIUM CHLORIDE 0.9 % (FLUSH) 0.9 %
10 SYRINGE (ML) INJECTION EVERY 12 HOURS SCHEDULED
Status: DISCONTINUED | OUTPATIENT
Start: 2025-01-07 | End: 2025-01-08 | Stop reason: HOSPADM

## 2025-01-07 RX ORDER — SODIUM CHLORIDE, SODIUM LACTATE, POTASSIUM CHLORIDE, CALCIUM CHLORIDE 600; 310; 30; 20 MG/100ML; MG/100ML; MG/100ML; MG/100ML
125 INJECTION, SOLUTION INTRAVENOUS CONTINUOUS
Status: DISCONTINUED | OUTPATIENT
Start: 2025-01-07 | End: 2025-01-08

## 2025-01-07 RX ORDER — LIDOCAINE HYDROCHLORIDE 10 MG/ML
0.5 INJECTION, SOLUTION EPIDURAL; INFILTRATION; INTRACAUDAL; PERINEURAL ONCE AS NEEDED
Status: COMPLETED | OUTPATIENT
Start: 2025-01-07 | End: 2025-01-08

## 2025-01-07 RX ORDER — SODIUM CHLORIDE 0.9 % (FLUSH) 0.9 %
10 SYRINGE (ML) INJECTION AS NEEDED
Status: DISCONTINUED | OUTPATIENT
Start: 2025-01-07 | End: 2025-01-08 | Stop reason: HOSPADM

## 2025-01-07 RX ADMIN — DINOPROSTONE 10 MG: 10 INSERT VAGINAL at 21:19

## 2025-01-08 ENCOUNTER — ANESTHESIA (OUTPATIENT)
Dept: LABOR AND DELIVERY | Facility: HOSPITAL | Age: 25
End: 2025-01-08
Payer: MEDICAID

## 2025-01-08 ENCOUNTER — ANESTHESIA EVENT (OUTPATIENT)
Dept: LABOR AND DELIVERY | Facility: HOSPITAL | Age: 25
End: 2025-01-08
Payer: MEDICAID

## 2025-01-08 PROBLEM — Z34.90 ENCOUNTER FOR INDUCTION OF LABOR: Status: ACTIVE | Noted: 2025-01-08

## 2025-01-08 PROCEDURE — 25010000002 BUPIVACAINE (PF) 0.5 % SOLUTION: Performed by: NURSE ANESTHETIST, CERTIFIED REGISTERED

## 2025-01-08 PROCEDURE — 25010000002 LIDOCAINE PF 1% 1 % SOLUTION

## 2025-01-08 PROCEDURE — 88307 TISSUE EXAM BY PATHOLOGIST: CPT | Performed by: OBSTETRICS & GYNECOLOGY

## 2025-01-08 PROCEDURE — 25010000002 FENTANYL CITRATE (PF) 100 MCG/2ML SOLUTION: Performed by: NURSE ANESTHETIST, CERTIFIED REGISTERED

## 2025-01-08 PROCEDURE — 25010000002 LIDOCAINE 1% - EPINEPHRINE 1:100000 1 %-1:100000 SOLUTION: Performed by: NURSE ANESTHETIST, CERTIFIED REGISTERED

## 2025-01-08 PROCEDURE — C1755 CATHETER, INTRASPINAL: HCPCS | Performed by: NURSE ANESTHETIST, CERTIFIED REGISTERED

## 2025-01-08 PROCEDURE — 0KQM0ZZ REPAIR PERINEUM MUSCLE, OPEN APPROACH: ICD-10-PCS | Performed by: OBSTETRICS & GYNECOLOGY

## 2025-01-08 PROCEDURE — 25010000002 CLINDAMYCIN PER 300 MG: Performed by: OBSTETRICS & GYNECOLOGY

## 2025-01-08 PROCEDURE — 25010000002 LIDOCAINE PF 2% 2 % SOLUTION: Performed by: NURSE ANESTHETIST, CERTIFIED REGISTERED

## 2025-01-08 RX ORDER — BUPIVACAINE HYDROCHLORIDE 5 MG/ML
INJECTION, SOLUTION EPIDURAL; INTRACAUDAL AS NEEDED
Status: DISCONTINUED | OUTPATIENT
Start: 2025-01-08 | End: 2025-01-08 | Stop reason: SURG

## 2025-01-08 RX ORDER — HYDROCORTISONE ACETATE PRAMOXINE HCL 2.5; 1 G/100G; G/100G
1 CREAM TOPICAL AS NEEDED
Status: DISCONTINUED | OUTPATIENT
Start: 2025-01-08 | End: 2025-01-10 | Stop reason: HOSPADM

## 2025-01-08 RX ORDER — FENTANYL/BUPIVACAINE/NS/PF 2-1250MCG
PLASTIC BAG, INJECTION (ML) INJECTION CONTINUOUS
Status: DISCONTINUED | OUTPATIENT
Start: 2025-01-08 | End: 2025-01-08

## 2025-01-08 RX ORDER — CALCIUM CARBONATE 500 MG/1
2 TABLET, CHEWABLE ORAL 3 TIMES DAILY PRN
Status: DISCONTINUED | OUTPATIENT
Start: 2025-01-08 | End: 2025-01-10 | Stop reason: HOSPADM

## 2025-01-08 RX ORDER — EPHEDRINE SULFATE 5 MG/ML
10 INJECTION INTRAVENOUS
Status: DISCONTINUED | OUTPATIENT
Start: 2025-01-08 | End: 2025-01-08 | Stop reason: HOSPADM

## 2025-01-08 RX ORDER — BUPIVACAINE HYDROCHLORIDE 5 MG/ML
INJECTION, SOLUTION EPIDURAL; INTRACAUDAL
Status: COMPLETED
Start: 2025-01-08 | End: 2025-01-08

## 2025-01-08 RX ORDER — SODIUM CHLORIDE 0.9 % (FLUSH) 0.9 %
1-10 SYRINGE (ML) INJECTION AS NEEDED
Status: DISCONTINUED | OUTPATIENT
Start: 2025-01-08 | End: 2025-01-08

## 2025-01-08 RX ORDER — ONDANSETRON 4 MG/1
4 TABLET, ORALLY DISINTEGRATING ORAL EVERY 6 HOURS PRN
Status: DISCONTINUED | OUTPATIENT
Start: 2025-01-08 | End: 2025-01-08 | Stop reason: SDUPTHER

## 2025-01-08 RX ORDER — ACETAMINOPHEN 325 MG/1
650 TABLET ORAL EVERY 6 HOURS PRN
Status: DISCONTINUED | OUTPATIENT
Start: 2025-01-08 | End: 2025-01-10 | Stop reason: HOSPADM

## 2025-01-08 RX ORDER — METHYLERGONOVINE MALEATE 0.2 MG/ML
200 INJECTION INTRAVENOUS ONCE AS NEEDED
Status: DISCONTINUED | OUTPATIENT
Start: 2025-01-08 | End: 2025-01-08 | Stop reason: HOSPADM

## 2025-01-08 RX ORDER — LIDOCAINE HYDROCHLORIDE AND EPINEPHRINE 10; 10 MG/ML; UG/ML
INJECTION, SOLUTION INFILTRATION; PERINEURAL AS NEEDED
Status: DISCONTINUED | OUTPATIENT
Start: 2025-01-08 | End: 2025-01-08 | Stop reason: SURG

## 2025-01-08 RX ORDER — MISOPROSTOL 200 UG/1
800 TABLET ORAL AS NEEDED
Status: DISCONTINUED | OUTPATIENT
Start: 2025-01-08 | End: 2025-01-08 | Stop reason: HOSPADM

## 2025-01-08 RX ORDER — FENTANYL CITRATE 50 UG/ML
INJECTION, SOLUTION INTRAMUSCULAR; INTRAVENOUS AS NEEDED
Status: DISCONTINUED | OUTPATIENT
Start: 2025-01-08 | End: 2025-01-08 | Stop reason: SURG

## 2025-01-08 RX ORDER — OXYTOCIN/0.9 % SODIUM CHLORIDE 30/500 ML
2-20 PLASTIC BAG, INJECTION (ML) INTRAVENOUS
Status: DISCONTINUED | OUTPATIENT
Start: 2025-01-08 | End: 2025-01-08

## 2025-01-08 RX ORDER — DEXMEDETOMIDINE HYDROCHLORIDE 100 UG/ML
INJECTION, SOLUTION INTRAVENOUS AS NEEDED
Status: DISCONTINUED | OUTPATIENT
Start: 2025-01-08 | End: 2025-01-08 | Stop reason: SURG

## 2025-01-08 RX ORDER — HYDROCODONE BITARTRATE AND ACETAMINOPHEN 10; 325 MG/1; MG/1
1 TABLET ORAL EVERY 4 HOURS PRN
Status: DISCONTINUED | OUTPATIENT
Start: 2025-01-08 | End: 2025-01-10 | Stop reason: HOSPADM

## 2025-01-08 RX ORDER — ONDANSETRON 2 MG/ML
4 INJECTION INTRAMUSCULAR; INTRAVENOUS EVERY 6 HOURS PRN
Status: DISCONTINUED | OUTPATIENT
Start: 2025-01-08 | End: 2025-01-08 | Stop reason: SDUPTHER

## 2025-01-08 RX ORDER — OXYTOCIN/0.9 % SODIUM CHLORIDE 30/500 ML
999 PLASTIC BAG, INJECTION (ML) INTRAVENOUS ONCE
Status: DISCONTINUED | OUTPATIENT
Start: 2025-01-08 | End: 2025-01-08 | Stop reason: HOSPADM

## 2025-01-08 RX ORDER — FENTANYL/BUPIVACAINE/NS/PF 2-1250MCG
PLASTIC BAG, INJECTION (ML) INJECTION CONTINUOUS PRN
Status: DISCONTINUED | OUTPATIENT
Start: 2025-01-08 | End: 2025-01-08 | Stop reason: SURG

## 2025-01-08 RX ORDER — OXYTOCIN/0.9 % SODIUM CHLORIDE 30/500 ML
125 PLASTIC BAG, INJECTION (ML) INTRAVENOUS ONCE AS NEEDED
Status: DISCONTINUED | OUTPATIENT
Start: 2025-01-08 | End: 2025-01-08

## 2025-01-08 RX ORDER — BISACODYL 10 MG
10 SUPPOSITORY, RECTAL RECTAL DAILY PRN
Status: DISCONTINUED | OUTPATIENT
Start: 2025-01-09 | End: 2025-01-10 | Stop reason: HOSPADM

## 2025-01-08 RX ORDER — LIDOCAINE HYDROCHLORIDE 20 MG/ML
INJECTION, SOLUTION EPIDURAL; INFILTRATION; INTRACAUDAL; PERINEURAL
Status: COMPLETED
Start: 2025-01-08 | End: 2025-01-08

## 2025-01-08 RX ORDER — LIDOCAINE HYDROCHLORIDE 10 MG/ML
INJECTION, SOLUTION EPIDURAL; INFILTRATION; INTRACAUDAL; PERINEURAL
Status: COMPLETED
Start: 2025-01-08 | End: 2025-01-08

## 2025-01-08 RX ORDER — ACETAMINOPHEN 325 MG/1
650 TABLET ORAL EVERY 4 HOURS PRN
Status: DISCONTINUED | OUTPATIENT
Start: 2025-01-08 | End: 2025-01-08 | Stop reason: SDUPTHER

## 2025-01-08 RX ORDER — IBUPROFEN 600 MG/1
600 TABLET, FILM COATED ORAL EVERY 6 HOURS PRN
Status: DISCONTINUED | OUTPATIENT
Start: 2025-01-08 | End: 2025-01-10 | Stop reason: HOSPADM

## 2025-01-08 RX ORDER — IBUPROFEN 600 MG/1
600 TABLET, FILM COATED ORAL EVERY 6 HOURS PRN
Status: DISCONTINUED | OUTPATIENT
Start: 2025-01-08 | End: 2025-01-08 | Stop reason: HOSPADM

## 2025-01-08 RX ORDER — PRENATAL VIT/IRON FUM/FOLIC AC 27MG-0.8MG
1 TABLET ORAL DAILY
Status: DISCONTINUED | OUTPATIENT
Start: 2025-01-08 | End: 2025-01-10 | Stop reason: HOSPADM

## 2025-01-08 RX ORDER — DOCUSATE SODIUM 100 MG/1
100 CAPSULE, LIQUID FILLED ORAL 2 TIMES DAILY
Status: DISCONTINUED | OUTPATIENT
Start: 2025-01-08 | End: 2025-01-10 | Stop reason: HOSPADM

## 2025-01-08 RX ORDER — LIDOCAINE HYDROCHLORIDE 20 MG/ML
INJECTION, SOLUTION EPIDURAL; INFILTRATION; INTRACAUDAL; PERINEURAL AS NEEDED
Status: DISCONTINUED | OUTPATIENT
Start: 2025-01-08 | End: 2025-01-08 | Stop reason: SURG

## 2025-01-08 RX ORDER — LIDOCAINE HYDROCHLORIDE AND EPINEPHRINE 15; 5 MG/ML; UG/ML
INJECTION, SOLUTION EPIDURAL
Status: ACTIVE
Start: 2025-01-08 | End: 2025-01-08

## 2025-01-08 RX ORDER — HYDROCODONE BITARTRATE AND ACETAMINOPHEN 5; 325 MG/1; MG/1
1 TABLET ORAL EVERY 4 HOURS PRN
Status: DISCONTINUED | OUTPATIENT
Start: 2025-01-08 | End: 2025-01-10 | Stop reason: HOSPADM

## 2025-01-08 RX ORDER — FENTANYL/BUPIVACAINE/NS/PF 2-1250MCG
PLASTIC BAG, INJECTION (ML) INJECTION
Status: COMPLETED
Start: 2025-01-08 | End: 2025-01-08

## 2025-01-08 RX ORDER — CARBOPROST TROMETHAMINE 250 UG/ML
250 INJECTION, SOLUTION INTRAMUSCULAR AS NEEDED
Status: DISCONTINUED | OUTPATIENT
Start: 2025-01-08 | End: 2025-01-08 | Stop reason: HOSPADM

## 2025-01-08 RX ORDER — OXYTOCIN/0.9 % SODIUM CHLORIDE 30/500 ML
250 PLASTIC BAG, INJECTION (ML) INTRAVENOUS CONTINUOUS
Status: DISCONTINUED | OUTPATIENT
Start: 2025-01-08 | End: 2025-01-08

## 2025-01-08 RX ORDER — ONDANSETRON 4 MG/1
4 TABLET, ORALLY DISINTEGRATING ORAL EVERY 8 HOURS PRN
Status: DISCONTINUED | OUTPATIENT
Start: 2025-01-08 | End: 2025-01-10 | Stop reason: HOSPADM

## 2025-01-08 RX ADMIN — Medication 12 ML/HR: at 11:42

## 2025-01-08 RX ADMIN — WITCH HAZEL: 500 SOLUTION RECTAL; TOPICAL at 17:29

## 2025-01-08 RX ADMIN — PRENATAL VITAMINS-IRON FUMARATE 27 MG IRON-FOLIC ACID 0.8 MG TABLET 1 TABLET: at 20:30

## 2025-01-08 RX ADMIN — LIDOCAINE HYDROCHLORIDE 5 ML: 20 INJECTION, SOLUTION EPIDURAL; INFILTRATION; INTRACAUDAL; PERINEURAL at 11:35

## 2025-01-08 RX ADMIN — LIDOCAINE HYDROCHLORIDE 30 ML: 10 INJECTION, SOLUTION EPIDURAL; INFILTRATION; INTRACAUDAL; PERINEURAL at 14:45

## 2025-01-08 RX ADMIN — DEXMEDETOMIDINE HYDROCHLORIDE 20 MCG: 100 INJECTION, SOLUTION INTRAVENOUS at 14:08

## 2025-01-08 RX ADMIN — CLINDAMYCIN PHOSPHATE 900 MG: 900 INJECTION, SOLUTION INTRAVENOUS at 08:06

## 2025-01-08 RX ADMIN — LIDOCAINE HYDROCHLORIDE,EPINEPHRINE BITARTRATE 3 ML: 10; .01 INJECTION, SOLUTION INFILTRATION; PERINEURAL at 11:30

## 2025-01-08 RX ADMIN — Medication 1 G: at 17:29

## 2025-01-08 RX ADMIN — FENTANYL CITRATE 100 MCG: 50 INJECTION, SOLUTION INTRAMUSCULAR; INTRAVENOUS at 14:08

## 2025-01-08 RX ADMIN — BUPIVACAINE HYDROCHLORIDE 5 ML: 5 INJECTION, SOLUTION EPIDURAL; INTRACAUDAL; PERINEURAL at 14:08

## 2025-01-08 RX ADMIN — CLINDAMYCIN PHOSPHATE 900 MG: 900 INJECTION, SOLUTION INTRAVENOUS at 00:02

## 2025-01-08 RX ADMIN — DOCUSATE SODIUM 100 MG: 100 CAPSULE, LIQUID FILLED ORAL at 20:34

## 2025-01-08 NOTE — PLAN OF CARE
Goal Outcome Evaluation:      Patient assisted to bathroom with Vanessa Rock and was able to void. Prema care perform, clothing changed, and patient was transferred to postpartum unit. Patient not breastfeeding at this time. Patient tolerating regular diet with no issues. Will continue to monitor.

## 2025-01-08 NOTE — ANESTHESIA PROCEDURE NOTES
Labor Epidural    Pre-sedation assessment completed: 1/8/2025 11:06 AM    Patient reassessed immediately prior to procedure    Patient location during procedure: OB  Start Time: 1/8/2025 11:06 AM  Stop Time: 1/8/2025 11:30 AM  Performed By  Anesthesiologist: Kiel Booth MD CRNA/CAA: Kandy Solomon CRNA  Preanesthetic Checklist  Completed: patient identified, IV checked, site marked, risks and benefits discussed, surgical consent, monitors and equipment checked, pre-op evaluation and timeout performed  Prep:  Pt Position:sitting  Sterile Tech:cap, gloves, mask and sterile barrier  Prep:chlorhexidine gluconate and isopropyl alcohol  Monitoring:blood pressure monitoring and continuous pulse oximetry  Epidural Block Procedure:  Approach:midline  Guidance:landmark technique and palpation technique  Location:L4-L5  Needle Type:Tuohy  Needle Gauge:17 G  Loss of Resistance Medium: saline  Loss of Resistance: 6cm  Cath Depth at skin:11 cm  Paresthesia: none  Aspiration:negative  Test Dose:negative  Number of Attempts: 2  Post Assessment:  Dressing:occlusive dressing applied and secured with tape  Pt Tolerance:patient tolerated the procedure well with no apparent complications  Complications:no

## 2025-01-08 NOTE — PAYOR COMM NOTE
"“THIS IS “DELIVERY NOTIFICATION ONLY FOR XENA CAICEDO.  PT HAD A VAGINAL DELIVERY ON 01/08/2025.    NOTE: THIS IS NOT A PA REQUEST, JUST NOTIFICATION--  IF PT EXCEEDS THE 48 HR FEDERAL GUIDELINE FOR VAGINAL DELIVERY, A PA REQUEST WILL BE SENT.”        THANK YOU,        Rosey Feldman RN MSN  /UR  [facility]  [phone] office  [phone] fax  [email or web address]    [facility]  NPI: [id number]  Tax: [id number]          Xena Caicedo (24 y.o. Female)       Date of Birth   2000    Social Security Number       Address   [address] [place] [address] [place] [address] [place] [address]    Home Phone   [phone]    MRN   [id number]       Confucianism   Mormonism    Marital Status   Single                            Admission Date   1/7/25    Admission Type   Elective    Admitting Provider   Lucia Chapa MD    Attending Provider   Urszula Zaragoza MD    Department, Room/Bed   [facility] LABOR AND DELIVERY, L462/1       Discharge Date       Discharge Disposition       Discharge Destination                                 Attending Provider: Urszula Zaragoza MD    Allergies: Amoxicillin, Bee Venom, Lavender Oil, Sodium Hypochlorite    Isolation: None   Infection: None   Code Status: CPR    Ht: 175.3 cm (69\")   Wt: 150 kg (330 lb 4 oz)    Admission Cmt: None   Principal Problem: Encounter for induction of labor [Z34.90]                   Active Insurance as of 1/7/2025       Primary Coverage       Payor Plan Insurance Group Employer/Plan Group    [employer] [employer] [facility] PLAN        Payor Plan Address Payor Plan Phone Number Payor Plan Fax Number Effective Dates    [address] [phone]  3/1/2021 - None Entered    [place] [address]         Subscriber Name Subscriber Birth Date Member ID       XENA CAICEDO 2000 [id number]                     Emergency Contacts        (Rel.) Home Phone Work Phone Mobile Phone "    HALEY SINGH (Godmother) -- -- [phone]    NATHEN SINGH (Friend) [phone] -- --

## 2025-01-08 NOTE — ANESTHESIA POSTPROCEDURE EVALUATION
Patient: Xena Caicedo    Procedure Summary       Date: 01/08/25 Room / Location:     Anesthesia Start: 1106 Anesthesia Stop: 1436    Procedure: LABOR ANALGESIA Diagnosis:     Scheduled Providers:  Provider: Kandy Solomon CRNA    Anesthesia Type: epidural ASA Status: 2            Anesthesia Type: epidural    Vitals  Vitals Value Taken Time   /64 01/08/25 1531   Temp 97.7 °F (36.5 °C) 01/08/25 1232   Pulse 102 01/08/25 1535   Resp     SpO2 99 % 01/08/25 1535   Vitals shown include unfiled device data.        Post Anesthesia Care and Evaluation    Patient location during evaluation: PACU  Patient participation: complete - patient participated  Level of consciousness: awake  Pain scale: See nurse's notes for pain score.  Pain management: adequate    Airway patency: patent  Anesthetic complications: No anesthetic complications  PONV Status: none  Cardiovascular status: acceptable  Respiratory status: acceptable and spontaneous ventilation  Hydration status: acceptable  Post Neuraxial Block status: Motor and sensory function returned to baseline and No signs or symptoms of PDPH  Comments: Patient seen and examined postoperatively; vital signs stable; SpO2 greater than or equal to 90%; cardiopulmonary status stable; nausea/vomiting adequately controlled; pain adequately controlled; no apparent anesthesia complications; patient discharged from anesthesia care when discharge criteria were met

## 2025-01-08 NOTE — H&P
DC Phillips  Obstetric History and Physical     Chief Complaint: IOL at term    Subjective     Patient is a 24 y.o. female  currently at 39w3d, who presents for IOL at term.     Her prenatal care is c/b anemia, obesity, abnormal 1 hour with no 3 hour, GBS positive, hypothyroidism.      Prenatal Information:  External Prenatal Results       Pregnancy Outside Results - Transcribed From Office Records - See Scanned Records For Details       Test Value Date Time    ABO  O  25    Rh  Positive  25    Antibody Screen  Negative  25      ^ Negative  24     Varicella IgG       Rubella ^ Immune  24     Hgb  10.7 g/dL 25       11.2 g/dL 08/15/24 2125       11.4 g/dL 24 1431    Hct  32.9 % 25       33.9 % 08/15/24 2125       34.8 % 24 1431    HgB A1c        1h GTT       3h GTT Fasting       3h GTT 1 hour       3h GTT 2 hour       3h GTT 3 hour        Gonorrhea (discrete)       Chlamydia (discrete)       RPR       Syphils cascade: TP-Ab (FTA)  Non-Reactive  25    TP-Ab  Non-Reactive  25    TP-Ab (EIA)       TPPA       HBsAg ^ Negative  24     Herpes Simplex Virus PCR       Herpes Simplex VIrus Culture       HIV ^ Non-Reactive  24     Hep C RNA Quant PCR       Hep C Antibody ^ NR  24     AFP       NIPT       Cystic Fibrosis (Carmen)       Cystic Fibroisis        Spinal Muscular atrophy       Fragile X       Group B Strep ^ Positive  24     GBS Susceptibility to Clindamycin       GBS Susceptibility to Erythromycin       Fetal Fibronectin       Genetic Testing, Maternal Blood                 Drug Screening       Test Value Date Time    Urine Drug Screen       Amphetamine Screen       Barbiturate Screen       Benzodiazepine Screen       Methadone Screen       Phencyclidine Screen       Opiates Screen       THC Screen       Cocaine Screen       Propoxyphene Screen       Buprenorphine Screen        Methamphetamine Screen       Oxycodone Screen       Tricyclic Antidepressants Screen                 Legend    ^: Historical                             Past OB History:    Pregnancy History    #1  [2019]: 39w M/Gal 7.10lbs EPI  F/Dr. Zaragoza comments: no complications  #2       Past Medical History: Past Medical History:   Diagnosis Date    Anxiety     Bipolar 1 disorder     Chlamydia     Palpitations     Urogenital trichomoniasis         Past Surgical History History reviewed. No pertinent surgical history.     Family History: Family History   Adopted: Yes      Social History:  reports that she has never smoked. She has never been exposed to tobacco smoke. She has never used smokeless tobacco.   reports that she does not currently use alcohol.   reports no history of drug use.        General ROS: Pertinent items are noted in HPI    Objective      Vitals:     Vitals:    25 1832 25 1902 25 2100 25 0354   BP: 119/63 99/75 132/78 118/89   BP Location:   Right arm Right arm   Patient Position:   Sitting Sitting   Pulse: 91 88 112 94   Resp:   18 18   Temp:   97.9 °F (36.6 °C) 98.5 °F (36.9 °C)   TempSrc:   Oral Oral   SpO2:   98%    Weight:       Height:           Fetal Heart Rate Assessment:   130, mod variability    White Signal:   Every 4 min     Physical Exam:     General Appearance:    Alert, cooperative, in no acute distress   Abdomen:     Soft, non-tender, EFW 7 1/2-8 lbs   Pelvic Exam:    Presentation: vtx    Cervix: was checked (by me): 3 cm / 75% % / -1, AROM- Clear, and IUPC placed without difficulty, pelvis clinically adequate   Extremities:   Moves all extremities well   Skin:   No bleeding, bruising or rash         Laboratory Results:   Lab Results (last 48 hours)       Procedure Component Value Units Date/Time    Treponema pallidum AB w/Reflex RPR [[id number]]  (Normal) Collected: 25    Specimen: Blood Updated: 25 3647     Treponemal AB Total  Non-Reactive    Narrative:      Reactive results will reflex RPR testing.    Hepatitis C Antibody [[id number]] Resulted: 05/16/24    Specimen: Blood Updated: 01/07/25 2023     External Hepatitis C Ab NR    Hepatitis B Surface Antigen [[id number]] Resulted: 05/16/24    Specimen: Blood Updated: 01/07/25 2023     External Hepatitis B Surface Ag Negative    Rubella Antibody, IgG [[id number]] Resulted: 05/16/24    Specimen: Blood Updated: 01/07/25 2023     External Rubella Qual Immune    HIV-1 Antibody, EIA [[id number]] Resulted: 05/16/24    Specimen: Blood Updated: 01/07/25 2023     External HIV Antibody Non-Reactive    Group B Streptococcus Culture - Swab, Vaginal/Rectum [[id number]] Resulted: 05/16/24    Specimen: Swab from Vaginal/Rectum Updated: 01/07/25 2023     External Strep Group B Ag Positive    CBC & Differential [[id number]]  (Abnormal) Collected: 01/07/25 2009    Specimen: Blood Updated: 01/07/25 2021    Narrative:      The following orders were created for panel order CBC & Differential.  Procedure                               Abnormality         Status                     ---------                               -----------         ------                     CBC Auto Differential[[id number]]        Abnormal            Final result                 Please view results for these tests on the individual orders.    CBC Auto Differential [[id number]]  (Abnormal) Collected: 01/07/25 2009    Specimen: Blood Updated: 01/07/25 2021     WBC 11.77 10*3/mm3      RBC 3.86 10*6/mm3      Hemoglobin 10.7 g/dL      Hematocrit 32.9 %      MCV 85.2 fL      MCH 27.7 pg      MCHC 32.5 g/dL      RDW 13.9 %      RDW-SD 42.9 fl      MPV 12.3 fL      Platelets 281 10*3/mm3      Neutrophil % 75.0 %      Lymphocyte % 18.4 %      Monocyte % 5.7 %      Eosinophil % 0.3 %      Basophil % 0.3 %      Immature Grans % 0.3 %      Neutrophils, Absolute 8.82 10*3/mm3      Lymphocytes, Absolute 2.17 10*3/mm3      Monocytes, Absolute 0.67 10*3/mm3       Eosinophils, Absolute 0.03 10*3/mm3      Basophils, Absolute 0.04 10*3/mm3      Immature Grans, Absolute 0.04 10*3/mm3      nRBC 0.0 /100 WBC     POC Glucose Once [[id number]]  (Abnormal) Collected: 01/07/25 1825    Specimen: Blood Updated: 01/07/25 1828     Glucose 108 mg/dL      Comment: Serial Number: [id number]:  [id number]                  Assessment & Plan     Principal Problem:    Encounter for induction of labor  Active Problems:    Encounter for induction of labor         Assessment:  1.  Intrauterine pregnancy at 39w3d gestation with reassuring fetal status.    2.  IOL at term  3.  GBS status:   External Strep Group B Ag   Date Value Ref Range Status   05/16/2024 Positive  Final     4.  FSR    Plan:  1. Vaginal anticipated  2. Clindamycin for GBS prophylaxis       Urszula Zaragoza MD   1/8/2025   07:50 EST

## 2025-01-08 NOTE — PLAN OF CARE
Problem: Adult Inpatient Plan of Care  Goal: Plan of Care Review  Outcome: Progressing  Flowsheets (Taken 1/8/2025 5561)  Progress: improving  Plan of Care Reviewed With: patient  Goal: Patient-Specific Goal (Individualized)  Outcome: Progressing  Goal: Absence of Hospital-Acquired Illness or Injury  Outcome: Progressing  Goal: Optimal Comfort and Wellbeing  Outcome: Progressing  Goal: Readiness for Transition of Care  Outcome: Progressing     Problem: Labor  Goal: Hemostasis  Outcome: Progressing  Goal: Stable Fetal Wellbeing  Outcome: Progressing  Goal: Effective Progression to Delivery  Outcome: Progressing  Goal: Absence of Infection Signs and Symptoms  Outcome: Progressing  Goal: Acceptable Pain Control  Outcome: Progressing  Goal: Normal Uterine Contraction Pattern  Outcome: Progressing   Goal Outcome Evaluation:  Plan of Care Reviewed With: patient        Progress: improving

## 2025-01-08 NOTE — ANESTHESIA PREPROCEDURE EVALUATION
Anesthesia Evaluation     Patient summary reviewed and Nursing notes reviewed                Airway   Mallampati: II  TM distance: >3 FB  Neck ROM: full  No difficulty expected  Dental    (+) poor dentition    Pulmonary - negative pulmonary ROS and normal exam   Cardiovascular - negative cardio ROS and normal exam        Neuro/Psych  (+) psychiatric history Bipolar  GI/Hepatic/Renal/Endo - negative ROS     Musculoskeletal (-) negative ROS    Abdominal    Substance History - negative use     OB/GYN    (+) Pregnant        Other - negative ROS                   Anesthesia Plan    ASA 2     epidural       Anesthetic plan, risks, benefits, and alternatives have been provided, discussed and informed consent has been obtained with: patient.  Pre-procedure education provided  Plan discussed with CRNA.    CODE STATUS:    Level Of Support Discussed With: Patient  Code Status (Patient has no pulse and is not breathing): CPR (Attempt to Resuscitate)  Medical Interventions (Patient has pulse or is breathing): Full Support

## 2025-01-08 NOTE — L&D DELIVERY NOTE
[facility]  Vaginal Delivery Note    Pre-delivery diagnosis     1. 24 y.o.  at 39w3d  2. IOL at term    Post-delivery diagnosis  Same    Delivery     Delivery:  Vacuum assisted Vaginal Delivery    Date of Delivery:   2025   Anesthesia:  Epidural    Delivering clinician:  Urszula Zaragoza MD      Pt presented to L&D for IOL at term. She had cervidil overnight and progressed to 3 cm. AROM with clear fluid. IUPC placed without difficulty. Pt progressed spontaneously to C/C/+2.     She was unable to push due to pain. The fetal head was , and we were unable to trace heart tones. A vacuum was used to deliver the head without difficulty. It was correctly placed and suction applied. One pull and no pop offs. The head delivered in OA, then the shoulders and remainder delivered without difficulty. L shoulder anterior. The mouth and nose were suctioned. There was good cry, color, tone and movement of all extremities. The infant was placed on the mother's chest and abdomen and cared for by delivery team RN. The cord was clamped and cut. Cord gasses and blood were drawn. The placenta delivered spontaneously, intact and with a 3 vessel cord. The uterus, cervix and vagina were explored. There was a periurethral, R labial and 2nd degree midine laceration. Repaired with 3.0 chromic and 3.0 monocryl. Good cosmesis and hemostasis noted. Sponge and needle counts correct. The patient and infant were left to recover in L&D.    Infant    Findings: male infant       Apgars:   9  @ 1 minute /   9  @ 5 minutes         AB.25/-1.1  VB.299/-4.1    Placenta, Cord, and Fluid    Placenta delivered  spontaneous  3VC          Lacerations       2nd degree, labial, and periurethral     Estimated Blood Loss 300 mL     Complications  none    Disposition  Mother to Mother Baby/Postpartum  in stable condition.  Baby remains with mom  in stable condition.      Urszula Zaragoza MD  25  15:38 EST

## 2025-01-09 PROBLEM — Z34.90 ENCOUNTER FOR INDUCTION OF LABOR: Status: ACTIVE | Noted: 2025-01-09

## 2025-01-09 PROCEDURE — 97161 PT EVAL LOW COMPLEX 20 MIN: CPT | Performed by: PHYSICAL THERAPIST

## 2025-01-09 RX ADMIN — WITCH HAZEL: 500 SOLUTION RECTAL; TOPICAL at 17:41

## 2025-01-09 RX ADMIN — Medication 1 G: at 17:41

## 2025-01-09 NOTE — PLAN OF CARE
Goal Outcome Evaluation:               Pt fundus and bleeding WDL. VSS. Pt educated on sitz bath and encouraged to try for pain control. Pt bottle feeding infant and bonding well.

## 2025-01-09 NOTE — PLAN OF CARE
Goal Outcome Evaluation:  Plan of Care Reviewed With: patient           Outcome Evaluation: Patient is a 25 y/o F,, who came to [facility] 39w3d gestation.  She had a vacuum assisted vaginal birth 24, with a 2nd degree midline laceration, right labial laceration, and periurethral laceration.  During today's evaluation PT provided pt with handout regarding postpartum healing post vaginal birth.  She demonstrated good understanding of material after education.  Provided pt with handout to keep, and contact information is present if pt has any additional PT needs/questions while in the hospital or post discharge.    Vaginal Patient education provided on:   -Body changes after pregnancy, grade of tear and what structures involved.   -Pelvic floor musculature, Transversus abdominus muscle  -Diaphragmatic breathing  -Proper kegel performance, as well as importance of relaxation   -TARAH  -Body mechanics   -Proper breathing/pressure management   -Toileting posture   -Benefits of exercise  -Basic/beginning exercise 0-2 weeks, 2-6 weeks, and after 6 weeks  -Postpartum safe stretches   -UI  -Perineal scar massage   -When it may be beneficial to see a Pelvic PT

## 2025-01-09 NOTE — CASE MANAGEMENT/SOCIAL WORK
Social Work Assessment  [facility]     Patient Name: Xena Caicedo  MRN: [id number]  Today's Date: 1/9/2025    Admit Date: 1/7/2025       Discharge Plan       Row Name 01/09/25 1611       Plan    Plan Routine home with family.    Patient/Family in Agreement with Plan yes    Plan Comments LSW met with pt and father of baby (LEROY Dietz) at bedside, introduced self/role, and reason for visit. LSW asked pt if she has a PCP for herself and pediatrician for infant. Pt stated they have both providers established. LSW inquired about pt’s insurance and pt stated they have Medicaid and are going to call to add infant. LSW asked pt if they have all supplies (diapers, wipes, car seat, pack-and-play, crib, etc.) for infant and pt stated they have everything, but don’t have an infant carrier car seat. Pt stated they have one that converts to a toddler seat. LSW informed pt that LSW could inquire about car seat andreea, but couldn’t guarantee obtaining one and that, if obtained, one wouldn’t be available until time of discharge. Pt verbalized understanding and LEROY stated that if not able to obtain one, he could contact his GM for help. Pt also stated that they don’t have access to needed formula after discharge. LSW informed pt that during admission they would be provided with formula, but after discharge they were able to request formula from their pediatrician as well as utilize [facility] for formula until their [place] benefits are updated. Pt verbalized understanding. LSW inquired about [facility] services and pt stated she is current with [facility], but hasn’t notified them yet about infant’s birth. LSW instructed pt to call within 10 days to notify them. Pt verbalized understanding. LSW asked pt if she has utilized mental health treatment and pt stated that she has been on medication previously and intends to get back on it now that infant has been delivered. LSW inquired about pt’s current living situation and whether or not  there are safety concerns in the home. Pt stated that she lives at home with FOB and their two children (including infant) and there are no safety concerns. LSW discussed signs/symptoms of postpartum, mental health resources, and community resources ([facility], [facility], [facility], [facility], etc.) and offered printed materials. Pt stated she is current with [facility] and agreeable to receiving all information discussed. LSW provided materials at bedside and instructed pt on how to request follow up from social work. Pt verbalized understanding and has no further needs at this time. LSW staffed case with supervisor (Marilou) and pt does not qualify for car seat andreea at this time due to pt already having car seat that is safe for infant at this time. Pt to be notified at bedside.             SHAHEEN Barros, SARAH    Phone: [phone]  Fax: [phone]  [email or web address]

## 2025-01-09 NOTE — THERAPY EVALUATION
Patient Name: Xena Caicedo  : 2000    MRN: [id number]                              Today's Date: 2025       Admit Date: 2025    Visit Dx: No diagnosis found.  Patient Active Problem List   Diagnosis    Hyperglycemia    Syncope    Palpitations    Anxiety    Diarrhea    Elevated liver enzymes    Encounter for other contraceptive management    Dizziness    Post traumatic stress disorder (PTSD)    Generalized anxiety disorder    Chronic nasal congestion    Chronic cough    Mold exposure    Preventative health care    Elevated blood pressure reading without diagnosis of hypertension    Ankle swelling    Encounter for induction of labor    Encounter for induction of labor     Past Medical History:   Diagnosis Date    Anxiety     Bipolar 1 disorder     Chlamydia     Palpitations     Urogenital trichomoniasis      History reviewed. No pertinent surgical history.   General Information       Row Name 25 1155          Physical Therapy Time and Intention    Document Type evaluation  -EJ     Mode of Treatment physical therapy  -EJ       Row Name 25 1155          General Information    Patient Profile Reviewed yes  -EJ     Prior Level of Function independent:  -EJ     Existing Precautions/Restrictions lifting  -EJ     Barriers to Rehab none identified  -EJ       Row Name 25 1155          Living Environment    People in Home child(aidan), dependent  -EJ       Row Name 25 1155          Cognition    Orientation Status (Cognition) oriented x 4  -EJ               User Key  (r) = Recorded By, (t) = Taken By, (c) = Cosigned By      Initials Name Provider Type    EJ Jelly Hannah, PT Physical Therapist                   Mobility       Row Name 25 1155          Bed Mobility    Bed Mobility bed mobility (all) activities  -EJ     All Activities, [place] (Bed Mobility) independent  -EJ       Row Name 25 1155          Sit-Stand Transfer    Sit-Stand [place] (Transfers)  independent  -[facility] Name 25 1155          Gait/Stairs (Locomotion)    [place]ott Level (Gait) independent  -EJ     Distance in Feet (Gait) --  Pt is able to be up ad lucero in her room and hallway as tolerated.  -EJ               User Key  (r) = Recorded By, (t) = Taken By, (c) = Cosigned By      Initials Name Provider Type    Jelly Alexander, PT Physical Therapist                   Obj/Interventions       [facility] Name 25 1155          Range of Motion Comprehensive    Comment, General Range of Motion Mild trunk ROM deficits related to  section pain/limitations.  -[facility] Name 25 1155          Strength Comprehensive (MMT)    Comment, General Manual Muscle Testing (MMT) Assessment Formal strength testing not performed this date due to recent delivery. Pt may benefit from pelvic floor/core strengthening/assessment once able.  -EJ               User Key  (r) = Recorded By, (t) = Taken By, (c) = Cosigned By      Initials Name Provider Type    Jelly Alexander, PT Physical Therapist                   Goals/Plan    No documentation.                  Clinical Impression       [facility] Name 25 1156          Pain    Pain Side/Orientation lower  -     Additional Documentation Pain Scale: FACES Pre/Post-Treatment (Group)  -EJ       [facility] Name 25 1156          Pain Scale: FACES Pre/Post-Treatment    Pain: FACES Scale, Pretreatment 4-->hurts little more  -EJ     Posttreatment Pain Rating 4-->hurts little more  -EJ       Row Name 25 1156          Plan of Care Review    Plan of Care Reviewed With patient  -EJ     Outcome Evaluation Patient is a 25 y/o F,, who came to [facility] 39w3d gestation.  She had a vacuum assisted vaginal birth 24, with a 2nd degree midline laceration, right labial laceration, and periurethral laceration.  During today's evaluation PT provided pt with handout regarding postpartum healing post vaginal birth.  She demonstrated good understanding of  material after education.  Provided pt with handout to keep, and contact information is present if pt has any additional PT needs/questions while in the hospital or post discharge.  -       Row Name 01/09/25 1156          Therapy Assessment/Plan (PT)    Criteria for Skilled Interventions Met (PT) no  -EJ     Therapy Frequency (PT) evaluation only  -EJ     Predicted Duration of Therapy Intervention (PT) discharge  -       Row Name 01/09/25 1156          Positioning and Restraints    Pre-Treatment Position in bed  -EJ     Post Treatment Position bed  -EJ     In Bed fowlers;call light within reach;with family/caregiver  -EJ               User Key  (r) = Recorded By, (t) = Taken By, (c) = Cosigned By      Initials Name Provider Type    Jelly Alexander, PT Physical Therapist                   Outcome Measures       Row Name 01/09/25 1158          How much help from another person do you currently need...    Turning from your back to your side while in flat bed without using bedrails? 4  -EJ     Moving from lying on back to sitting on the side of a flat bed without bedrails? 4  -EJ     Moving to and from a bed to a chair (including a wheelchair)? 4  -EJ     Standing up from a chair using your arms (e.g., wheelchair, bedside chair)? 4  -EJ     Climbing 3-5 steps with a railing? 4  -EJ     To walk in hospital room? 4  -EJ     AM-PAC 6 Clicks Score (PT) 24  -EJ     Highest Level of Mobility Goal 8 --> Walked 250 feet or more  -       Row Name 01/09/25 1158          Functional Assessment    Outcome Measure Options AM-PAC 6 Clicks Basic Mobility (PT)  -EJ               User Key  (r) = Recorded By, (t) = Taken By, (c) = Cosigned By      Initials Name Provider Type    Jelly Alexander, PT Physical Therapist                                 Physical Therapy Education       Title: PT OT SLP Therapies (Done)       Topic: Physical Therapy (Done)       Point: Mobility training (Done)       Learning Progress Summary             Patient Acceptance, E,H, VU by KYLE at 2025 1158                      Point: Home exercise program (Done)       Learning Progress Summary            Patient Acceptance, E,H, VU by KYLE at 2025 1158                      Point: Body mechanics (Done)       Learning Progress Summary            Patient Acceptance, E,H, VU by  at 2025 1158                      Point: Precautions (Done)       Learning Progress Summary            Patient Acceptance, E,H, VU by  at 2025 1158                                      User Key       Initials Effective Dates Name Provider Type Discipline     24 -  Jelly Hannah, PT Physical Therapist PT                  PT Recommendation and Plan     Outcome Evaluation: Patient is a 23 y/o F,, who came to [facility] 39w3d gestation.  She had a vacuum assisted vaginal birth 24, with a 2nd degree midline laceration, right labial laceration, and periurethral laceration.  During today's evaluation PT provided pt with handout regarding postpartum healing post vaginal birth.  She demonstrated good understanding of material after education.  Provided pt with handout to keep, and contact information is present if pt has any additional PT needs/questions while in the hospital or post discharge.    Vaginal Patient education provided on:   -Body changes after pregnancy, grade of tear and what structures involved.   -Pelvic floor musculature, Transversus abdominus muscle  -Diaphragmatic breathing  -Proper kegel performance, as well as importance of relaxation   -TARAH  -Body mechanics   -Proper breathing/pressure management   -Toileting posture   -Benefits of exercise  -Basic/beginning exercise 0-2 weeks, 2-6 weeks, and after 6 weeks  -Postpartum safe stretches   -UI  -Perineal scar massage   -When it may be beneficial to see a Pelvic PT       Time Calculation:         PT Charges       Row Name 25 1158             Time Calculation    Start Time 1010  -      Stop Time 1019   -EJ      Time Calculation (min) 9 min  -EJ      PT Received On 01/09/25  -EJ         Time Calculation- PT    Total Timed Code Minutes- PT 0 minute(s)  -EJ                User Key  (r) = Recorded By, (t) = Taken By, (c) = Cosigned By      Initials Name Provider Type    Jelly Alexander, PT Physical Therapist                  Therapy Charges for Today       Code Description Service Date Service Provider Modifiers Qty    80705406531 HC PT EVAL LOW COMPLEXITY 3 1/9/2025 Jelly Hannah, PT GP 1            PT G-Codes  Outcome Measure Options: AM-PAC 6 Clicks Basic Mobility (PT)  AM-PAC 6 Clicks Score (PT): 24  PT Discharge Summary  Anticipated Discharge Disposition (PT): home    Jelly Hannah, PT  1/9/2025

## 2025-01-09 NOTE — PLAN OF CARE
Goal Outcome Evaluation:         Patients VSS. Patient had golf ball size clot during void around 2000. Fundus was firm and shifted slightly to the left. No other clots noted this shift. Fundus remains firm and shifted to left even after voiding.   This nurse discussed with the patient leaving IV in place until labs came back and over night for preventative and safety measures.  Patient agreeable as she would prefer not to be stuck again.

## 2025-01-09 NOTE — PROGRESS NOTES
[facility]  Postpartum Note    Subjective   Postpartum Day 1:  Vacuum Assisted Vaginal Delivery    Patient without complaints. Her pain is moderately controlled with prescribed pain medications. She is ambulating well.  Patient describes her bleeding as thin lochia.    Breastfeeding: declines.    Objective     Vitals:  Vitals:    01/08/25 2130 01/08/25 2300 01/09/25 0305 01/09/25 0730   BP:  120/85 115/77 132/82   BP Location:  Right arm Right arm Right arm   Patient Position:  Sitting Sitting Sitting   Pulse:  90 101 79   Resp:  17 20 19   Temp:  98.2 °F (36.8 °C) 97.8 °F (36.6 °C) 97.8 °F (36.6 °C)   TempSrc:  Oral Oral Oral   SpO2:  96% 97% 98%   Weight: (!) 143 kg (316 lb)      Height:           Physical Exam:  General:  Alert and oriented x3. No acute distress.  Abdomen: abdomen is soft without significant tenderness, masses, organomegaly or guarding. Fundus: appropriate, firm, non tender  Incision: N/A  Skin: Warm, Dry  Extremities: Normal,  trace edema. Nontender     Labs:  Results from last 7 days   Lab Units 01/07/25 2009   WBC 10*3/mm3 11.77*   HEMOGLOBIN g/dL 10.7*   HEMATOCRIT % 32.9*   PLATELETS 10*3/mm3 281            Feeding method: Breastfeeding Status: No     Blood Type: RH Positive        Assessment & Plan     Principal Problem:    Encounter for induction of labor  Active Problems:    Encounter for induction of labor      Xena Caicedo is Day 1  post-partum from a  Vacuum Assisted Vaginal Delivery      Plan:  routine, continue present management, encourage ambulation, and plan d/c tomorrow.       Teresa Nolasco, APRN  1/9/2025  09:54 EST

## 2025-01-10 VITALS
OXYGEN SATURATION: 98 % | WEIGHT: 293 LBS | SYSTOLIC BLOOD PRESSURE: 112 MMHG | HEART RATE: 86 BPM | DIASTOLIC BLOOD PRESSURE: 75 MMHG | TEMPERATURE: 98 F | BODY MASS INDEX: 43.4 KG/M2 | HEIGHT: 69 IN | RESPIRATION RATE: 15 BRPM

## 2025-01-10 PROCEDURE — 25010000002 TETANUS-DIPHTH-ACELL PERTUSSIS 5-2.5-18.5 LF-MCG/0.5 SUSPENSION PREFILLED SYRINGE: Performed by: OBSTETRICS & GYNECOLOGY

## 2025-01-10 PROCEDURE — 90471 IMMUNIZATION ADMIN: CPT | Performed by: OBSTETRICS & GYNECOLOGY

## 2025-01-10 PROCEDURE — 90715 TDAP VACCINE 7 YRS/> IM: CPT | Performed by: OBSTETRICS & GYNECOLOGY

## 2025-01-10 RX ORDER — PSEUDOEPHEDRINE HCL 30 MG
100 TABLET ORAL 2 TIMES DAILY
Qty: 20 CAPSULE | Refills: 0 | Status: SHIPPED | OUTPATIENT
Start: 2025-01-10

## 2025-01-10 RX ORDER — IBUPROFEN 600 MG/1
600 TABLET, FILM COATED ORAL EVERY 6 HOURS PRN
Qty: 20 TABLET | Refills: 0 | Status: SHIPPED | OUTPATIENT
Start: 2025-01-10

## 2025-01-10 RX ORDER — FERROUS SULFATE 325(65) MG
325 TABLET ORAL
Qty: 30 TABLET | Refills: 1 | Status: SHIPPED | OUTPATIENT
Start: 2025-01-10

## 2025-01-10 RX ADMIN — Medication 1 G: at 08:28

## 2025-01-10 RX ADMIN — TETANUS TOXOID, REDUCED DIPHTHERIA TOXOID AND ACELLULAR PERTUSSIS VACCINE, ADSORBED 0.5 ML: 5; 2.5; 8; 8; 2.5 SUSPENSION INTRAMUSCULAR at 13:07

## 2025-01-10 NOTE — DISCHARGE SUMMARY
[facility]  Delivery Discharge Summary    Primary OB Clinician: Lucia Chapa MD    Admission Diagnosis:  Principal Problem:    Encounter for induction of labor  Active Problems:    Encounter for induction of labor      Discharge Diagnosis:  Same, delivered    Gestational Age: 39w3d    Date of Delivery: 2025    Delivered By:  Urszula Zaragoza    Delivery Type: Vaginal, Vacuum (Extractor)     Tubal Ligation: n/a    Intrapartum Course: Uncomplicated delivery.     Postpartum Course:  Pt was admitted and underwent  Vacuum Assisted Vaginal Delivery. Pt was transferred to PP where she had an uncomplicated course. Pt remained AFVSS, had scant lochia and pain was well controlled. Pt ambulating and voiding spontaneously. Passing flatus w/o difficulty. Bleeding appropriate for PP period. Pt denies lightheadedness, dizziness, HA, blurred vision or RUQ pain. Pt d/c home in stable condition and will f/u in office for PP visit as scheduled or PRN. Currently both breast and bottle feeding. Plans on  NFP   for contraception.     Physical Exam:    Vitals:   Vitals:    25 1934 25 2113 25 2320 01/10/25 0731   BP: 107/72  142/86 112/75   BP Location: Left arm  Right arm Left arm   Patient Position: Lying  Sitting Lying   Pulse: 113  89 86   Resp: 18  18 15   Temp: 98.3 °F (36.8 °C)  97.9 °F (36.6 °C) 98 °F (36.7 °C)   TempSrc: Oral  Oral Oral   SpO2: 96%  96% 98%   Weight:  (!) 144 kg (317 lb 6.4 oz)     Height:         Temp (24hrs), Av.1 °F (36.7 °C), Min:97.9 °F (36.6 °C), Max:98.3 °F (36.8 °C)      General Appearance:    Alert, cooperative, in no acute distress   Abdomen:     Soft non-tender, non-distended, no guarding, no rebound         tenderness.   Extremities:   Moves all extremities well, no edema, no cyanosis, no              Redness.   Incision:  N/A   Fundus:   Firm, below umbilicus     Feeding method: Breastfeeding Status: Yes - breast and formula feeding      Labs:  Results from last 7 days    Lab Units 01/07/25 2009   WBC 10*3/mm3 11.77*   HEMOGLOBIN g/dL 10.7*   HEMATOCRIT % 32.9*   PLATELETS 10*3/mm3 281           Blood Type: RH Positive      Plan:  Discharge to home.    Follow-up appointment with Dr. Chapa in 6 weeks.   Anemia s/p delivery, asymptomatic. Start PO FeSO4 daily.  All discharge instructions were reviewed with pt including bleeding warnings, s/sx of PP depression, and warning signs in the PP period for which to seek medical attention including but not limited to s/sx of hypertension and thromboembolism.     Swetha Espino, APRN  1/10/2025  10:17 EST

## 2025-01-10 NOTE — PLAN OF CARE
Goal Outcome Evaluation:            VSS. Fundus and bleeding WDL. Patient refuses medications for pain and scheduled meds.  Patient states she wants to attempt to put baby to breast also.

## 2025-01-10 NOTE — NURSING NOTE
Pt states she does not have formula and can not get wick for 3 weeks. [employer] has given her information at [facility] for formula. Provided Patient with 3 1/2 days of formula.  Instructed her to put formula in another bottle. Start out with 30 cc at a time. If baby took all of the 30 cc then add 10cc at a time till baby is full.  If any is left in the original container to save in the frigde to last her more day of food.

## 2025-01-13 LAB
LAB AP CASE REPORT: NORMAL
LAB AP DIAGNOSIS COMMENT: NORMAL
PATH REPORT.FINAL DX SPEC: NORMAL
PATH REPORT.GROSS SPEC: NORMAL

## 2025-01-20 ENCOUNTER — MATERNAL SCREENING (OUTPATIENT)
Dept: CALL CENTER | Facility: HOSPITAL | Age: 25
End: 2025-01-20
Payer: MEDICAID

## 2025-01-20 NOTE — OUTREACH NOTE
Maternal Screening Survey      Flowsheet Row Responses   Facility patient discharged from? [facility]   Attempt successful? Yes   Call start time 1128   Call end time 1131   I have been able to laugh and see the funny side of things. 0   I have looked forward with enjoyment to things. 0   I have blamed myself unnecessarily when things went wrong. 2   I have been anxious or worried for no good reason. 2   I have felt scared or panicky for no good reason. 0   Things have been getting on top of me. 0   I have been so unhappy that I have had difficulty sleeping. 0   I have felt sad or miserable. 0   I have been so unhappy that I have been crying. 0   The thought of harming myself has occurred to me. 0   [place]  Depression Scale Total 4   Did any of your parents have problems with alcohol or drug use? No   Do any of your peers have problems with alcohol or drug use? No   Does your partner have problems with alcohol or drug use? No   Before you were pregnant did you have problems with alcohol or drug use? (past) No   In the past month, did you drink beer, wine, liquor or use any other drugs? (pregnancy) No   Maternal Screening call completed Yes   Call end time 1131              Jacqueline JOSE - Registered Nurse

## 2025-06-11 NOTE — PROGRESS NOTES
[facility]   Critical access hospital[address], [address]  [place] [address]  [phone]  Ijeoma Teresa, MSN, APRN, PMHNP-BC    Subjective   Xena Caicedo is a 24 y.o. female who presents today for follow up for psychiatric medication management.     Chief Complaint: bipolar disorder, anxiety, insomnia.     History of Present Illness:     Medication adjustments last visit:   Continue Trintellix 5mg.  Continue buspirone 15mg, three times daily.   Continue Caplyta 42mg.   Continue trazodone 50-100mg nightly.   Continue hydroxyzine 50mg TID PRN anxiety.       Patient or patient representative verbalized consent for the use of Ambient Listening during the visit with  CAROLYN Ashley for chart documentation. 6/12/2025  10:31 EDT  History of Present Illness  The patient came for follow up today. She has been off medications since before her pregnancy. Her son is with her today and he is 5 months old now.     Anxiety, bipolar disorder:  - Stopped taking Trintellix, buspirone, Caplyta, trazodone, and hydroxyzine during her pregnancy to avoid any risks to the baby  - Before she was pregnant, she used trazodone to help her sleep  - Wants to start taking trazodone and hydroxyzine again, prefers to start hydroxyzine at 25 mg  - Wants to add buspirone back into her routine after a few weeks once she's adjusted to the other medications  -Will resume Caplyta initially at 21mg for 2 weeks, then increase to 42mg.         Patient presents with symptoms and behaviors that are consistent with the following DSM-5 diagnoses:  Bipolar II disorder  2. Generalized anxiety disorder  3. Insomnia   4. PTSD    The following portions of the patient's history were reviewed and updated as appropriate: allergies, current medications, past family history, past medical history, past social history, past surgical history and problem list.    PAST OUTPATIENT TREATMENT  Diagnosis treated:  Affective Disorder,  Anxiety/Panic Disorder  Treatment Type:  Medication Management  Prior Psychiatric Medications:  Prozac--made her suicidal and homicidal.   Vraylar--ineffective at 3mg.       Support Groups:  None  Sequelae Of Mental Disorder:  social isolation, family disruption, emotional distress    Interval History  No change.     Side Effects  None from current medications      Past Medical History:  Past Medical History:   Diagnosis Date    Anxiety     Bipolar 1 disorder     Chlamydia     Palpitations     Urogenital trichomoniasis        Social History:  Social History     Socioeconomic History    Marital status: Single   Tobacco Use    Smoking status: Never     Passive exposure: Never    Smokeless tobacco: Never    Tobacco comments:     Patient is advised to quit vaping, nicotine    Vaping Use    Vaping status: Every Day   Substance and Sexual Activity    Alcohol use: Not Currently     Comment: rarely/occ    Drug use: Never    Sexual activity: Defer       Family History:  Family History   Adopted: Yes       Past Surgical History:  History reviewed. No pertinent surgical history.    Problem List:  Patient Active Problem List   Diagnosis    Hyperglycemia    Syncope    Palpitations    Anxiety    Diarrhea    Elevated liver enzymes    Encounter for other contraceptive management    Dizziness    Post traumatic stress disorder (PTSD)    Generalized anxiety disorder    Chronic nasal congestion    Chronic cough    Mold exposure    Preventative health care    Elevated blood pressure reading without diagnosis of hypertension    Ankle swelling    Encounter for induction of labor    Encounter for induction of labor       Allergy:   Allergies   Allergen Reactions    Amoxicillin Hives    Bee Venom Hives    Lavender Oil Hives    Sodium Hypochlorite Hives        Discontinued Medications:  There are no discontinued medications.  Current Medications:   Current Outpatient Medications   Medication Sig Dispense Refill    busPIRone (BUSPAR) 15 MG  tablet Take 1 tablet by mouth 3 (Three) Times a Day. 90 tablet 2    cetirizine (zyrTEC) 10 MG tablet Take 1 tablet by mouth Daily. 90 tablet 0    docusate sodium 100 MG capsule Take 1 capsule by mouth 2 (Two) Times a Day. 20 capsule 0    ferrous sulfate 325 (65 FE) MG tablet Take 1 tablet by mouth Daily With Breakfast. 30 tablet 1    fluticasone (FLONASE) 50 MCG/ACT nasal spray 2 sprays into the nostril(s) as directed by provider Daily. 1 mL 8    hydrOXYzine (ATARAX) 25 MG tablet Take 1 tablet by mouth 3 (Three) Times a Day As Needed for Anxiety. 90 tablet 2    ibuprofen (ADVIL,MOTRIN) 600 MG tablet Take 1 tablet by mouth Every 6 (Six) Hours As Needed for Mild Pain (First Line: Mild pain.). 20 tablet 0    Lancets (freestyle) lancets Use to check blood glucose twice daily as needed DX: E11.65 100 each 11    Lumateperone Tosylate (Caplyta) 21 MG capsule Take 1 capsule by mouth Daily With Dinner. 14 capsule 0    Lumateperone Tosylate (Caplyta) 42 MG capsule Take 1 capsule by mouth Daily With Dinner. 30 capsule 2    traZODone (DESYREL) 50 MG tablet Take 1 tablet, 1 hour before bed. If still awake in 1 hour, ok to take a second tablet. 60 tablet 2     No current facility-administered medications for this visit.     Physical Exam:   not currently breastfeeding.    MENTAL STATUS EXAM   General Appearance:  Cleanly groomed and dressed  Eye Contact:  Good eye contact  Attitude:  Cooperative  Motor Activity:  Normal gait, posture  Muscle Strength:  Normal  Speech:  Normal rate, tone, volume  Language:  Spontaneous  Mood and affect:  Anxious  Hopelessness:  Denies  Loneliness: Denies  Thought Process:  Logical and linear  Associations/ Thought Content:  No delusions  Hallucinations:  None  Suicidal Ideations:  Not present  Homicidal Ideation:  Not present  Sensorium:  Alert  Orientation:  Person, place and time  Immediate Recall, Recent, and Remote Memory:  Intact  Attention Span/ Concentration:  Good  Fund of Knowledge:   Appropriate for age and educational level  Intellectual Functioning:  Average range  Insight:  Fair  Judgement:  Fair  Reliability:  Fair  Impulse Control:  Fair     PHQ-9 Depression Screening  Little interest or pleasure in doing things? More than half the days   Feeling down, depressed, or hopeless? More than half the days   PHQ-2 Total Score 4   Trouble falling or staying asleep, or sleeping too much? Nearly every day   Feeling tired or having little energy? More than half the days   Poor appetite or overeating? Nearly every day   Feeling bad about yourself - or that you are a failure or have let yourself or your family down? More than half the days   Trouble concentrating on things, such as reading the newspaper or watching television? More than half the days   Moving or speaking so slowly that other people could have noticed? Or the opposite - being so fidgety or restless that you have been moving around a lot more than usual? More than half the days     Thoughts that you would be better off dead, or of hurting yourself in some way? Not at all   PHQ-9 Total Score 18   If you checked off any problems, how difficult have these problems made it for you to do your work, take care of things at home, or get along with other people? Somewhat difficult             Current every day smoker less than 3 minutes spent counseling Not agreeable to stopping    I advised Xena of the risks of tobacco use.     Result Review:    Labs:  No visits with results within 3 Month(s) from this visit.   Latest known visit with results is:   Admission on 01/07/2025, Discharged on 01/10/2025   Component Date Value Ref Range Status    Treponemal AB Total 01/07/2025 Non-Reactive  Non-Reactive Final    ABO Type 01/07/2025 O   Final    RH type 01/07/2025 Positive   Final    Antibody Screen 01/07/2025 Negative   Final    T&S Expiration Date 01/07/2025 1/10/2025 11:59:59 PM   Final    WBC 01/07/2025 11.77 (H)  3.40 - 10.80 10*3/mm3 Final    RBC  01/07/2025 3.86  3.77 - 5.28 10*6/mm3 Final    Hemoglobin 01/07/2025 10.7 (L)  12.0 - 15.9 g/dL Final    Hematocrit 01/07/2025 32.9 (L)  34.0 - 46.6 % Final    MCV 01/07/2025 85.2  79.0 - 97.0 fL Final    MCH 01/07/2025 27.7  26.6 - 33.0 pg Final    MCHC 01/07/2025 32.5  31.5 - 35.7 g/dL Final    RDW 01/07/2025 13.9  12.3 - 15.4 % Final    RDW-SD 01/07/2025 42.9  37.0 - 54.0 fl Final    MPV 01/07/2025 12.3 (H)  6.0 - 12.0 fL Final    Platelets 01/07/2025 281  140 - 450 10*3/mm3 Final    Neutrophil % 01/07/2025 75.0  42.7 - 76.0 % Final    Lymphocyte % 01/07/2025 18.4 (L)  19.6 - 45.3 % Final    Monocyte % 01/07/2025 5.7  5.0 - 12.0 % Final    Eosinophil % 01/07/2025 0.3  0.3 - 6.2 % Final    Basophil % 01/07/2025 0.3  0.0 - 1.5 % Final    Immature Grans % 01/07/2025 0.3  0.0 - 0.5 % Final    Neutrophils, Absolute 01/07/2025 8.82 (H)  1.70 - 7.00 10*3/mm3 Final    Lymphocytes, Absolute 01/07/2025 2.17  0.70 - 3.10 10*3/mm3 Final    Monocytes, Absolute 01/07/2025 0.67  0.10 - 0.90 10*3/mm3 Final    Eosinophils, Absolute 01/07/2025 0.03  0.00 - 0.40 10*3/mm3 Final    Basophils, Absolute 01/07/2025 0.04  0.00 - 0.20 10*3/mm3 Final    Immature Grans, Absolute 01/07/2025 0.04  0.00 - 0.05 10*3/mm3 Final    nRBC 01/07/2025 0.0  0.0 - 0.2 /100 WBC Final    Glucose 01/07/2025 108 (H)  70 - 105 mg/dL Final    Serial Number: [id number]:  [id number]    External Strep Group B Ag 05/16/2024 Positive   Final    External Antibody Screen 05/16/2024 Negative   Final    External ABO Grouping 05/16/2024 O   Final    External Rh Factor 05/16/2024 Positive   Final    External Hepatitis C Ab 05/16/2024 NR   Final    External Hepatitis B Surface Ag 05/16/2024 Negative   Final    External Rubella Qual 05/16/2024 Immune   Final    External HIV Antibody 05/16/2024 Non-Reactive   Final    Case Report 01/08/2025    Final                    Value:Surgical Pathology Report                         Case: [id number]                                   Authorizing Provider:  Urszula Zaragoza MD  Collected:           01/08/2025 02:53 PM          Ordering Location:     [facility] Received:            01/08/2025 05:28 PM                                 AND DELIVERY                                                                 Pathologist:           Mauro Moreno MD                                                             Specimen:    Placenta                                                                                   Final Diagnosis 01/08/2025    Final                    Value:Placenta, third trimester, vaginal delivery:    Term placenta, weight 457 grams ( 25th and 50th percentile for gestational age)    Villous maturation appropriate for gestational age    No evidence of infection, thrombosis, or significant infarction    See comment     JPR/sms       Comment 01/08/2025    Final                    Value:This diagnosis is rendered following consultation with [facility]. See the attached consultation report for further details.       Gross Description 01/08/2025    Final                    Value:1. Placenta.  Received in formalin designated placenta is a discoid placenta measuring 18 x 17.5 x 2.8 cm.  The umbilical cord inserts eccentrically and measures 20 x 1.3 cm.  The fetal surface is shiny and smooth with a normal vascular pattern and rare subchorionic fibrin deposition.  The maternal surface is intact without significant adherent blood clot, the fetal membranes are opaque.  After removal of the cord and membranes the placental disc weighs 508 g.  Sectioning reveals spongy red cut surfaces, no infarcts for masses are identified.  Sections as follows: Cassette A cord, cassette B membranes, cassettes C and D sections of maternal and fetal placenta.  JOSH           Assessment & Plan   Diagnoses and all orders for this visit:    1. Bipolar II disorder (Primary)  -     Lumateperone Tosylate (Caplyta) 42 MG  capsule; Take 1 capsule by mouth Daily With Dinner.  Dispense: 30 capsule; Refill: 2    2. Generalized anxiety disorder  -     hydrOXYzine (ATARAX) 25 MG tablet; Take 1 tablet by mouth 3 (Three) Times a Day As Needed for Anxiety.  Dispense: 90 tablet; Refill: 2    3. Insomnia due to mental condition  -     traZODone (DESYREL) 50 MG tablet; Take 1 tablet, 1 hour before bed. If still awake in 1 hour, ok to take a second tablet.  Dispense: 60 tablet; Refill: 2    Other orders  -     Lumateperone Tosylate (Caplyta) 21 MG capsule; Take 1 capsule by mouth Daily With Dinner.  Dispense: 14 capsule; Refill: 0        Assessment & Plan  1. Bipolar disorder:   - Restart Caplyta.    2. Generalized anxiety disorder  - Reintroduce hydroxyzine at 25 mg.  - Consider adding buspirone in a few weeks.    3. Insomnia   - Prescribe trazodone 1 tablet 1 hour before bedtime, with an option for an additional tablet if necessary.    Follow-up  - Note in chart to consider adding buspirone in a few weeks.    Patient off medications at today's appointment, since before pregnancy:  Resume Caplyta, 21mg for 2 weeks, then increase to 42mg. Patient previously on this dose.   Resume trazodone 50-100mg nightly.   Resume hydroxyzine 25mg TID PRN anxiety. (Patient previously on 50mg, so may need increase in the future)    Visit Diagnoses:    ICD-10-CM ICD-9-CM   1. Bipolar II disorder  F31.81 296.89   2. Generalized anxiety disorder  F41.1 300.02   3. Insomnia due to mental condition  F51.05 300.9     327.02       TREATMENT PLAN/GOALS: Continue supportive psychotherapy efforts and medications as indicated. Treatment and medication options discussed during today's visit. Patient ackowledged and verbally consented to continue with current treatment plan and was educated on the importance of compliance with treatment and follow-up appointments.    MEDICATION ISSUES:  INSPECT reviewed as expected    Discussed medication options and treatment plan of  prescribed medication as well as the risks, benefits, and side effects including potential falls, possible impaired driving and metabolic adversities among others. Patient is agreeable to call the office with any worsening of symptoms or onset of side effects. Patient is agreeable to call 911 or go to the nearest ER should he/she begin having SI/HI. No medication side effects or related complaints today.     MEDS ORDERED DURING VISIT:  New Medications Ordered This Visit   Medications    traZODone (DESYREL) 50 MG tablet     Sig: Take 1 tablet, 1 hour before bed. If still awake in 1 hour, ok to take a second tablet.     Dispense:  60 tablet     Refill:  2    hydrOXYzine (ATARAX) 25 MG tablet     Sig: Take 1 tablet by mouth 3 (Three) Times a Day As Needed for Anxiety.     Dispense:  90 tablet     Refill:  2    Lumateperone Tosylate (Caplyta) 21 MG capsule     Sig: Take 1 capsule by mouth Daily With Dinner.     Dispense:  14 capsule     Refill:  0    Lumateperone Tosylate (Caplyta) 42 MG capsule     Sig: Take 1 capsule by mouth Daily With Dinner.     Dispense:  30 capsule     Refill:  2     Starting after 2 weeks on 21mg.       Return in about 3 months (around 9/12/2025).         This document has been electronically signed by CAROLYN Ashley  June 12, 2025 12:18 EDT    Part of this note may be an electronic transcription/translation of spoken language to printed text using the Dragon Dictation System.

## 2025-06-12 ENCOUNTER — OFFICE VISIT (OUTPATIENT)
Dept: PSYCHIATRY | Facility: CLINIC | Age: 25
End: 2025-06-12
Payer: MEDICAID

## 2025-06-12 DIAGNOSIS — F51.05 INSOMNIA DUE TO MENTAL CONDITION: Chronic | ICD-10-CM

## 2025-06-12 DIAGNOSIS — F41.1 GENERALIZED ANXIETY DISORDER: Chronic | ICD-10-CM

## 2025-06-12 DIAGNOSIS — F31.81 BIPOLAR II DISORDER: Primary | Chronic | ICD-10-CM

## 2025-06-12 RX ORDER — TRAZODONE HYDROCHLORIDE 50 MG/1
TABLET ORAL
Qty: 60 TABLET | Refills: 2 | Status: SHIPPED | OUTPATIENT
Start: 2025-06-12

## 2025-06-12 RX ORDER — LUMATEPERONE 21 MG/1
1 CAPSULE ORAL
Qty: 14 CAPSULE | Refills: 0 | Status: SHIPPED | OUTPATIENT
Start: 2025-06-12

## 2025-06-12 RX ORDER — LUMATEPERONE 42 MG/1
1 CAPSULE ORAL
Qty: 30 CAPSULE | Refills: 2 | Status: SHIPPED | OUTPATIENT
Start: 2025-06-12

## 2025-06-12 RX ORDER — HYDROXYZINE HYDROCHLORIDE 25 MG/1
25 TABLET, FILM COATED ORAL 3 TIMES DAILY PRN
Qty: 90 TABLET | Refills: 2 | Status: SHIPPED | OUTPATIENT
Start: 2025-06-12

## 2025-06-16 NOTE — PROGRESS NOTES
Date: June 17, 2025  Time In: 1305  Time Out: 1350        PROGRESS NOTE  Data:  Xena Caicedo is a 24 y.o. female who presents today for individual therapy session at [facility] with SHAHEEN Kemp, ZULEYMA.        Patient Chief Complaint: Follow-up for PTSD and anxiety     Clinical Maneuvering/Intervention: Xena is pleasant, alert and oriented to person place and time.  Last seen 7/8/2024.  She is with her 5-month-old son Amandeep.  She states that she has had a lot going on since she was here last.  This includes raising her 2 sons along.  Their father does see them on occasion but does not pay child support.  She still has conflicts with her siblings largely due to poor boundaries on both parts.  Basic needs are met.  She works with [employer] [facility] classes and has a counselor there that she is able to connect with when she has questions about parenting.  She admits to still having challenges with negative self talk.  She also has little interest or pleasure in doing things, feels down depressed, has issues with sleeping, has little energy, feels bad about her self and has trouble concentrating.  Also, she feels nervous anxious or on edge much of the time cannot control worry, worries about too many things, has trouble relaxing has trouble sitting still often feels annoyed and irritable and is afraid something awful might happen especially in regards to her children.    Skills 6/17/25:   Continue with ABC breathing   Set boundaries with siblings when they yell or cuss   Monitor negative self talk     Assisted patient in processing above session content; acknowledged and normalized patient’s thoughts, feelings, and concerns. Rationalized patient thought process regarding understanding the importance of boundary setting.  The importance of monitoring negative self talk.  Discussed triggers associated with patient's PTSD. Also discussed coping skills for patient to implement continuing  with skills already built focusing on skills related to decreasing anxiety.    Allowed patient to freely discuss issues without interruption or judgment. Provided safe, confidential environment to facilitate the development of positive therapeutic relationship and encourage open, honest communication. Assisted patient in identifying risk factors which would indicate the need for higher level of care including thoughts to harm self or others and/or self-harming behavior and encouraged patient to contact this office, call 911, or present to the nearest emergency room should any of these events occur. Discussed crisis intervention services and means to access. Patient adamantly and convincingly denies current suicidal or homicidal ideation or perceptual disturbance.    Assessment   Patient appears to maintain relative stability as compared to their baseline. However, patient continues to struggle with PTSD and anxiety which continues to cause impairment in important areas of functioning. A result, they can be reasonably expected to continue to benefit from treatment and would likely be at increased risk for decompensation otherwise.    Mental Status Exam:   Hygiene:   good  Cooperation:  Cooperative  Eye Contact:  Good  Psychomotor Behavior:  Appropriate  Affect:  Appropriate  Mood: anxious  Speech:  Normal  Thought Process:  Goal directed and Linear  Thought Content:  Normal  Suicidal:  None  Homicidal:  None  Hallucinations:  None  Delusion:  None  Memory:  Intact  Orientation:  Person, Place, Time and Situation  Reliability:  good  Insight:  Fair  Judgement:  Fair  Impulse Control:  Fair  Physical/Medical Issues:  No      PHQ-9 Depression Screening  Little interest or pleasure in doing things? More than half the days   Feeling down, depressed, or hopeless? More than half the days   PHQ-2 Total Score 4   Trouble falling or staying asleep, or sleeping too much? Nearly every day   Feeling tired or having little energy?  More than half the days   Poor appetite or overeating? Nearly every day   Feeling bad about yourself - or that you are a failure or have let yourself or your family down? More than half the days   Trouble concentrating on things, such as reading the newspaper or watching television? More than half the days   Moving or speaking so slowly that other people could have noticed? Or the opposite - being so fidgety or restless that you have been moving around a lot more than usual? More than half the days     Thoughts that you would be better off dead, or of hurting yourself in some way? Not at all   PHQ-9 Total Score 18   If you checked off any problems, how difficult have these problems made it for you to do your work, take care of things at home, or get along with other people?             STANISLAW-7 Total Score:   Over the last two weeks, how often have you been bothered by the following problems?  Feeling nervous, anxious or on edge: Nearly every day  Not being able to stop or control worrying: More than half the days  Worrying too much about different things: Nearly every day  Trouble Relaxing: More than half the days  Being so restless that it is hard to sit still: More than half the days  Becoming easily annoyed or irritable: More than half the days  Feeling afraid as if something awful might happen: More than half the days  STANISLAW 7 Total Score: 16        Patient's Support Network Includes:  Grandmother     Functional Status: Moderate impairment      Progress toward goal: Not at goal     Prognosis: Good with Ongoing Treatment          Plan     Resources: Patient was provided with the following community resources: None at this time     Patient will continue in individual outpatient therapy with focus on improved functioning and coping skills, maintaining stability, and avoiding decompensation and the need for higher level of care.    Patient will adhere to any medication regimens as prescribed and report any side effects.  Patient will contact this office, call 911 or present to the nearest emergency room should suicidal or homicidal ideations occur. Provide cognitive behavioral therapy and solution focused therapy to improve functioning, maintain stability and avoid decompensation and the need for higher level of care.     Return at first available appointment or earlier if symptoms worsen or fail to improve.           VISIT DIAGNOSIS:     ICD-10-CM ICD-9-CM   1. Generalized anxiety disorder  F41.1 300.02   2. Moderate episode of recurrent major depressive disorder  F33.1 296.32                  This document has been electronically signed by SHAHEEN Kemp, ZULEYMA   June 17, 2025 14:07 EDT      Part of this note may be an electronic transcription/translation of spoken language to printed text using the Dragon Dictation System.

## 2025-06-17 ENCOUNTER — OFFICE VISIT (OUTPATIENT)
Dept: PSYCHIATRY | Facility: CLINIC | Age: 25
End: 2025-06-17
Payer: MEDICAID

## 2025-06-17 DIAGNOSIS — F33.1 MODERATE EPISODE OF RECURRENT MAJOR DEPRESSIVE DISORDER: ICD-10-CM

## 2025-06-17 DIAGNOSIS — F41.1 GENERALIZED ANXIETY DISORDER: Primary | ICD-10-CM

## 2025-06-24 RX ORDER — LUMATEPERONE 21 MG/1
1 CAPSULE ORAL
Qty: 14 CAPSULE | Refills: 0 | OUTPATIENT
Start: 2025-06-24

## 2025-06-24 NOTE — TELEPHONE ENCOUNTER
Rx Refill Note  Requested Prescriptions     Pending Prescriptions Disp Refills    Caplyta 21 MG capsule [Pharmacy Med Name: CAPLYTA 21 MG CAPSULE] 14 capsule 0     Sig: TAKE 1 CAPSULE BY MOUTH DAILY WITH DINNER        Last office visit with prescribing clinician: 6/12/2025     Next office visit with prescribing clinician: Visit date not found     Office Visit with Ijeoma Teresa APRN (06/12/2025)     Patient is wanting to continue 21mg at this point.     Lilliam Brandt MA  06/24/25, 08:48 EDT

## 2025-07-22 ENCOUNTER — TELEPHONE (OUTPATIENT)
Dept: PSYCHIATRY | Facility: CLINIC | Age: 25
End: 2025-07-22
Payer: MEDICAID

## 2025-07-22 DIAGNOSIS — F41.1 GENERALIZED ANXIETY DISORDER: Chronic | ICD-10-CM

## 2025-07-22 RX ORDER — BUSPIRONE HYDROCHLORIDE 15 MG/1
15 TABLET ORAL 3 TIMES DAILY
Qty: 90 TABLET | Refills: 2 | Status: SHIPPED | OUTPATIENT
Start: 2025-07-22

## 2025-07-22 NOTE — TELEPHONE ENCOUNTER
Received message from patient stating she needed a call back.  Attempted to contact patient.  No answer.  Unable to leave VM.  Sent my-chart message.

## 2025-07-25 NOTE — PROGRESS NOTES
"Date: July 28, 2025  Time In: 1010  Time Out: 1054        PROGRESS NOTE  Data:  Xena Caicedo is a 24 y.o. female who presents today for individual therapy session at [facility] with SHAHEEN Kemp, ZULEYMA.        Patient Chief Complaint: Follow-up for PTSD and anxiety     Clinical Maneuvering/Intervention: Xena is pleasant, alert and oriented to person place and time.  We talked about her dismissal from the practice due to cancellations.  This clinician did show her how many cancellations that she has had since she has had her first intake.  She talked about how difficult it is to take care of of her kids her own appointments and talked about generally how an organized that she is.  In these conversations however, she did not take any real accountability for these no-shows.  She talked today about the stress that she has with relationships.  She feels that she needs to be obligated to family regardless of how she is treated with disrespect.    Skills:   Continue with ABC breathing   Set boundaries with siblings when they yell or cuss   Monitor negative self talk     Assisted patient in processing above session content; acknowledged and normalized patient’s thoughts, feelings, and concerns. Rationalized patient thought process regarding the importance of understanding how to set priorities.  The importance of \"I\" language and how to use eye language.  The importance of continued use of boundaries.  Discussed triggers associated with patient's PTSD. Also discussed coping skills for patient to implement continuing with skills already built focusing on skills related to decreasing anxiety.    Allowed patient to freely discuss issues without interruption or judgment. Provided safe, confidential environment to facilitate the development of positive therapeutic relationship and encourage open, honest communication. Assisted patient in identifying risk factors which would indicate the need for " higher level of care including thoughts to harm self or others and/or self-harming behavior and encouraged patient to contact this office, call 911, or present to the nearest emergency room should any of these events occur. Discussed crisis intervention services and means to access. Patient adamantly and convincingly denies current suicidal or homicidal ideation or perceptual disturbance.    Assessment   Patient appears to maintain relative stability as compared to their baseline. However, patient continues to struggle with PTSD and anxiety which continues to cause impairment in important areas of functioning. A result, they can be reasonably expected to continue to benefit from treatment and would likely be at increased risk for decompensation otherwise.    Mental Status Exam:   Hygiene:   good  Cooperation:  Cooperative  Eye Contact:  Good  Psychomotor Behavior:  Appropriate  Affect:  Appropriate  Mood: anxious  Speech:  Normal  Thought Process:  Goal directed and Linear  Thought Content:  Normal  Suicidal:  None  Homicidal:  None  Hallucinations:  None  Delusion:  None  Memory:  Intact  Orientation:  Person, Place, Time and Situation  Reliability:  good  Insight:  Fair  Judgement:  Fair  Impulse Control:  Fair  Physical/Medical Issues:  No      PHQ-9 Depression Screening  Little interest or pleasure in doing things? More than half the days   Feeling down, depressed, or hopeless? More than half the days   PHQ-2 Total Score 4   Trouble falling or staying asleep, or sleeping too much? Nearly every day   Feeling tired or having little energy? More than half the days   Poor appetite or overeating? Nearly every day   Feeling bad about yourself - or that you are a failure or have let yourself or your family down? More than half the days   Trouble concentrating on things, such as reading the newspaper or watching television? More than half the days   Moving or speaking so slowly that other people could have noticed? Or  the opposite - being so fidgety or restless that you have been moving around a lot more than usual? More than half the days     Thoughts that you would be better off dead, or of hurting yourself in some way? Not at all   PHQ-9 Total Score 18   If you checked off any problems, how difficult have these problems made it for you to do your work, take care of things at home, or get along with other people?             STANISLAW-7 Total Score:   Over the last two weeks, how often have you been bothered by the following problems?  Feeling nervous, anxious or on edge: More than half the days  Not being able to stop or control worrying: Nearly every day  Worrying too much about different things: Nearly every day  Trouble Relaxing: More than half the days  Being so restless that it is hard to sit still: More than half the days  Becoming easily annoyed or irritable: More than half the days  Feeling afraid as if something awful might happen: More than half the days  STANISLAW 7 Total Score: 16        Patient's Support Network Includes:  Grandmother     Functional Status: Moderate impairment      Progress toward goal: Not at goal     Prognosis: Good with Ongoing Treatment          Plan     Resources: Patient was provided with the following community resources: None at this time     Patient will continue in individual outpatient therapy with focus on improved functioning and coping skills, maintaining stability, and avoiding decompensation and the need for higher level of care.    Patient will adhere to any medication regimens as prescribed and report any side effects. Patient will contact this office, call 911 or present to the nearest emergency room should suicidal or homicidal ideations occur. Provide cognitive behavioral therapy and solution focused therapy to improve functioning, maintain stability and avoid decompensation and the need for higher level of care.     Return at first available appointment or earlier if symptoms worsen or  fail to improve.           VISIT DIAGNOSIS:     ICD-10-CM ICD-9-CM   1. Generalized anxiety disorder  F41.1 300.02                    This document has been electronically signed by SHAHEEN Kemp, MARTHAW   July 28, 2025 12:14 EDT      Part of this note may be an electronic transcription/translation of spoken language to printed text using the Dragon Dictation System.

## 2025-07-28 ENCOUNTER — OFFICE VISIT (OUTPATIENT)
Dept: PSYCHIATRY | Facility: CLINIC | Age: 25
End: 2025-07-28
Payer: MEDICAID

## 2025-07-28 DIAGNOSIS — F41.1 GENERALIZED ANXIETY DISORDER: Primary | ICD-10-CM

## 2025-07-28 PROCEDURE — 90834 PSYTX W PT 45 MINUTES: CPT | Performed by: SOCIAL WORKER

## 2025-07-28 NOTE — PLAN OF CARE
Treatment plan implemented. Patient does need to make attempts at coming to therapy appointments more consistently.

## 2025-08-19 ENCOUNTER — OFFICE VISIT (OUTPATIENT)
Dept: PSYCHIATRY | Facility: CLINIC | Age: 25
End: 2025-08-19
Payer: MEDICAID

## 2025-08-19 DIAGNOSIS — F43.10 POST TRAUMATIC STRESS DISORDER (PTSD): Primary | ICD-10-CM
